# Patient Record
Sex: FEMALE | Race: WHITE | NOT HISPANIC OR LATINO | Employment: OTHER | URBAN - METROPOLITAN AREA
[De-identification: names, ages, dates, MRNs, and addresses within clinical notes are randomized per-mention and may not be internally consistent; named-entity substitution may affect disease eponyms.]

---

## 2017-04-13 ENCOUNTER — APPOINTMENT (EMERGENCY)
Dept: CT IMAGING | Facility: HOSPITAL | Age: 82
End: 2017-04-13
Payer: MEDICARE

## 2017-04-13 ENCOUNTER — APPOINTMENT (INPATIENT)
Dept: RADIOLOGY | Facility: HOSPITAL | Age: 82
DRG: 460 | End: 2017-04-13
Payer: MEDICARE

## 2017-04-13 ENCOUNTER — HOSPITAL ENCOUNTER (EMERGENCY)
Facility: HOSPITAL | Age: 82
End: 2017-04-13
Attending: EMERGENCY MEDICINE | Admitting: EMERGENCY MEDICINE
Payer: MEDICARE

## 2017-04-13 ENCOUNTER — ANESTHESIA EVENT (INPATIENT)
Dept: PERIOP | Facility: HOSPITAL | Age: 82
DRG: 460 | End: 2017-04-13
Payer: MEDICARE

## 2017-04-13 ENCOUNTER — HOSPITAL ENCOUNTER (INPATIENT)
Facility: HOSPITAL | Age: 82
LOS: 4 days | Discharge: NURSING, REHAB OR LONG-TERM CARE | DRG: 460 | End: 2017-04-17
Attending: SURGERY | Admitting: SURGERY
Payer: MEDICARE

## 2017-04-13 VITALS
HEIGHT: 62 IN | HEART RATE: 75 BPM | SYSTOLIC BLOOD PRESSURE: 116 MMHG | OXYGEN SATURATION: 94 % | RESPIRATION RATE: 15 BRPM | DIASTOLIC BLOOD PRESSURE: 55 MMHG | WEIGHT: 128 LBS | BODY MASS INDEX: 23.55 KG/M2 | TEMPERATURE: 97.7 F

## 2017-04-13 DIAGNOSIS — S22.059A T5 VERTEBRAL FRACTURE (HCC): ICD-10-CM

## 2017-04-13 DIAGNOSIS — T14.8XXA FRACTURE: Primary | ICD-10-CM

## 2017-04-13 DIAGNOSIS — S42.145A CLOSED NONDISPLACED FRACTURE OF GLENOID CAVITY OF LEFT SCAPULA, INITIAL ENCOUNTER: ICD-10-CM

## 2017-04-13 DIAGNOSIS — I50.9 CHRONIC CONGESTIVE HEART FAILURE, UNSPECIFIED CONGESTIVE HEART FAILURE TYPE: ICD-10-CM
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
## 2017-04-13 DIAGNOSIS — S22.050A: Primary | ICD-10-CM

## 2017-04-13 DIAGNOSIS — S22.080A T12 COMPRESSION FRACTURE (HCC): ICD-10-CM

## 2017-04-13 DIAGNOSIS — S42.109A SCAPULAR FRACTURE: ICD-10-CM

## 2017-04-13 DIAGNOSIS — S22.058A OTHER CLOSED FRACTURE OF FIFTH THORACIC VERTEBRA, INITIAL ENCOUNTER (HCC): ICD-10-CM

## 2017-04-13 PROBLEM — M54.9 PAIN IN BACK: Status: ACTIVE | Noted: 2017-04-13

## 2017-04-13 PROBLEM — M89.8X1 PAIN IN SCAPULA: Status: ACTIVE | Noted: 2017-04-13

## 2017-04-13 PROBLEM — R32 INCONTINENCE OF URINE IN FEMALE: Status: ACTIVE | Noted: 2017-04-13

## 2017-04-13 PROBLEM — R53.81 PHYSICAL DECONDITIONING: Status: ACTIVE | Noted: 2017-04-13

## 2017-04-13 PROBLEM — R52 PAIN: Status: ACTIVE | Noted: 2017-04-13

## 2017-04-13 LAB
ABO GROUP BLD: NORMAL
ALBUMIN SERPL BCP-MCNC: 3.9 G/DL (ref 3.5–5)
ALP SERPL-CCNC: 58 U/L (ref 46–116)
ALT SERPL W P-5'-P-CCNC: 27 U/L (ref 12–78)
ANION GAP SERPL CALCULATED.3IONS-SCNC: 11 MMOL/L (ref 4–13)
AST SERPL W P-5'-P-CCNC: 24 U/L (ref 5–45)
ATRIAL RATE: 71 BPM
BASOPHILS # BLD AUTO: 0.03 THOUSANDS/ΜL (ref 0–0.1)
BASOPHILS NFR BLD AUTO: 0 % (ref 0–1)
BILIRUB SERPL-MCNC: 0.5 MG/DL (ref 0.2–1)
BLD GP AB SCN SERPL QL: POSITIVE
BLOOD GROUP ANTIBODIES SERPL: NORMAL
BUN SERPL-MCNC: 18 MG/DL (ref 5–25)
CALCIUM SERPL-MCNC: 8.8 MG/DL (ref 8.3–10.1)
CHLORIDE SERPL-SCNC: 100 MMOL/L (ref 100–108)
CO2 SERPL-SCNC: 24 MMOL/L (ref 21–32)
CREAT SERPL-MCNC: 0.72 MG/DL (ref 0.6–1.3)
EOSINOPHIL # BLD AUTO: 0.04 THOUSAND/ΜL (ref 0–0.61)
EOSINOPHIL NFR BLD AUTO: 0 % (ref 0–6)
ERYTHROCYTE [DISTWIDTH] IN BLOOD BY AUTOMATED COUNT: 15.1 % (ref 11.6–15.1)
GFR SERPL CREATININE-BSD FRML MDRD: >60 ML/MIN/1.73SQ M
GLUCOSE SERPL-MCNC: 137 MG/DL (ref 65–140)
HCT VFR BLD AUTO: 39.2 % (ref 34.8–46.1)
HGB BLD-MCNC: 12.6 G/DL (ref 11.5–15.4)
INR PPP: 1.22 (ref 0.86–1.16)
LYMPHOCYTES # BLD AUTO: 1 THOUSANDS/ΜL (ref 0.6–4.47)
LYMPHOCYTES NFR BLD AUTO: 8 % (ref 14–44)
MCH RBC QN AUTO: 30.1 PG (ref 26.8–34.3)
MCHC RBC AUTO-ENTMCNC: 32.1 G/DL (ref 31.4–37.4)
MCV RBC AUTO: 94 FL (ref 82–98)
MONOCYTES # BLD AUTO: 1.08 THOUSAND/ΜL (ref 0.17–1.22)
MONOCYTES NFR BLD AUTO: 8 % (ref 4–12)
NEUTROPHILS # BLD AUTO: 11.18 THOUSANDS/ΜL (ref 1.85–7.62)
NEUTS SEG NFR BLD AUTO: 83 % (ref 43–75)
NRBC BLD AUTO-RTO: 0 /100 WBCS
P AXIS: 66 DEGREES
PLATELET # BLD AUTO: 157 THOUSANDS/UL (ref 149–390)
PLATELET # BLD AUTO: 174 THOUSANDS/UL (ref 149–390)
PMV BLD AUTO: 9.1 FL (ref 8.9–12.7)
PMV BLD AUTO: 9.4 FL (ref 8.9–12.7)
POTASSIUM SERPL-SCNC: 3.7 MMOL/L (ref 3.5–5.3)
PR INTERVAL: 170 MS
PROT SERPL-MCNC: 7.1 G/DL (ref 6.4–8.2)
PROTHROMBIN TIME: 15.2 SECONDS (ref 12–14.3)
QRS AXIS: 28 DEGREES
QRSD INTERVAL: 96 MS
QT INTERVAL: 404 MS
QTC INTERVAL: 439 MS
RBC # BLD AUTO: 4.18 MILLION/UL (ref 3.81–5.12)
RH BLD: NEGATIVE
SODIUM SERPL-SCNC: 135 MMOL/L (ref 136–145)
T WAVE AXIS: -68 DEGREES
TROPONIN I SERPL-MCNC: <0.02 NG/ML
VENTRICULAR RATE: 71 BPM
WBC # BLD AUTO: 13.42 THOUSAND/UL (ref 4.31–10.16)

## 2017-04-13 PROCEDURE — 86921 COMPATIBILITY TEST INCUBATE: CPT

## 2017-04-13 PROCEDURE — 70450 CT HEAD/BRAIN W/O DYE: CPT

## 2017-04-13 PROCEDURE — 86870 RBC ANTIBODY IDENTIFICATION: CPT | Performed by: SURGERY

## 2017-04-13 PROCEDURE — 85049 AUTOMATED PLATELET COUNT: CPT | Performed by: EMERGENCY MEDICINE

## 2017-04-13 PROCEDURE — 86850 RBC ANTIBODY SCREEN: CPT | Performed by: ORTHOPAEDIC SURGERY

## 2017-04-13 PROCEDURE — 96376 TX/PRO/DX INJ SAME DRUG ADON: CPT

## 2017-04-13 PROCEDURE — 90471 IMMUNIZATION ADMIN: CPT

## 2017-04-13 PROCEDURE — 94760 N-INVAS EAR/PLS OXIMETRY 1: CPT

## 2017-04-13 PROCEDURE — 71260 CT THORAX DX C+: CPT

## 2017-04-13 PROCEDURE — 84484 ASSAY OF TROPONIN QUANT: CPT | Performed by: EMERGENCY MEDICINE

## 2017-04-13 PROCEDURE — 99285 EMERGENCY DEPT VISIT HI MDM: CPT

## 2017-04-13 PROCEDURE — 36415 COLL VENOUS BLD VENIPUNCTURE: CPT | Performed by: EMERGENCY MEDICINE

## 2017-04-13 PROCEDURE — 96374 THER/PROPH/DIAG INJ IV PUSH: CPT

## 2017-04-13 PROCEDURE — 74177 CT ABD & PELVIS W/CONTRAST: CPT

## 2017-04-13 PROCEDURE — 90715 TDAP VACCINE 7 YRS/> IM: CPT | Performed by: EMERGENCY MEDICINE

## 2017-04-13 PROCEDURE — 86922 COMPATIBILITY TEST ANTIGLOB: CPT

## 2017-04-13 PROCEDURE — 96375 TX/PRO/DX INJ NEW DRUG ADDON: CPT

## 2017-04-13 PROCEDURE — 80053 COMPREHEN METABOLIC PANEL: CPT | Performed by: EMERGENCY MEDICINE

## 2017-04-13 PROCEDURE — 72146 MRI CHEST SPINE W/O DYE: CPT

## 2017-04-13 PROCEDURE — 85610 PROTHROMBIN TIME: CPT | Performed by: EMERGENCY MEDICINE

## 2017-04-13 PROCEDURE — 93005 ELECTROCARDIOGRAM TRACING: CPT | Performed by: EMERGENCY MEDICINE

## 2017-04-13 PROCEDURE — 72148 MRI LUMBAR SPINE W/O DYE: CPT

## 2017-04-13 PROCEDURE — 94640 AIRWAY INHALATION TREATMENT: CPT

## 2017-04-13 PROCEDURE — 86901 BLOOD TYPING SEROLOGIC RH(D): CPT | Performed by: ORTHOPAEDIC SURGERY

## 2017-04-13 PROCEDURE — 85025 COMPLETE CBC W/AUTO DIFF WBC: CPT | Performed by: EMERGENCY MEDICINE

## 2017-04-13 PROCEDURE — 86900 BLOOD TYPING SEROLOGIC ABO: CPT | Performed by: ORTHOPAEDIC SURGERY

## 2017-04-13 RX ORDER — CARVEDILOL 3.12 MG/1
3.12 TABLET ORAL 2 TIMES DAILY WITH MEALS
Status: DISCONTINUED | OUTPATIENT
Start: 2017-04-13 | End: 2017-04-15

## 2017-04-13 RX ORDER — OXYCODONE HYDROCHLORIDE 5 MG/1
5 TABLET ORAL EVERY 4 HOURS PRN
Status: DISCONTINUED | OUTPATIENT
Start: 2017-04-13 | End: 2017-04-17 | Stop reason: HOSPADM

## 2017-04-13 RX ORDER — MONTELUKAST SODIUM 10 MG/1
10 TABLET ORAL
COMMUNITY
End: 2018-11-23 | Stop reason: SDUPTHER

## 2017-04-13 RX ORDER — SODIUM CHLORIDE FOR INHALATION 0.9 %
3 VIAL, NEBULIZER (ML) INHALATION
Status: DISCONTINUED | OUTPATIENT
Start: 2017-04-13 | End: 2017-04-17 | Stop reason: HOSPADM

## 2017-04-13 RX ORDER — METHOCARBAMOL 500 MG/1
500 TABLET, FILM COATED ORAL EVERY 6 HOURS PRN
Status: DISCONTINUED | OUTPATIENT
Start: 2017-04-13 | End: 2017-04-17 | Stop reason: HOSPADM

## 2017-04-13 RX ORDER — DIAZEPAM 5 MG/ML
2.5 INJECTION, SOLUTION INTRAMUSCULAR; INTRAVENOUS ONCE
Status: COMPLETED | OUTPATIENT
Start: 2017-04-13 | End: 2017-04-13

## 2017-04-13 RX ORDER — AMOXICILLIN 250 MG
1 CAPSULE ORAL
Status: DISCONTINUED | OUTPATIENT
Start: 2017-04-13 | End: 2017-04-17 | Stop reason: HOSPADM

## 2017-04-13 RX ORDER — ONDANSETRON 2 MG/ML
INJECTION INTRAMUSCULAR; INTRAVENOUS
Status: COMPLETED
Start: 2017-04-13 | End: 2017-04-13

## 2017-04-13 RX ORDER — OXYCODONE HYDROCHLORIDE 5 MG/1
5 TABLET ORAL EVERY 4 HOURS PRN
Status: DISCONTINUED | OUTPATIENT
Start: 2017-04-13 | End: 2017-04-13

## 2017-04-13 RX ORDER — ACETAMINOPHEN 325 MG/1
650 TABLET ORAL EVERY 6 HOURS SCHEDULED
Status: DISCONTINUED | OUTPATIENT
Start: 2017-04-13 | End: 2017-04-17 | Stop reason: HOSPADM

## 2017-04-13 RX ORDER — FUROSEMIDE 20 MG/1
20 TABLET ORAL 2 TIMES DAILY
COMMUNITY
End: 2017-10-26

## 2017-04-13 RX ORDER — ACETAMINOPHEN 325 MG/1
650 TABLET ORAL EVERY 6 HOURS PRN
Status: DISCONTINUED | OUTPATIENT
Start: 2017-04-13 | End: 2017-04-13

## 2017-04-13 RX ORDER — ALBUTEROL SULFATE 2.5 MG/3ML
2.5 SOLUTION RESPIRATORY (INHALATION) EVERY 6 HOURS PRN
COMMUNITY

## 2017-04-13 RX ORDER — LEVALBUTEROL 1.25 MG/.5ML
1.25 SOLUTION, CONCENTRATE RESPIRATORY (INHALATION)
Status: DISCONTINUED | OUTPATIENT
Start: 2017-04-13 | End: 2017-04-17 | Stop reason: HOSPADM

## 2017-04-13 RX ORDER — MORPHINE SULFATE 4 MG/ML
4 INJECTION, SOLUTION INTRAMUSCULAR; INTRAVENOUS EVERY 4 HOURS PRN
Status: DISCONTINUED | OUTPATIENT
Start: 2017-04-13 | End: 2017-04-13 | Stop reason: HOSPADM

## 2017-04-13 RX ORDER — LISINOPRIL 2.5 MG/1
2.5 TABLET ORAL
COMMUNITY
End: 2018-06-14 | Stop reason: SDUPTHER

## 2017-04-13 RX ORDER — ATORVASTATIN CALCIUM 80 MG/1
80 TABLET, FILM COATED ORAL
Status: DISCONTINUED | OUTPATIENT
Start: 2017-04-13 | End: 2017-04-17 | Stop reason: HOSPADM

## 2017-04-13 RX ORDER — LISINOPRIL 2.5 MG/1
2.5 TABLET ORAL DAILY
Status: DISCONTINUED | OUTPATIENT
Start: 2017-04-14 | End: 2017-04-14

## 2017-04-13 RX ORDER — ATORVASTATIN CALCIUM 80 MG/1
80 TABLET, FILM COATED ORAL DAILY
COMMUNITY
End: 2018-11-23 | Stop reason: SDUPTHER

## 2017-04-13 RX ORDER — ASPIRIN 81 MG/1
81 TABLET ORAL DAILY
COMMUNITY
End: 2017-10-26

## 2017-04-13 RX ORDER — DIAZEPAM 5 MG/ML
INJECTION, SOLUTION INTRAMUSCULAR; INTRAVENOUS
Status: DISCONTINUED
Start: 2017-04-13 | End: 2017-04-13 | Stop reason: HOSPADM

## 2017-04-13 RX ORDER — OXYCODONE HYDROCHLORIDE 5 MG/1
2.5 TABLET ORAL EVERY 4 HOURS PRN
Status: DISCONTINUED | OUTPATIENT
Start: 2017-04-13 | End: 2017-04-17 | Stop reason: HOSPADM

## 2017-04-13 RX ORDER — ONDANSETRON 2 MG/ML
4 INJECTION INTRAMUSCULAR; INTRAVENOUS ONCE
Status: COMPLETED | OUTPATIENT
Start: 2017-04-13 | End: 2017-04-13

## 2017-04-13 RX ORDER — HEPARIN SODIUM 5000 [USP'U]/ML
5000 INJECTION, SOLUTION INTRAVENOUS; SUBCUTANEOUS EVERY 8 HOURS SCHEDULED
Status: DISCONTINUED | OUTPATIENT
Start: 2017-04-13 | End: 2017-04-14

## 2017-04-13 RX ORDER — LIDOCAINE HYDROCHLORIDE 10 MG/ML
1 INJECTION, SOLUTION EPIDURAL; INFILTRATION; INTRACAUDAL; PERINEURAL ONCE
Status: DISCONTINUED | OUTPATIENT
Start: 2017-04-13 | End: 2017-04-13

## 2017-04-13 RX ORDER — MORPHINE SULFATE 4 MG/ML
4 INJECTION, SOLUTION INTRAMUSCULAR; INTRAVENOUS ONCE
Status: DISCONTINUED | OUTPATIENT
Start: 2017-04-13 | End: 2017-04-13 | Stop reason: HOSPADM

## 2017-04-13 RX ORDER — OXYCODONE HYDROCHLORIDE 10 MG/1
10 TABLET ORAL EVERY 4 HOURS PRN
Status: DISCONTINUED | OUTPATIENT
Start: 2017-04-13 | End: 2017-04-13

## 2017-04-13 RX ORDER — FUROSEMIDE 20 MG/1
20 TABLET ORAL
Status: DISCONTINUED | OUTPATIENT
Start: 2017-04-13 | End: 2017-04-17 | Stop reason: HOSPADM

## 2017-04-13 RX ORDER — CARVEDILOL 3.12 MG/1
3.12 TABLET ORAL 2 TIMES DAILY WITH MEALS
COMMUNITY
End: 2018-09-10 | Stop reason: SDUPTHER

## 2017-04-13 RX ORDER — B-COMPLEX WITH VITAMIN C
1 TABLET ORAL 2 TIMES DAILY WITH MEALS
Status: DISCONTINUED | OUTPATIENT
Start: 2017-04-13 | End: 2017-04-17 | Stop reason: HOSPADM

## 2017-04-13 RX ORDER — MONTELUKAST SODIUM 10 MG/1
10 TABLET ORAL
Status: DISCONTINUED | OUTPATIENT
Start: 2017-04-13 | End: 2017-04-17 | Stop reason: HOSPADM

## 2017-04-13 RX ORDER — ALBUTEROL SULFATE 2.5 MG/3ML
2.5 SOLUTION RESPIRATORY (INHALATION) EVERY 6 HOURS PRN
Status: DISCONTINUED | OUTPATIENT
Start: 2017-04-13 | End: 2017-04-17 | Stop reason: HOSPADM

## 2017-04-13 RX ADMIN — ISODIUM CHLORIDE 3 ML: 0.03 SOLUTION RESPIRATORY (INHALATION) at 19:29

## 2017-04-13 RX ADMIN — ACETAMINOPHEN 650 MG: 325 TABLET, FILM COATED ORAL at 18:02

## 2017-04-13 RX ADMIN — Medication 1 TABLET: at 21:40

## 2017-04-13 RX ADMIN — HYDROMORPHONE HYDROCHLORIDE 0.5 MG: 1 INJECTION, SOLUTION INTRAMUSCULAR; INTRAVENOUS; SUBCUTANEOUS at 21:40

## 2017-04-13 RX ADMIN — HEPARIN SODIUM 5000 UNITS: 5000 INJECTION, SOLUTION INTRAVENOUS; SUBCUTANEOUS at 13:27

## 2017-04-13 RX ADMIN — METHOCARBAMOL 500 MG: 500 TABLET ORAL at 13:26

## 2017-04-13 RX ADMIN — FUROSEMIDE 20 MG: 20 TABLET ORAL at 18:02

## 2017-04-13 RX ADMIN — CARVEDILOL 3.12 MG: 3.12 TABLET, FILM COATED ORAL at 18:03

## 2017-04-13 RX ADMIN — METFORMIN HYDROCHLORIDE 500 MG: 500 TABLET, FILM COATED ORAL at 18:02

## 2017-04-13 RX ADMIN — TETANUS TOXOID, REDUCED DIPHTHERIA TOXOID AND ACELLULAR PERTUSSIS VACCINE, ADSORBED 0.5 ML: 5; 2.5; 8; 8; 2.5 SUSPENSION INTRAMUSCULAR at 13:01

## 2017-04-13 RX ADMIN — MORPHINE SULFATE 4 MG: 4 INJECTION, SOLUTION INTRAMUSCULAR; INTRAVENOUS at 09:33

## 2017-04-13 RX ADMIN — LEVALBUTEROL HYDROCHLORIDE 1.25 MG: 1.25 SOLUTION, CONCENTRATE RESPIRATORY (INHALATION) at 19:29

## 2017-04-13 RX ADMIN — ACETAMINOPHEN 650 MG: 325 TABLET, FILM COATED ORAL at 23:55

## 2017-04-13 RX ADMIN — ATORVASTATIN CALCIUM 80 MG: 80 TABLET, FILM COATED ORAL at 18:02

## 2017-04-13 RX ADMIN — ONDANSETRON 4 MG: 2 INJECTION INTRAMUSCULAR; INTRAVENOUS at 09:34

## 2017-04-13 RX ADMIN — DIAZEPAM 2.5 MG: 5 INJECTION, SOLUTION INTRAMUSCULAR; INTRAVENOUS at 10:18

## 2017-04-13 RX ADMIN — HEPARIN SODIUM 5000 UNITS: 5000 INJECTION, SOLUTION INTRAVENOUS; SUBCUTANEOUS at 21:40

## 2017-04-13 RX ADMIN — MORPHINE SULFATE 4 MG: 4 INJECTION, SOLUTION INTRAMUSCULAR; INTRAVENOUS at 06:41

## 2017-04-13 RX ADMIN — MONTELUKAST SODIUM 10 MG: 10 TABLET, FILM COATED ORAL at 21:40

## 2017-04-13 RX ADMIN — HYDROMORPHONE HYDROCHLORIDE 0.5 MG: 1 INJECTION, SOLUTION INTRAMUSCULAR; INTRAVENOUS; SUBCUTANEOUS at 18:02

## 2017-04-13 RX ADMIN — IOHEXOL 100 ML: 350 INJECTION, SOLUTION INTRAVENOUS at 08:16

## 2017-04-13 RX ADMIN — CALCIUM CARBONATE 500 MG (1,250 MG)-VITAMIN D3 200 UNIT TABLET 1 TABLET: at 18:02

## 2017-04-13 RX ADMIN — ACETAMINOPHEN 650 MG: 325 TABLET, FILM COATED ORAL at 13:26

## 2017-04-13 RX ADMIN — HYDROMORPHONE HYDROCHLORIDE 0.5 MG: 1 INJECTION, SOLUTION INTRAMUSCULAR; INTRAVENOUS; SUBCUTANEOUS at 13:18

## 2017-04-14 ENCOUNTER — ANESTHESIA (INPATIENT)
Dept: PERIOP | Facility: HOSPITAL | Age: 82
DRG: 460 | End: 2017-04-14
Payer: MEDICARE

## 2017-04-14 ENCOUNTER — APPOINTMENT (INPATIENT)
Dept: RADIOLOGY | Facility: HOSPITAL | Age: 82
DRG: 460 | End: 2017-04-14
Payer: MEDICARE

## 2017-04-14 LAB
ANION GAP SERPL CALCULATED.3IONS-SCNC: 5 MMOL/L (ref 4–13)
BUN SERPL-MCNC: 15 MG/DL (ref 5–25)
CALCIUM SERPL-MCNC: 8.8 MG/DL (ref 8.3–10.1)
CHLORIDE SERPL-SCNC: 98 MMOL/L (ref 100–108)
CO2 SERPL-SCNC: 30 MMOL/L (ref 21–32)
CREAT SERPL-MCNC: 0.53 MG/DL (ref 0.6–1.3)
GFR SERPL CREATININE-BSD FRML MDRD: >60 ML/MIN/1.73SQ M
GLUCOSE SERPL-MCNC: 116 MG/DL (ref 65–140)
GLUCOSE SERPL-MCNC: 123 MG/DL (ref 65–140)
GLUCOSE SERPL-MCNC: 141 MG/DL (ref 65–140)
GLUCOSE SERPL-MCNC: 148 MG/DL (ref 65–140)
GLUCOSE SERPL-MCNC: 92 MG/DL (ref 65–140)
POTASSIUM SERPL-SCNC: 4.2 MMOL/L (ref 3.5–5.3)
SODIUM SERPL-SCNC: 133 MMOL/L (ref 136–145)

## 2017-04-14 PROCEDURE — 85018 HEMOGLOBIN: CPT | Performed by: EMERGENCY MEDICINE

## 2017-04-14 PROCEDURE — 72072 X-RAY EXAM THORAC SPINE 3VWS: CPT

## 2017-04-14 PROCEDURE — C1713 ANCHOR/SCREW BN/BN,TIS/BN: HCPCS | Performed by: ORTHOPAEDIC SURGERY

## 2017-04-14 PROCEDURE — 0RG70Z1: ICD-10-PCS | Performed by: ORTHOPAEDIC SURGERY

## 2017-04-14 PROCEDURE — 95940 IONM IN OPERATNG ROOM 15 MIN: CPT | Performed by: ORTHOPAEDIC SURGERY

## 2017-04-14 PROCEDURE — 80048 BASIC METABOLIC PNL TOTAL CA: CPT | Performed by: EMERGENCY MEDICINE

## 2017-04-14 PROCEDURE — 94760 N-INVAS EAR/PLS OXIMETRY 1: CPT

## 2017-04-14 PROCEDURE — 82948 REAGENT STRIP/BLOOD GLUCOSE: CPT

## 2017-04-14 PROCEDURE — 94640 AIRWAY INHALATION TREATMENT: CPT

## 2017-04-14 DEVICE — VITALLIUM STRAIGHT ROD
Type: IMPLANTABLE DEVICE | Site: SPINE THORACIC | Status: FUNCTIONAL
Brand: XIA 4 5

## 2017-04-14 DEVICE — POLYAXIAL SCREW
Type: IMPLANTABLE DEVICE | Site: SPINE THORACIC | Status: FUNCTIONAL
Brand: XIA 4 5

## 2017-04-14 DEVICE — BLOCKER
Type: IMPLANTABLE DEVICE | Site: SPINE THORACIC | Status: FUNCTIONAL
Brand: XIA 4 5

## 2017-04-14 DEVICE — BONE GRAFT SUBSTITUTE, FOAM PACK, BETA-TRICALCIUM PHOSPHATE AND TYPE I BOVINE COLLAGEN
Type: IMPLANTABLE DEVICE | Site: SPINE THORACIC | Status: FUNCTIONAL
Brand: VITOSS

## 2017-04-14 RX ORDER — FENTANYL CITRATE 50 UG/ML
INJECTION, SOLUTION INTRAMUSCULAR; INTRAVENOUS AS NEEDED
Status: DISCONTINUED | OUTPATIENT
Start: 2017-04-14 | End: 2017-04-14 | Stop reason: SURG

## 2017-04-14 RX ORDER — MAGNESIUM HYDROXIDE 1200 MG/15ML
LIQUID ORAL AS NEEDED
Status: DISCONTINUED | OUTPATIENT
Start: 2017-04-14 | End: 2017-04-14 | Stop reason: HOSPADM

## 2017-04-14 RX ORDER — ALBUMIN, HUMAN INJ 5% 5 %
SOLUTION INTRAVENOUS CONTINUOUS PRN
Status: DISCONTINUED | OUTPATIENT
Start: 2017-04-14 | End: 2017-04-14 | Stop reason: SURG

## 2017-04-14 RX ORDER — ONDANSETRON 2 MG/ML
4 INJECTION INTRAMUSCULAR; INTRAVENOUS EVERY 8 HOURS PRN
Status: DISCONTINUED | OUTPATIENT
Start: 2017-04-14 | End: 2017-04-17 | Stop reason: HOSPADM

## 2017-04-14 RX ORDER — REMIFENTANIL HYDROCHLORIDE 1 MG/ML
0.5 INJECTION, POWDER, LYOPHILIZED, FOR SOLUTION INTRAVENOUS ONCE
Status: DISCONTINUED | OUTPATIENT
Start: 2017-04-14 | End: 2017-04-14

## 2017-04-14 RX ORDER — LISINOPRIL 2.5 MG/1
2.5 TABLET ORAL DAILY
Status: DISCONTINUED | OUTPATIENT
Start: 2017-04-15 | End: 2017-04-15

## 2017-04-14 RX ORDER — FENTANYL CITRATE/PF 50 MCG/ML
25 SYRINGE (ML) INJECTION
Status: DISCONTINUED | OUTPATIENT
Start: 2017-04-14 | End: 2017-04-14 | Stop reason: HOSPADM

## 2017-04-14 RX ORDER — ROCURONIUM BROMIDE 10 MG/ML
INJECTION, SOLUTION INTRAVENOUS AS NEEDED
Status: DISCONTINUED | OUTPATIENT
Start: 2017-04-14 | End: 2017-04-14 | Stop reason: SURG

## 2017-04-14 RX ORDER — OXYCODONE HYDROCHLORIDE 5 MG/1
TABLET ORAL
Qty: 30 TABLET | Refills: 0 | Status: SHIPPED | OUTPATIENT
Start: 2017-04-14 | End: 2017-06-20 | Stop reason: HOSPADM

## 2017-04-14 RX ORDER — SODIUM CHLORIDE, SODIUM LACTATE, POTASSIUM CHLORIDE, CALCIUM CHLORIDE 600; 310; 30; 20 MG/100ML; MG/100ML; MG/100ML; MG/100ML
INJECTION, SOLUTION INTRAVENOUS CONTINUOUS PRN
Status: DISCONTINUED | OUTPATIENT
Start: 2017-04-14 | End: 2017-04-14 | Stop reason: SURG

## 2017-04-14 RX ORDER — SODIUM CHLORIDE, SODIUM LACTATE, POTASSIUM CHLORIDE, CALCIUM CHLORIDE 600; 310; 30; 20 MG/100ML; MG/100ML; MG/100ML; MG/100ML
75 INJECTION, SOLUTION INTRAVENOUS CONTINUOUS
Status: DISCONTINUED | OUTPATIENT
Start: 2017-04-14 | End: 2017-04-14

## 2017-04-14 RX ORDER — PROPOFOL 10 MG/ML
INJECTION, EMULSION INTRAVENOUS CONTINUOUS PRN
Status: DISCONTINUED | OUTPATIENT
Start: 2017-04-14 | End: 2017-04-14 | Stop reason: SURG

## 2017-04-14 RX ORDER — SUCCINYLCHOLINE/SOD CL,ISO/PF 100 MG/5ML
SYRINGE (ML) INTRAVENOUS AS NEEDED
Status: DISCONTINUED | OUTPATIENT
Start: 2017-04-14 | End: 2017-04-14 | Stop reason: SURG

## 2017-04-14 RX ORDER — PROPOFOL 10 MG/ML
INJECTION, EMULSION INTRAVENOUS AS NEEDED
Status: DISCONTINUED | OUTPATIENT
Start: 2017-04-14 | End: 2017-04-14 | Stop reason: SURG

## 2017-04-14 RX ORDER — ACETAMINOPHEN 325 MG/1
650 TABLET ORAL EVERY 6 HOURS PRN
Qty: 60 TABLET | Refills: 0 | Status: SHIPPED | OUTPATIENT
Start: 2017-04-14 | End: 2017-05-14

## 2017-04-14 RX ORDER — METOCLOPRAMIDE HYDROCHLORIDE 5 MG/ML
10 INJECTION INTRAMUSCULAR; INTRAVENOUS ONCE AS NEEDED
Status: DISCONTINUED | OUTPATIENT
Start: 2017-04-14 | End: 2017-04-14 | Stop reason: HOSPADM

## 2017-04-14 RX ORDER — CHLORHEXIDINE GLUCONATE 0.12 MG/ML
15 RINSE ORAL EVERY 12 HOURS SCHEDULED
Status: DISCONTINUED | OUTPATIENT
Start: 2017-04-14 | End: 2017-04-14 | Stop reason: HOSPADM

## 2017-04-14 RX ORDER — METHOCARBAMOL 750 MG/1
750 TABLET, FILM COATED ORAL 3 TIMES DAILY PRN
Qty: 60 TABLET | Refills: 0 | Status: SHIPPED | OUTPATIENT
Start: 2017-04-14 | End: 2017-06-15

## 2017-04-14 RX ORDER — ONDANSETRON 2 MG/ML
INJECTION INTRAMUSCULAR; INTRAVENOUS AS NEEDED
Status: DISCONTINUED | OUTPATIENT
Start: 2017-04-14 | End: 2017-04-14 | Stop reason: SURG

## 2017-04-14 RX ORDER — SODIUM CHLORIDE, SODIUM LACTATE, POTASSIUM CHLORIDE, CALCIUM CHLORIDE 600; 310; 30; 20 MG/100ML; MG/100ML; MG/100ML; MG/100ML
75 INJECTION, SOLUTION INTRAVENOUS CONTINUOUS
Status: DISCONTINUED | OUTPATIENT
Start: 2017-04-14 | End: 2017-04-16

## 2017-04-14 RX ORDER — SODIUM CHLORIDE 9 MG/ML
INJECTION, SOLUTION INTRAVENOUS CONTINUOUS PRN
Status: DISCONTINUED | OUTPATIENT
Start: 2017-04-14 | End: 2017-04-14 | Stop reason: SURG

## 2017-04-14 RX ORDER — CEPHALEXIN 250 MG/1
250 CAPSULE ORAL 4 TIMES DAILY
Qty: 20 CAPSULE | Refills: 0 | Status: SHIPPED | OUTPATIENT
Start: 2017-04-14 | End: 2017-04-19

## 2017-04-14 RX ORDER — BUPIVACAINE HYDROCHLORIDE 2.5 MG/ML
INJECTION, SOLUTION INFILTRATION; PERINEURAL AS NEEDED
Status: DISCONTINUED | OUTPATIENT
Start: 2017-04-14 | End: 2017-04-14 | Stop reason: HOSPADM

## 2017-04-14 RX ORDER — EPHEDRINE SULFATE 50 MG/ML
INJECTION, SOLUTION INTRAVENOUS AS NEEDED
Status: DISCONTINUED | OUTPATIENT
Start: 2017-04-14 | End: 2017-04-14 | Stop reason: SURG

## 2017-04-14 RX ADMIN — EPHEDRINE SULFATE 10 MG: 50 INJECTION, SOLUTION INTRAMUSCULAR; INTRAVENOUS; SUBCUTANEOUS at 17:45

## 2017-04-14 RX ADMIN — PROPOFOL 50 MCG/KG/MIN: 10 INJECTION, EMULSION INTRAVENOUS at 14:36

## 2017-04-14 RX ADMIN — HYDROMORPHONE HYDROCHLORIDE 0.5 MG: 1 INJECTION, SOLUTION INTRAMUSCULAR; INTRAVENOUS; SUBCUTANEOUS at 09:50

## 2017-04-14 RX ADMIN — CEFAZOLIN SODIUM 2000 MG: 10 INJECTION, POWDER, FOR SOLUTION INTRAVENOUS at 22:34

## 2017-04-14 RX ADMIN — OXYCODONE HYDROCHLORIDE 5 MG: 5 TABLET ORAL at 21:32

## 2017-04-14 RX ADMIN — ONDANSETRON 4 MG: 2 INJECTION INTRAMUSCULAR; INTRAVENOUS at 17:25

## 2017-04-14 RX ADMIN — Medication 80 MG: at 14:32

## 2017-04-14 RX ADMIN — EPHEDRINE SULFATE 10 MG: 50 INJECTION, SOLUTION INTRAMUSCULAR; INTRAVENOUS; SUBCUTANEOUS at 14:41

## 2017-04-14 RX ADMIN — OXYCODONE HYDROCHLORIDE 5 MG: 5 TABLET ORAL at 12:23

## 2017-04-14 RX ADMIN — FENTANYL CITRATE 25 MCG: 50 INJECTION INTRAMUSCULAR; INTRAVENOUS at 19:30

## 2017-04-14 RX ADMIN — HYDROMORPHONE HYDROCHLORIDE 0.5 MG: 1 INJECTION, SOLUTION INTRAMUSCULAR; INTRAVENOUS; SUBCUTANEOUS at 17:25

## 2017-04-14 RX ADMIN — ROCURONIUM BROMIDE 5 MG: 10 INJECTION, SOLUTION INTRAVENOUS at 14:32

## 2017-04-14 RX ADMIN — METHOCARBAMOL 500 MG: 500 TABLET ORAL at 05:23

## 2017-04-14 RX ADMIN — FENTANYL CITRATE 50 MCG: 50 INJECTION, SOLUTION INTRAMUSCULAR; INTRAVENOUS at 18:15

## 2017-04-14 RX ADMIN — SODIUM CHLORIDE: 0.9 INJECTION, SOLUTION INTRAVENOUS at 14:36

## 2017-04-14 RX ADMIN — Medication 1 TABLET: at 21:32

## 2017-04-14 RX ADMIN — CARVEDILOL 3.12 MG: 3.12 TABLET, FILM COATED ORAL at 08:13

## 2017-04-14 RX ADMIN — METFORMIN HYDROCHLORIDE 500 MG: 500 TABLET, FILM COATED ORAL at 20:49

## 2017-04-14 RX ADMIN — METHOCARBAMOL 500 MG: 500 TABLET ORAL at 21:32

## 2017-04-14 RX ADMIN — ALBUMIN HUMAN: 0.05 INJECTION, SOLUTION INTRAVENOUS at 16:22

## 2017-04-14 RX ADMIN — ISODIUM CHLORIDE 3 ML: 0.03 SOLUTION RESPIRATORY (INHALATION) at 07:39

## 2017-04-14 RX ADMIN — ROCURONIUM BROMIDE 15 MG: 10 INJECTION, SOLUTION INTRAVENOUS at 14:36

## 2017-04-14 RX ADMIN — REMIFENTANIL HYDROCHLORIDE 0.1 MCG/KG/MIN: 1 INJECTION, POWDER, LYOPHILIZED, FOR SOLUTION INTRAVENOUS at 14:36

## 2017-04-14 RX ADMIN — FUROSEMIDE 20 MG: 20 TABLET ORAL at 08:13

## 2017-04-14 RX ADMIN — ISODIUM CHLORIDE 3 ML: 0.03 SOLUTION RESPIRATORY (INHALATION) at 19:51

## 2017-04-14 RX ADMIN — PHENYLEPHRINE HYDROCHLORIDE 50 MCG/MIN: 10 INJECTION INTRAVENOUS at 14:38

## 2017-04-14 RX ADMIN — LEVALBUTEROL HYDROCHLORIDE 1.25 MG: 1.25 SOLUTION, CONCENTRATE RESPIRATORY (INHALATION) at 19:51

## 2017-04-14 RX ADMIN — ONDANSETRON 4 MG: 2 INJECTION INTRAMUSCULAR; INTRAVENOUS at 09:50

## 2017-04-14 RX ADMIN — MONTELUKAST SODIUM 10 MG: 10 TABLET, FILM COATED ORAL at 21:32

## 2017-04-14 RX ADMIN — ALBUMIN HUMAN: 0.05 INJECTION, SOLUTION INTRAVENOUS at 14:35

## 2017-04-14 RX ADMIN — SODIUM CHLORIDE, SODIUM LACTATE, POTASSIUM CHLORIDE, AND CALCIUM CHLORIDE: .6; .31; .03; .02 INJECTION, SOLUTION INTRAVENOUS at 14:40

## 2017-04-14 RX ADMIN — ACETAMINOPHEN 650 MG: 325 TABLET, FILM COATED ORAL at 12:23

## 2017-04-14 RX ADMIN — LEVALBUTEROL HYDROCHLORIDE 1.25 MG: 1.25 SOLUTION, CONCENTRATE RESPIRATORY (INHALATION) at 07:39

## 2017-04-14 RX ADMIN — Medication 2000 MG: at 15:10

## 2017-04-14 RX ADMIN — ACETAMINOPHEN 650 MG: 325 TABLET, FILM COATED ORAL at 05:20

## 2017-04-14 RX ADMIN — ATORVASTATIN CALCIUM 80 MG: 80 TABLET, FILM COATED ORAL at 20:49

## 2017-04-14 RX ADMIN — ROCURONIUM BROMIDE 10 MG: 10 INJECTION, SOLUTION INTRAVENOUS at 15:38

## 2017-04-14 RX ADMIN — TIOTROPIUM BROMIDE 18 MCG: 18 CAPSULE ORAL; RESPIRATORY (INHALATION) at 08:13

## 2017-04-14 RX ADMIN — ACETAMINOPHEN 650 MG: 325 TABLET, FILM COATED ORAL at 23:44

## 2017-04-14 RX ADMIN — CARVEDILOL 3.12 MG: 3.12 TABLET, FILM COATED ORAL at 20:49

## 2017-04-14 RX ADMIN — SODIUM CHLORIDE, SODIUM LACTATE, POTASSIUM CHLORIDE, AND CALCIUM CHLORIDE 75 ML/HR: .6; .31; .03; .02 INJECTION, SOLUTION INTRAVENOUS at 20:14

## 2017-04-14 RX ADMIN — OXYCODONE HYDROCHLORIDE 5 MG: 5 TABLET ORAL at 05:23

## 2017-04-14 RX ADMIN — CHLORHEXIDINE GLUCONATE 15 ML: 1.2 RINSE ORAL at 13:29

## 2017-04-14 RX ADMIN — HYDROMORPHONE HYDROCHLORIDE 0.5 MG: 1 INJECTION, SOLUTION INTRAMUSCULAR; INTRAVENOUS; SUBCUTANEOUS at 17:40

## 2017-04-14 RX ADMIN — HYDROMORPHONE HYDROCHLORIDE 0.5 MG: 1 INJECTION, SOLUTION INTRAMUSCULAR; INTRAVENOUS; SUBCUTANEOUS at 20:11

## 2017-04-14 RX ADMIN — CALCIUM CARBONATE 500 MG (1,250 MG)-VITAMIN D3 200 UNIT TABLET 1 TABLET: at 08:13

## 2017-04-14 RX ADMIN — CALCIUM CARBONATE 500 MG (1,250 MG)-VITAMIN D3 200 UNIT TABLET 1 TABLET: at 20:49

## 2017-04-14 RX ADMIN — FENTANYL CITRATE 50 MCG: 50 INJECTION, SOLUTION INTRAMUSCULAR; INTRAVENOUS at 14:32

## 2017-04-14 RX ADMIN — FUROSEMIDE 20 MG: 20 TABLET ORAL at 20:49

## 2017-04-14 RX ADMIN — SODIUM CHLORIDE, SODIUM LACTATE, POTASSIUM CHLORIDE, AND CALCIUM CHLORIDE 75 ML/HR: .6; .31; .03; .02 INJECTION, SOLUTION INTRAVENOUS at 06:30

## 2017-04-14 RX ADMIN — LISINOPRIL 2.5 MG: 2.5 TABLET ORAL at 08:13

## 2017-04-14 RX ADMIN — METFORMIN HYDROCHLORIDE 500 MG: 500 TABLET, FILM COATED ORAL at 08:13

## 2017-04-14 RX ADMIN — PROPOFOL 100 MG: 10 INJECTION, EMULSION INTRAVENOUS at 14:32

## 2017-04-15 LAB
HGB BLD-MCNC: 8.9 G/DL (ref 11.5–15.4)
HGB BLD-MCNC: 9.8 G/DL (ref 11.5–15.4)

## 2017-04-15 PROCEDURE — G8978 MOBILITY CURRENT STATUS: HCPCS

## 2017-04-15 PROCEDURE — G8987 SELF CARE CURRENT STATUS: HCPCS

## 2017-04-15 PROCEDURE — G8979 MOBILITY GOAL STATUS: HCPCS

## 2017-04-15 PROCEDURE — 94760 N-INVAS EAR/PLS OXIMETRY 1: CPT

## 2017-04-15 PROCEDURE — 85018 HEMOGLOBIN: CPT | Performed by: EMERGENCY MEDICINE

## 2017-04-15 PROCEDURE — 94640 AIRWAY INHALATION TREATMENT: CPT

## 2017-04-15 PROCEDURE — 97166 OT EVAL MOD COMPLEX 45 MIN: CPT

## 2017-04-15 PROCEDURE — G8988 SELF CARE GOAL STATUS: HCPCS

## 2017-04-15 PROCEDURE — 97760 ORTHOTIC MGMT&TRAING 1ST ENC: CPT

## 2017-04-15 PROCEDURE — 97163 PT EVAL HIGH COMPLEX 45 MIN: CPT

## 2017-04-15 RX ORDER — CARVEDILOL 6.25 MG/1
6.25 TABLET ORAL 2 TIMES DAILY WITH MEALS
Status: DISCONTINUED | OUTPATIENT
Start: 2017-04-15 | End: 2017-04-17 | Stop reason: HOSPADM

## 2017-04-15 RX ORDER — LISINOPRIL 5 MG/1
5 TABLET ORAL DAILY
Status: DISCONTINUED | OUTPATIENT
Start: 2017-04-16 | End: 2017-04-17 | Stop reason: HOSPADM

## 2017-04-15 RX ADMIN — Medication 1 TABLET: at 21:53

## 2017-04-15 RX ADMIN — ACETAMINOPHEN 650 MG: 325 TABLET, FILM COATED ORAL at 05:49

## 2017-04-15 RX ADMIN — METFORMIN HYDROCHLORIDE 500 MG: 500 TABLET, FILM COATED ORAL at 08:45

## 2017-04-15 RX ADMIN — FUROSEMIDE 20 MG: 20 TABLET ORAL at 17:33

## 2017-04-15 RX ADMIN — TIOTROPIUM BROMIDE 18 MCG: 18 CAPSULE ORAL; RESPIRATORY (INHALATION) at 08:50

## 2017-04-15 RX ADMIN — OXYCODONE HYDROCHLORIDE 5 MG: 5 TABLET ORAL at 08:45

## 2017-04-15 RX ADMIN — CARVEDILOL 3.12 MG: 3.12 TABLET, FILM COATED ORAL at 08:45

## 2017-04-15 RX ADMIN — ACETAMINOPHEN 650 MG: 325 TABLET, FILM COATED ORAL at 17:32

## 2017-04-15 RX ADMIN — SODIUM CHLORIDE, SODIUM LACTATE, POTASSIUM CHLORIDE, AND CALCIUM CHLORIDE 75 ML/HR: .6; .31; .03; .02 INJECTION, SOLUTION INTRAVENOUS at 04:16

## 2017-04-15 RX ADMIN — ACETAMINOPHEN 650 MG: 325 TABLET, FILM COATED ORAL at 11:53

## 2017-04-15 RX ADMIN — OXYCODONE HYDROCHLORIDE 5 MG: 5 TABLET ORAL at 19:47

## 2017-04-15 RX ADMIN — OXYCODONE HYDROCHLORIDE 5 MG: 5 TABLET ORAL at 23:51

## 2017-04-15 RX ADMIN — OXYCODONE HYDROCHLORIDE 5 MG: 5 TABLET ORAL at 15:30

## 2017-04-15 RX ADMIN — ISODIUM CHLORIDE 3 ML: 0.03 SOLUTION RESPIRATORY (INHALATION) at 07:52

## 2017-04-15 RX ADMIN — CALCIUM CARBONATE 500 MG (1,250 MG)-VITAMIN D3 200 UNIT TABLET 1 TABLET: at 08:45

## 2017-04-15 RX ADMIN — ISODIUM CHLORIDE 3 ML: 0.03 SOLUTION RESPIRATORY (INHALATION) at 19:02

## 2017-04-15 RX ADMIN — LISINOPRIL 2.5 MG: 2.5 TABLET ORAL at 08:44

## 2017-04-15 RX ADMIN — OXYCODONE HYDROCHLORIDE 5 MG: 5 TABLET ORAL at 02:46

## 2017-04-15 RX ADMIN — MONTELUKAST SODIUM 10 MG: 10 TABLET, FILM COATED ORAL at 21:53

## 2017-04-15 RX ADMIN — METHOCARBAMOL 500 MG: 500 TABLET ORAL at 23:51

## 2017-04-15 RX ADMIN — CEFAZOLIN SODIUM 2000 MG: 10 INJECTION, POWDER, FOR SOLUTION INTRAVENOUS at 08:44

## 2017-04-15 RX ADMIN — METFORMIN HYDROCHLORIDE 500 MG: 500 TABLET, FILM COATED ORAL at 17:33

## 2017-04-15 RX ADMIN — LEVALBUTEROL HYDROCHLORIDE 1.25 MG: 1.25 SOLUTION, CONCENTRATE RESPIRATORY (INHALATION) at 19:02

## 2017-04-15 RX ADMIN — HYDROMORPHONE HYDROCHLORIDE 0.5 MG: 1 INJECTION, SOLUTION INTRAMUSCULAR; INTRAVENOUS; SUBCUTANEOUS at 11:53

## 2017-04-15 RX ADMIN — ACETAMINOPHEN 650 MG: 325 TABLET, FILM COATED ORAL at 23:51

## 2017-04-15 RX ADMIN — METHOCARBAMOL 500 MG: 500 TABLET ORAL at 17:33

## 2017-04-15 RX ADMIN — CALCIUM CARBONATE 500 MG (1,250 MG)-VITAMIN D3 200 UNIT TABLET 1 TABLET: at 17:33

## 2017-04-15 RX ADMIN — LEVALBUTEROL HYDROCHLORIDE 1.25 MG: 1.25 SOLUTION, CONCENTRATE RESPIRATORY (INHALATION) at 07:52

## 2017-04-15 RX ADMIN — FUROSEMIDE 20 MG: 20 TABLET ORAL at 08:45

## 2017-04-15 RX ADMIN — CARVEDILOL 6.25 MG: 6.25 TABLET, FILM COATED ORAL at 17:33

## 2017-04-15 RX ADMIN — ATORVASTATIN CALCIUM 80 MG: 80 TABLET, FILM COATED ORAL at 17:32

## 2017-04-16 LAB
ANION GAP SERPL CALCULATED.3IONS-SCNC: 4 MMOL/L (ref 4–13)
BUN SERPL-MCNC: 9 MG/DL (ref 5–25)
CALCIUM SERPL-MCNC: 8.3 MG/DL (ref 8.3–10.1)
CHLORIDE SERPL-SCNC: 94 MMOL/L (ref 100–108)
CO2 SERPL-SCNC: 32 MMOL/L (ref 21–32)
CREAT SERPL-MCNC: 0.3 MG/DL (ref 0.6–1.3)
ERYTHROCYTE [DISTWIDTH] IN BLOOD BY AUTOMATED COUNT: 14.7 % (ref 11.6–15.1)
GFR SERPL CREATININE-BSD FRML MDRD: >60 ML/MIN/1.73SQ M
GLUCOSE SERPL-MCNC: 101 MG/DL (ref 65–140)
GLUCOSE SERPL-MCNC: 105 MG/DL (ref 65–140)
HCT VFR BLD AUTO: 25.4 % (ref 34.8–46.1)
HGB BLD-MCNC: 8.7 G/DL (ref 11.5–15.4)
MCH RBC QN AUTO: 30.4 PG (ref 26.8–34.3)
MCHC RBC AUTO-ENTMCNC: 34.3 G/DL (ref 31.4–37.4)
MCV RBC AUTO: 89 FL (ref 82–98)
PLATELET # BLD AUTO: 115 THOUSANDS/UL (ref 149–390)
PMV BLD AUTO: 9.8 FL (ref 8.9–12.7)
POTASSIUM SERPL-SCNC: 3.6 MMOL/L (ref 3.5–5.3)
RBC # BLD AUTO: 2.86 MILLION/UL (ref 3.81–5.12)
SODIUM SERPL-SCNC: 130 MMOL/L (ref 136–145)
WBC # BLD AUTO: 6.46 THOUSAND/UL (ref 4.31–10.16)

## 2017-04-16 PROCEDURE — 94760 N-INVAS EAR/PLS OXIMETRY 1: CPT

## 2017-04-16 PROCEDURE — 85027 COMPLETE CBC AUTOMATED: CPT | Performed by: ORTHOPAEDIC SURGERY

## 2017-04-16 PROCEDURE — 80048 BASIC METABOLIC PNL TOTAL CA: CPT | Performed by: ORTHOPAEDIC SURGERY

## 2017-04-16 PROCEDURE — 94640 AIRWAY INHALATION TREATMENT: CPT

## 2017-04-16 RX ORDER — SODIUM CHLORIDE, SODIUM LACTATE, POTASSIUM CHLORIDE, CALCIUM CHLORIDE 600; 310; 30; 20 MG/100ML; MG/100ML; MG/100ML; MG/100ML
75 INJECTION, SOLUTION INTRAVENOUS CONTINUOUS
Status: DISCONTINUED | OUTPATIENT
Start: 2017-04-16 | End: 2017-04-17 | Stop reason: HOSPADM

## 2017-04-16 RX ADMIN — MONTELUKAST SODIUM 10 MG: 10 TABLET, FILM COATED ORAL at 21:09

## 2017-04-16 RX ADMIN — ACETAMINOPHEN 650 MG: 325 TABLET, FILM COATED ORAL at 23:45

## 2017-04-16 RX ADMIN — CALCIUM CARBONATE 500 MG (1,250 MG)-VITAMIN D3 200 UNIT TABLET 1 TABLET: at 16:29

## 2017-04-16 RX ADMIN — SODIUM CHLORIDE, SODIUM LACTATE, POTASSIUM CHLORIDE, AND CALCIUM CHLORIDE 100 ML/HR: .6; .31; .03; .02 INJECTION, SOLUTION INTRAVENOUS at 12:08

## 2017-04-16 RX ADMIN — ACETAMINOPHEN 650 MG: 325 TABLET, FILM COATED ORAL at 16:56

## 2017-04-16 RX ADMIN — ACETAMINOPHEN 650 MG: 325 TABLET, FILM COATED ORAL at 12:05

## 2017-04-16 RX ADMIN — METHOCARBAMOL 500 MG: 500 TABLET ORAL at 18:20

## 2017-04-16 RX ADMIN — LEVALBUTEROL HYDROCHLORIDE 1.25 MG: 1.25 SOLUTION, CONCENTRATE RESPIRATORY (INHALATION) at 19:18

## 2017-04-16 RX ADMIN — Medication 1 TABLET: at 21:09

## 2017-04-16 RX ADMIN — ISODIUM CHLORIDE 3 ML: 0.03 SOLUTION RESPIRATORY (INHALATION) at 07:39

## 2017-04-16 RX ADMIN — METHOCARBAMOL 500 MG: 500 TABLET ORAL at 06:05

## 2017-04-16 RX ADMIN — CARVEDILOL 6.25 MG: 6.25 TABLET, FILM COATED ORAL at 09:11

## 2017-04-16 RX ADMIN — SODIUM CHLORIDE, SODIUM LACTATE, POTASSIUM CHLORIDE, AND CALCIUM CHLORIDE 75 ML/HR: .6; .31; .03; .02 INJECTION, SOLUTION INTRAVENOUS at 04:10

## 2017-04-16 RX ADMIN — ISODIUM CHLORIDE 3 ML: 0.03 SOLUTION RESPIRATORY (INHALATION) at 19:18

## 2017-04-16 RX ADMIN — METFORMIN HYDROCHLORIDE 500 MG: 500 TABLET, FILM COATED ORAL at 16:29

## 2017-04-16 RX ADMIN — METHOCARBAMOL 500 MG: 500 TABLET ORAL at 13:49

## 2017-04-16 RX ADMIN — OXYCODONE HYDROCHLORIDE 5 MG: 5 TABLET ORAL at 09:12

## 2017-04-16 RX ADMIN — LEVALBUTEROL HYDROCHLORIDE 1.25 MG: 1.25 SOLUTION, CONCENTRATE RESPIRATORY (INHALATION) at 07:39

## 2017-04-16 RX ADMIN — OXYCODONE HYDROCHLORIDE 5 MG: 5 TABLET ORAL at 13:49

## 2017-04-16 RX ADMIN — OXYCODONE HYDROCHLORIDE 5 MG: 5 TABLET ORAL at 23:45

## 2017-04-16 RX ADMIN — CARVEDILOL 6.25 MG: 6.25 TABLET, FILM COATED ORAL at 16:29

## 2017-04-16 RX ADMIN — METFORMIN HYDROCHLORIDE 500 MG: 500 TABLET, FILM COATED ORAL at 09:11

## 2017-04-16 RX ADMIN — OXYCODONE HYDROCHLORIDE 5 MG: 5 TABLET ORAL at 18:20

## 2017-04-16 RX ADMIN — FUROSEMIDE 20 MG: 20 TABLET ORAL at 09:11

## 2017-04-16 RX ADMIN — FUROSEMIDE 20 MG: 20 TABLET ORAL at 16:29

## 2017-04-16 RX ADMIN — OXYCODONE HYDROCHLORIDE 5 MG: 5 TABLET ORAL at 04:08

## 2017-04-16 RX ADMIN — ATORVASTATIN CALCIUM 80 MG: 80 TABLET, FILM COATED ORAL at 16:55

## 2017-04-16 RX ADMIN — CALCIUM CARBONATE 500 MG (1,250 MG)-VITAMIN D3 200 UNIT TABLET 1 TABLET: at 09:11

## 2017-04-16 RX ADMIN — LISINOPRIL 5 MG: 5 TABLET ORAL at 09:11

## 2017-04-16 RX ADMIN — SODIUM CHLORIDE, SODIUM LACTATE, POTASSIUM CHLORIDE, AND CALCIUM CHLORIDE 100 ML/HR: .6; .31; .03; .02 INJECTION, SOLUTION INTRAVENOUS at 16:36

## 2017-04-16 RX ADMIN — ACETAMINOPHEN 650 MG: 325 TABLET, FILM COATED ORAL at 06:05

## 2017-04-17 VITALS
DIASTOLIC BLOOD PRESSURE: 56 MMHG | BODY MASS INDEX: 22.63 KG/M2 | HEIGHT: 62 IN | OXYGEN SATURATION: 96 % | SYSTOLIC BLOOD PRESSURE: 103 MMHG | HEART RATE: 76 BPM | WEIGHT: 122.99 LBS | TEMPERATURE: 98.6 F | RESPIRATION RATE: 18 BRPM

## 2017-04-17 LAB
ABO GROUP BLD BPU: NORMAL
ABO GROUP BLD BPU: NORMAL
ANION GAP SERPL CALCULATED.3IONS-SCNC: 3 MMOL/L (ref 4–13)
BPU ID: NORMAL
BPU ID: NORMAL
BUN SERPL-MCNC: 8 MG/DL (ref 5–25)
CALCIUM SERPL-MCNC: 7.7 MG/DL (ref 8.3–10.1)
CHLORIDE SERPL-SCNC: 95 MMOL/L (ref 100–108)
CO2 SERPL-SCNC: 34 MMOL/L (ref 21–32)
CREAT SERPL-MCNC: 0.35 MG/DL (ref 0.6–1.3)
CROSSMATCH: NORMAL
CROSSMATCH: NORMAL
GFR SERPL CREATININE-BSD FRML MDRD: >60 ML/MIN/1.73SQ M
GLUCOSE SERPL-MCNC: 115 MG/DL (ref 65–140)
POTASSIUM SERPL-SCNC: 3.8 MMOL/L (ref 3.5–5.3)
SODIUM SERPL-SCNC: 132 MMOL/L (ref 136–145)
UNIT DISPENSE STATUS: NORMAL
UNIT DISPENSE STATUS: NORMAL
UNIT PRODUCT CODE: NORMAL
UNIT PRODUCT CODE: NORMAL
UNIT RH: NORMAL
UNIT RH: NORMAL

## 2017-04-17 PROCEDURE — 80048 BASIC METABOLIC PNL TOTAL CA: CPT | Performed by: ORTHOPAEDIC SURGERY

## 2017-04-17 PROCEDURE — 94640 AIRWAY INHALATION TREATMENT: CPT

## 2017-04-17 PROCEDURE — 94760 N-INVAS EAR/PLS OXIMETRY 1: CPT

## 2017-04-17 RX ORDER — POLYETHYLENE GLYCOL 3350 17 G/17G
17 POWDER, FOR SOLUTION ORAL DAILY
Status: DISCONTINUED | OUTPATIENT
Start: 2017-04-17 | End: 2017-04-17 | Stop reason: HOSPADM

## 2017-04-17 RX ADMIN — SODIUM CHLORIDE, SODIUM LACTATE, POTASSIUM CHLORIDE, AND CALCIUM CHLORIDE 75 ML/HR: .6; .31; .03; .02 INJECTION, SOLUTION INTRAVENOUS at 12:13

## 2017-04-17 RX ADMIN — METFORMIN HYDROCHLORIDE 500 MG: 500 TABLET, FILM COATED ORAL at 09:23

## 2017-04-17 RX ADMIN — ACETAMINOPHEN 650 MG: 325 TABLET, FILM COATED ORAL at 06:24

## 2017-04-17 RX ADMIN — HYDROMORPHONE HYDROCHLORIDE 0.5 MG: 1 INJECTION, SOLUTION INTRAMUSCULAR; INTRAVENOUS; SUBCUTANEOUS at 03:45

## 2017-04-17 RX ADMIN — CARVEDILOL 6.25 MG: 6.25 TABLET, FILM COATED ORAL at 09:22

## 2017-04-17 RX ADMIN — CALCIUM CARBONATE 500 MG (1,250 MG)-VITAMIN D3 200 UNIT TABLET 1 TABLET: at 09:22

## 2017-04-17 RX ADMIN — OXYCODONE HYDROCHLORIDE 5 MG: 5 TABLET ORAL at 10:52

## 2017-04-17 RX ADMIN — OXYCODONE HYDROCHLORIDE 5 MG: 5 TABLET ORAL at 14:03

## 2017-04-17 RX ADMIN — ACETAMINOPHEN 650 MG: 325 TABLET, FILM COATED ORAL at 10:49

## 2017-04-17 RX ADMIN — METHOCARBAMOL 500 MG: 500 TABLET ORAL at 09:21

## 2017-04-17 RX ADMIN — OXYCODONE HYDROCHLORIDE 5 MG: 5 TABLET ORAL at 06:27

## 2017-04-17 RX ADMIN — ISODIUM CHLORIDE 3 ML: 0.03 SOLUTION RESPIRATORY (INHALATION) at 07:09

## 2017-04-17 RX ADMIN — LEVALBUTEROL HYDROCHLORIDE 1.25 MG: 1.25 SOLUTION, CONCENTRATE RESPIRATORY (INHALATION) at 07:09

## 2017-04-17 RX ADMIN — FUROSEMIDE 20 MG: 20 TABLET ORAL at 09:21

## 2017-04-17 RX ADMIN — LISINOPRIL 5 MG: 5 TABLET ORAL at 09:25

## 2017-04-25 ENCOUNTER — ALLSCRIPTS OFFICE VISIT (OUTPATIENT)
Dept: OTHER | Facility: OTHER | Age: 82
End: 2017-04-25

## 2017-04-27 ENCOUNTER — ALLSCRIPTS OFFICE VISIT (OUTPATIENT)
Dept: OTHER | Facility: OTHER | Age: 82
End: 2017-04-27

## 2017-04-27 ENCOUNTER — HOSPITAL ENCOUNTER (OUTPATIENT)
Dept: RADIOLOGY | Facility: HOSPITAL | Age: 82
Discharge: HOME | End: 2017-04-27
Attending: ORTHOPAEDIC SURGERY
Payer: COMMERCIAL

## 2017-04-27 DIAGNOSIS — S22.058D: ICD-10-CM

## 2017-04-27 PROCEDURE — 72070 X-RAY EXAM THORAC SPINE 2VWS: CPT

## 2017-05-08 ENCOUNTER — GENERIC CONVERSION - ENCOUNTER (OUTPATIENT)
Dept: OTHER | Facility: OTHER | Age: 82
End: 2017-05-08

## 2017-05-25 ENCOUNTER — HOSPITAL ENCOUNTER (OUTPATIENT)
Dept: RADIOLOGY | Facility: HOSPITAL | Age: 82
Discharge: HOME | End: 2017-05-25
Attending: ORTHOPAEDIC SURGERY
Payer: MEDICARE

## 2017-05-25 ENCOUNTER — ALLSCRIPTS OFFICE VISIT (OUTPATIENT)
Dept: OTHER | Facility: OTHER | Age: 82
End: 2017-05-25

## 2017-05-25 DIAGNOSIS — S22.058D: ICD-10-CM

## 2017-05-25 PROCEDURE — 72070 X-RAY EXAM THORAC SPINE 2VWS: CPT

## 2017-06-05 ENCOUNTER — ALLSCRIPTS OFFICE VISIT (OUTPATIENT)
Dept: OTHER | Facility: OTHER | Age: 82
End: 2017-06-05

## 2017-06-05 ENCOUNTER — HOSPITAL ENCOUNTER (OUTPATIENT)
Dept: RADIOLOGY | Facility: HOSPITAL | Age: 82
Discharge: HOME | End: 2017-06-05
Attending: ORTHOPAEDIC SURGERY
Payer: MEDICARE

## 2017-06-05 DIAGNOSIS — S22.058D: ICD-10-CM

## 2017-06-05 DIAGNOSIS — S22.080D WEDGE COMPRESSION FRACTURE OF T11-T12 VERTEBRA, SUBSEQUENT ENCOUNTER FOR FRACTURE WITH ROUTINE HEALING: ICD-10-CM

## 2017-06-05 DIAGNOSIS — L08.9 LOCAL INFECTION OF SKIN AND SUBCUTANEOUS TISSUE: ICD-10-CM

## 2017-06-05 PROCEDURE — 72070 X-RAY EXAM THORAC SPINE 2VWS: CPT

## 2017-06-06 ENCOUNTER — LAB (OUTPATIENT)
Dept: LAB | Facility: OTHER | Age: 82
End: 2017-06-06
Payer: MEDICARE

## 2017-06-06 DIAGNOSIS — L08.9 LOCAL INFECTION OF SKIN AND SUBCUTANEOUS TISSUE: ICD-10-CM

## 2017-06-06 LAB
BASOPHILS # BLD AUTO: 0.03 THOUSANDS/ΜL (ref 0–0.1)
BASOPHILS NFR BLD AUTO: 0 % (ref 0–1)
CRP SERPL QL: 6.6 MG/L
EOSINOPHIL # BLD AUTO: 0.13 THOUSAND/ΜL (ref 0–0.61)
EOSINOPHIL NFR BLD AUTO: 1 % (ref 0–6)
ERYTHROCYTE [DISTWIDTH] IN BLOOD BY AUTOMATED COUNT: 14.8 % (ref 11.6–15.1)
ERYTHROCYTE [SEDIMENTATION RATE] IN BLOOD: 23 MM/HOUR (ref 0–20)
HCT VFR BLD AUTO: 36 % (ref 34.8–46.1)
HGB BLD-MCNC: 11.6 G/DL (ref 11.5–15.4)
LYMPHOCYTES # BLD AUTO: 1.94 THOUSANDS/ΜL (ref 0.6–4.47)
LYMPHOCYTES NFR BLD AUTO: 19 % (ref 14–44)
MCH RBC QN AUTO: 29 PG (ref 26.8–34.3)
MCHC RBC AUTO-ENTMCNC: 32.2 G/DL (ref 31.4–37.4)
MCV RBC AUTO: 90 FL (ref 82–98)
MONOCYTES # BLD AUTO: 1.16 THOUSAND/ΜL (ref 0.17–1.22)
MONOCYTES NFR BLD AUTO: 11 % (ref 4–12)
NEUTROPHILS # BLD AUTO: 7.17 THOUSANDS/ΜL (ref 1.85–7.62)
NEUTS SEG NFR BLD AUTO: 69 % (ref 43–75)
NRBC BLD AUTO-RTO: 0 /100 WBCS
PLATELET # BLD AUTO: 423 THOUSANDS/UL (ref 149–390)
PMV BLD AUTO: 8.8 FL (ref 8.9–12.7)
RBC # BLD AUTO: 4 MILLION/UL (ref 3.81–5.12)
WBC # BLD AUTO: 10.46 THOUSAND/UL (ref 4.31–10.16)

## 2017-06-06 PROCEDURE — 36415 COLL VENOUS BLD VENIPUNCTURE: CPT

## 2017-06-06 PROCEDURE — 85652 RBC SED RATE AUTOMATED: CPT

## 2017-06-06 PROCEDURE — 85025 COMPLETE CBC W/AUTO DIFF WBC: CPT

## 2017-06-06 PROCEDURE — 86140 C-REACTIVE PROTEIN: CPT

## 2017-06-07 ENCOUNTER — GENERIC CONVERSION - ENCOUNTER (OUTPATIENT)
Dept: OTHER | Facility: OTHER | Age: 82
End: 2017-06-07

## 2017-06-12 ENCOUNTER — ALLSCRIPTS OFFICE VISIT (OUTPATIENT)
Dept: OTHER | Facility: OTHER | Age: 82
End: 2017-06-12

## 2017-06-15 RX ORDER — OXYCODONE HCL 10 MG/1
10 TABLET, FILM COATED, EXTENDED RELEASE ORAL EVERY 12 HOURS
COMMUNITY
End: 2017-10-26

## 2017-06-15 RX ORDER — METHOCARBAMOL 500 MG/1
500 TABLET, FILM COATED ORAL 4 TIMES DAILY
COMMUNITY
End: 2017-06-20 | Stop reason: HOSPADM

## 2017-06-15 RX ORDER — VITAMIN B COMPLEX
300 TABLET ORAL DAILY
COMMUNITY

## 2017-06-19 ENCOUNTER — ANESTHESIA EVENT (OUTPATIENT)
Dept: PERIOP | Facility: HOSPITAL | Age: 82
End: 2017-06-19
Payer: MEDICARE

## 2017-06-20 ENCOUNTER — APPOINTMENT (OUTPATIENT)
Dept: NON INVASIVE DIAGNOSTICS | Facility: HOSPITAL | Age: 82
End: 2017-06-20
Payer: MEDICARE

## 2017-06-20 ENCOUNTER — ANESTHESIA (OUTPATIENT)
Dept: PERIOP | Facility: HOSPITAL | Age: 82
End: 2017-06-20
Payer: MEDICARE

## 2017-06-20 ENCOUNTER — HOSPITAL ENCOUNTER (OUTPATIENT)
Facility: HOSPITAL | Age: 82
Setting detail: OUTPATIENT SURGERY
Discharge: HOME | End: 2017-06-20
Attending: ORTHOPAEDIC SURGERY | Admitting: ORTHOPAEDIC SURGERY
Payer: MEDICARE

## 2017-06-20 ENCOUNTER — HOSPITAL ENCOUNTER (OUTPATIENT)
Dept: NON INVASIVE DIAGNOSTICS | Facility: HOSPITAL | Age: 82
Discharge: HOME | End: 2017-06-20
Payer: MEDICARE

## 2017-06-20 VITALS
BODY MASS INDEX: 21.9 KG/M2 | HEART RATE: 89 BPM | DIASTOLIC BLOOD PRESSURE: 69 MMHG | SYSTOLIC BLOOD PRESSURE: 111 MMHG | TEMPERATURE: 100 F | WEIGHT: 119 LBS | RESPIRATION RATE: 20 BRPM | HEIGHT: 62 IN | OXYGEN SATURATION: 94 %

## 2017-06-20 DIAGNOSIS — R94.31 ABNORMAL EKG: ICD-10-CM

## 2017-06-20 DIAGNOSIS — L08.9 LOCAL INFECTION OF SKIN AND SUBCUTANEOUS TISSUE: ICD-10-CM

## 2017-06-20 DIAGNOSIS — R94.31 EKG ABNORMALITIES: Primary | ICD-10-CM

## 2017-06-20 LAB — GLUCOSE SERPL-MCNC: 108 MG/DL (ref 65–140)

## 2017-06-20 PROCEDURE — 93308 TTE F-UP OR LMTD: CPT

## 2017-06-20 PROCEDURE — 87070 CULTURE OTHR SPECIMN AEROBIC: CPT | Performed by: ORTHOPAEDIC SURGERY

## 2017-06-20 PROCEDURE — 82948 REAGENT STRIP/BLOOD GLUCOSE: CPT

## 2017-06-20 PROCEDURE — 87075 CULTR BACTERIA EXCEPT BLOOD: CPT | Performed by: ORTHOPAEDIC SURGERY

## 2017-06-20 PROCEDURE — 87205 SMEAR GRAM STAIN: CPT | Performed by: ORTHOPAEDIC SURGERY

## 2017-06-20 RX ORDER — CEPHALEXIN 500 MG/1
500 CAPSULE ORAL 2 TIMES DAILY
Qty: 28 CAPSULE | Refills: 0 | Status: SHIPPED | OUTPATIENT
Start: 2017-06-20 | End: 2017-07-04

## 2017-06-20 RX ORDER — OXYCODONE HYDROCHLORIDE 5 MG/1
TABLET ORAL
Qty: 30 TABLET | Refills: 0 | Status: SHIPPED | OUTPATIENT
Start: 2017-06-20 | End: 2017-10-26

## 2017-06-20 RX ORDER — ALBUTEROL SULFATE 2.5 MG/3ML
2.5 SOLUTION RESPIRATORY (INHALATION) ONCE
Status: COMPLETED | OUTPATIENT
Start: 2017-06-20 | End: 2017-06-20

## 2017-06-20 RX ORDER — SODIUM CHLORIDE, SODIUM LACTATE, POTASSIUM CHLORIDE, CALCIUM CHLORIDE 600; 310; 30; 20 MG/100ML; MG/100ML; MG/100ML; MG/100ML
50 INJECTION, SOLUTION INTRAVENOUS CONTINUOUS
Status: DISCONTINUED | OUTPATIENT
Start: 2017-06-20 | End: 2017-06-20 | Stop reason: HOSPADM

## 2017-06-20 RX ORDER — SUCCINYLCHOLINE/SOD CL,ISO/PF 100 MG/5ML
SYRINGE (ML) INTRAVENOUS AS NEEDED
Status: DISCONTINUED | OUTPATIENT
Start: 2017-06-20 | End: 2017-06-20 | Stop reason: SURG

## 2017-06-20 RX ORDER — SODIUM CHLORIDE FOR INHALATION 0.9 %
VIAL, NEBULIZER (ML) INHALATION
Status: DISCONTINUED
Start: 2017-06-20 | End: 2017-06-20 | Stop reason: HOSPADM

## 2017-06-20 RX ORDER — FENTANYL CITRATE/PF 50 MCG/ML
12.5 SYRINGE (ML) INJECTION
Status: DISCONTINUED | OUTPATIENT
Start: 2017-06-20 | End: 2017-06-20 | Stop reason: HOSPADM

## 2017-06-20 RX ORDER — ONDANSETRON 2 MG/ML
4 INJECTION INTRAMUSCULAR; INTRAVENOUS ONCE AS NEEDED
Status: DISCONTINUED | OUTPATIENT
Start: 2017-06-20 | End: 2017-06-20 | Stop reason: HOSPADM

## 2017-06-20 RX ORDER — FENTANYL CITRATE 50 UG/ML
INJECTION, SOLUTION INTRAMUSCULAR; INTRAVENOUS AS NEEDED
Status: DISCONTINUED | OUTPATIENT
Start: 2017-06-20 | End: 2017-06-20 | Stop reason: SURG

## 2017-06-20 RX ORDER — ONDANSETRON 2 MG/ML
4 INJECTION INTRAMUSCULAR; INTRAVENOUS EVERY 6 HOURS PRN
Status: DISCONTINUED | OUTPATIENT
Start: 2017-06-20 | End: 2017-06-20 | Stop reason: HOSPADM

## 2017-06-20 RX ORDER — ONDANSETRON 2 MG/ML
INJECTION INTRAMUSCULAR; INTRAVENOUS AS NEEDED
Status: DISCONTINUED | OUTPATIENT
Start: 2017-06-20 | End: 2017-06-20 | Stop reason: SURG

## 2017-06-20 RX ORDER — SODIUM CHLORIDE, SODIUM LACTATE, POTASSIUM CHLORIDE, CALCIUM CHLORIDE 600; 310; 30; 20 MG/100ML; MG/100ML; MG/100ML; MG/100ML
INJECTION, SOLUTION INTRAVENOUS CONTINUOUS PRN
Status: DISCONTINUED | OUTPATIENT
Start: 2017-06-20 | End: 2017-06-20 | Stop reason: SURG

## 2017-06-20 RX ORDER — CEFAZOLIN SODIUM 1 G/3ML
INJECTION, POWDER, FOR SOLUTION INTRAMUSCULAR; INTRAVENOUS AS NEEDED
Status: DISCONTINUED | OUTPATIENT
Start: 2017-06-20 | End: 2017-06-20 | Stop reason: SURG

## 2017-06-20 RX ORDER — ACETAMINOPHEN 325 MG/1
650 TABLET ORAL EVERY 6 HOURS PRN
Qty: 60 TABLET | Refills: 0 | Status: SHIPPED | OUTPATIENT
Start: 2017-06-20 | End: 2017-07-20

## 2017-06-20 RX ORDER — METHOCARBAMOL 750 MG/1
750 TABLET, FILM COATED ORAL 3 TIMES DAILY PRN
Qty: 60 TABLET | Refills: 0 | Status: SHIPPED | OUTPATIENT
Start: 2017-06-20 | End: 2017-10-26

## 2017-06-20 RX ORDER — SODIUM CHLORIDE, SODIUM LACTATE, POTASSIUM CHLORIDE, CALCIUM CHLORIDE 600; 310; 30; 20 MG/100ML; MG/100ML; MG/100ML; MG/100ML
20 INJECTION, SOLUTION INTRAVENOUS CONTINUOUS
Status: DISCONTINUED | OUTPATIENT
Start: 2017-06-20 | End: 2017-06-20 | Stop reason: HOSPADM

## 2017-06-20 RX ORDER — PROPOFOL 10 MG/ML
INJECTION, EMULSION INTRAVENOUS AS NEEDED
Status: DISCONTINUED | OUTPATIENT
Start: 2017-06-20 | End: 2017-06-20 | Stop reason: SURG

## 2017-06-20 RX ADMIN — SODIUM CHLORIDE, SODIUM LACTATE, POTASSIUM CHLORIDE, AND CALCIUM CHLORIDE: .6; .31; .03; .02 INJECTION, SOLUTION INTRAVENOUS at 12:33

## 2017-06-20 RX ADMIN — Medication 100 MG: at 12:36

## 2017-06-20 RX ADMIN — FENTANYL CITRATE 12.5 MCG: 50 INJECTION INTRAMUSCULAR; INTRAVENOUS at 14:19

## 2017-06-20 RX ADMIN — FENTANYL CITRATE 12.5 MCG: 50 INJECTION INTRAMUSCULAR; INTRAVENOUS at 14:16

## 2017-06-20 RX ADMIN — ONDANSETRON 4 MG: 2 INJECTION INTRAMUSCULAR; INTRAVENOUS at 13:12

## 2017-06-20 RX ADMIN — SODIUM CHLORIDE, SODIUM LACTATE, POTASSIUM CHLORIDE, AND CALCIUM CHLORIDE 20 ML/HR: .6; .31; .03; .02 INJECTION, SOLUTION INTRAVENOUS at 12:33

## 2017-06-20 RX ADMIN — ALBUTEROL SULFATE 2.5 MG: 2.5 SOLUTION RESPIRATORY (INHALATION) at 12:04

## 2017-06-20 RX ADMIN — FENTANYL CITRATE 25 MCG: 50 INJECTION, SOLUTION INTRAMUSCULAR; INTRAVENOUS at 12:36

## 2017-06-20 RX ADMIN — FENTANYL CITRATE 25 MCG: 50 INJECTION, SOLUTION INTRAMUSCULAR; INTRAVENOUS at 12:53

## 2017-06-20 RX ADMIN — PROPOFOL 125 MG: 10 INJECTION, EMULSION INTRAVENOUS at 12:36

## 2017-06-20 RX ADMIN — CEFAZOLIN 2000 MG: 1 INJECTION, POWDER, FOR SOLUTION INTRAVENOUS at 13:12

## 2017-06-23 LAB
BACTERIA WND AEROBE CULT: NO GROWTH
GRAM STN SPEC: NORMAL
GRAM STN SPEC: NORMAL

## 2017-06-24 LAB — BACTERIA SPEC ANAEROBE CULT: NO GROWTH

## 2017-07-03 ENCOUNTER — HOSPITAL ENCOUNTER (OUTPATIENT)
Dept: RADIOLOGY | Facility: HOSPITAL | Age: 82
Discharge: HOME | End: 2017-07-03
Attending: ORTHOPAEDIC SURGERY
Payer: MEDICARE

## 2017-07-03 ENCOUNTER — ALLSCRIPTS OFFICE VISIT (OUTPATIENT)
Dept: OTHER | Facility: OTHER | Age: 82
End: 2017-07-03

## 2017-07-03 DIAGNOSIS — S22.080D WEDGE COMPRESSION FRACTURE OF T11-T12 VERTEBRA, SUBSEQUENT ENCOUNTER FOR FRACTURE WITH ROUTINE HEALING: ICD-10-CM

## 2017-07-03 PROCEDURE — 72070 X-RAY EXAM THORAC SPINE 2VWS: CPT

## 2017-08-10 ENCOUNTER — ALLSCRIPTS OFFICE VISIT (OUTPATIENT)
Dept: OTHER | Facility: OTHER | Age: 82
End: 2017-08-10

## 2017-08-10 ENCOUNTER — HOSPITAL ENCOUNTER (OUTPATIENT)
Dept: RADIOLOGY | Facility: HOSPITAL | Age: 82
Discharge: HOME | End: 2017-08-10
Attending: ORTHOPAEDIC SURGERY
Payer: MEDICARE

## 2017-08-10 DIAGNOSIS — S22.080D WEDGE COMPRESSION FRACTURE OF T11-T12 VERTEBRA, SUBSEQUENT ENCOUNTER FOR FRACTURE WITH ROUTINE HEALING: ICD-10-CM

## 2017-08-10 PROCEDURE — 72070 X-RAY EXAM THORAC SPINE 2VWS: CPT

## 2017-09-14 ENCOUNTER — HOSPITAL ENCOUNTER (OUTPATIENT)
Dept: RADIOLOGY | Facility: HOSPITAL | Age: 82
Discharge: HOME | End: 2017-09-14
Attending: ORTHOPAEDIC SURGERY
Payer: MEDICARE

## 2017-09-14 ENCOUNTER — ALLSCRIPTS OFFICE VISIT (OUTPATIENT)
Dept: OTHER | Facility: OTHER | Age: 82
End: 2017-09-14

## 2017-09-14 DIAGNOSIS — S22.080D WEDGE COMPRESSION FRACTURE OF T11-T12 VERTEBRA, SUBSEQUENT ENCOUNTER FOR FRACTURE WITH ROUTINE HEALING: ICD-10-CM

## 2017-09-14 DIAGNOSIS — S22.058D: ICD-10-CM

## 2017-09-14 PROCEDURE — 72070 X-RAY EXAM THORAC SPINE 2VWS: CPT

## 2017-10-09 ENCOUNTER — GENERIC CONVERSION - ENCOUNTER (OUTPATIENT)
Dept: OTHER | Facility: OTHER | Age: 82
End: 2017-10-09

## 2017-10-09 ENCOUNTER — HOSPITAL ENCOUNTER (OUTPATIENT)
Dept: RADIOLOGY | Facility: HOSPITAL | Age: 82
Discharge: HOME | End: 2017-10-09
Attending: ORTHOPAEDIC SURGERY
Payer: MEDICARE

## 2017-10-09 DIAGNOSIS — S22.058D: ICD-10-CM

## 2017-10-09 PROCEDURE — 72070 X-RAY EXAM THORAC SPINE 2VWS: CPT

## 2017-10-23 ENCOUNTER — TRANSCRIBE ORDERS (OUTPATIENT)
Dept: LAB | Facility: HOSPITAL | Age: 82
End: 2017-10-23

## 2017-10-23 ENCOUNTER — GENERIC CONVERSION - ENCOUNTER (OUTPATIENT)
Dept: OTHER | Facility: OTHER | Age: 82
End: 2017-10-23

## 2017-10-23 ENCOUNTER — LAB (OUTPATIENT)
Dept: LAB | Facility: HOSPITAL | Age: 82
End: 2017-10-23
Attending: ORTHOPAEDIC SURGERY
Payer: MEDICARE

## 2017-10-23 DIAGNOSIS — Z98.890 OTHER SPECIFIED POSTPROCEDURAL STATES: ICD-10-CM

## 2017-10-23 DIAGNOSIS — L08.9 INFECTION OF SKIN AND SUBCUTANEOUS TISSUE: ICD-10-CM

## 2017-10-23 DIAGNOSIS — L08.9 INFECTION OF SKIN AND SUBCUTANEOUS TISSUE: Primary | ICD-10-CM

## 2017-10-23 DIAGNOSIS — L08.9 LOCAL INFECTION OF SKIN AND SUBCUTANEOUS TISSUE: ICD-10-CM

## 2017-10-23 LAB
ALBUMIN SERPL BCP-MCNC: 4.1 G/DL (ref 3.5–5)
ALP SERPL-CCNC: 57 U/L (ref 46–116)
ALT SERPL W P-5'-P-CCNC: 28 U/L (ref 12–78)
ANION GAP SERPL CALCULATED.3IONS-SCNC: 6 MMOL/L (ref 4–13)
AST SERPL W P-5'-P-CCNC: 17 U/L (ref 5–45)
ATRIAL RATE: 70 BPM
BASOPHILS # BLD AUTO: 0.04 THOUSANDS/ΜL (ref 0–0.1)
BASOPHILS NFR BLD AUTO: 1 % (ref 0–1)
BILIRUB SERPL-MCNC: 0.43 MG/DL (ref 0.2–1)
BUN SERPL-MCNC: 17 MG/DL (ref 5–25)
CALCIUM SERPL-MCNC: 9.7 MG/DL (ref 8.3–10.1)
CHLORIDE SERPL-SCNC: 94 MMOL/L (ref 100–108)
CO2 SERPL-SCNC: 31 MMOL/L (ref 21–32)
CREAT SERPL-MCNC: 0.59 MG/DL (ref 0.6–1.3)
EOSINOPHIL # BLD AUTO: 0.26 THOUSAND/ΜL (ref 0–0.61)
EOSINOPHIL NFR BLD AUTO: 4 % (ref 0–6)
ERYTHROCYTE [DISTWIDTH] IN BLOOD BY AUTOMATED COUNT: 14.9 % (ref 11.6–15.1)
GFR SERPL CREATININE-BSD FRML MDRD: 87 ML/MIN/1.73SQ M
GLUCOSE SERPL-MCNC: 119 MG/DL (ref 65–140)
HCT VFR BLD AUTO: 37.1 % (ref 34.8–46.1)
HGB BLD-MCNC: 12.4 G/DL (ref 11.5–15.4)
INR PPP: 1.09 (ref 0.86–1.16)
LYMPHOCYTES # BLD AUTO: 1.82 THOUSANDS/ΜL (ref 0.6–4.47)
LYMPHOCYTES NFR BLD AUTO: 28 % (ref 14–44)
MCH RBC QN AUTO: 29.8 PG (ref 26.8–34.3)
MCHC RBC AUTO-ENTMCNC: 33.4 G/DL (ref 31.4–37.4)
MCV RBC AUTO: 89 FL (ref 82–98)
MONOCYTES # BLD AUTO: 0.76 THOUSAND/ΜL (ref 0.17–1.22)
MONOCYTES NFR BLD AUTO: 12 % (ref 4–12)
NEUTROPHILS # BLD AUTO: 3.63 THOUSANDS/ΜL (ref 1.85–7.62)
NEUTS SEG NFR BLD AUTO: 55 % (ref 43–75)
NRBC BLD AUTO-RTO: 0 /100 WBCS
P AXIS: 74 DEGREES
PLATELET # BLD AUTO: 271 THOUSANDS/UL (ref 149–390)
PMV BLD AUTO: 9.5 FL (ref 8.9–12.7)
POTASSIUM SERPL-SCNC: 4.7 MMOL/L (ref 3.5–5.3)
PR INTERVAL: 164 MS
PROT SERPL-MCNC: 7.6 G/DL (ref 6.4–8.2)
PROTHROMBIN TIME: 14.1 SECONDS (ref 12.1–14.4)
QRS AXIS: 20 DEGREES
QRSD INTERVAL: 90 MS
QT INTERVAL: 398 MS
QTC INTERVAL: 429 MS
RBC # BLD AUTO: 4.16 MILLION/UL (ref 3.81–5.12)
SODIUM SERPL-SCNC: 131 MMOL/L (ref 136–145)
T WAVE AXIS: 80 DEGREES
VENTRICULAR RATE: 70 BPM
WBC # BLD AUTO: 6.52 THOUSAND/UL (ref 4.31–10.16)

## 2017-10-23 PROCEDURE — 85025 COMPLETE CBC W/AUTO DIFF WBC: CPT

## 2017-10-23 PROCEDURE — 86850 RBC ANTIBODY SCREEN: CPT

## 2017-10-23 PROCEDURE — 36415 COLL VENOUS BLD VENIPUNCTURE: CPT

## 2017-10-23 PROCEDURE — 86901 BLOOD TYPING SEROLOGIC RH(D): CPT

## 2017-10-23 PROCEDURE — 86921 COMPATIBILITY TEST INCUBATE: CPT

## 2017-10-23 PROCEDURE — 86900 BLOOD TYPING SEROLOGIC ABO: CPT

## 2017-10-23 PROCEDURE — 86922 COMPATIBILITY TEST ANTIGLOB: CPT

## 2017-10-23 PROCEDURE — 86870 RBC ANTIBODY IDENTIFICATION: CPT

## 2017-10-23 PROCEDURE — 80053 COMPREHEN METABOLIC PANEL: CPT

## 2017-10-23 PROCEDURE — 85610 PROTHROMBIN TIME: CPT

## 2017-10-23 PROCEDURE — 93005 ELECTROCARDIOGRAM TRACING: CPT

## 2017-10-24 LAB — BLOOD GROUP ANTIBODIES SERPL: NORMAL

## 2017-10-25 ENCOUNTER — HOSPITAL ENCOUNTER (OUTPATIENT)
Dept: RADIOLOGY | Facility: HOSPITAL | Age: 82
Discharge: HOME | End: 2017-10-25
Attending: ORTHOPAEDIC SURGERY
Payer: MEDICARE

## 2017-10-25 ENCOUNTER — TRANSCRIBE ORDERS (OUTPATIENT)
Dept: ADMINISTRATIVE | Facility: HOSPITAL | Age: 82
End: 2017-10-25

## 2017-10-25 DIAGNOSIS — L08.9 LOCAL INFECTION OF SKIN AND SUBCUTANEOUS TISSUE: ICD-10-CM

## 2017-10-25 PROCEDURE — 71020 HB CHEST X-RAY 2VW FRONTAL&LATL: CPT

## 2017-10-30 LAB
ABO GROUP BLD: NORMAL
BLD GP AB SCN SERPL QL: POSITIVE
RH BLD: NEGATIVE
SPECIMEN EXPIRATION DATE: NORMAL

## 2017-11-13 ENCOUNTER — GENERIC CONVERSION - ENCOUNTER (OUTPATIENT)
Dept: OTHER | Facility: OTHER | Age: 82
End: 2017-11-13

## 2017-11-15 ENCOUNTER — APPOINTMENT (OUTPATIENT)
Dept: LAB | Facility: OTHER | Age: 82
End: 2017-11-15
Payer: MEDICARE

## 2017-11-15 ENCOUNTER — TRANSCRIBE ORDERS (OUTPATIENT)
Dept: LAB | Facility: OTHER | Age: 82
End: 2017-11-15

## 2017-11-15 DIAGNOSIS — Z98.890 OTHER SPECIFIED POSTPROCEDURAL STATES: ICD-10-CM

## 2017-11-15 LAB
ALBUMIN SERPL BCP-MCNC: 3.8 G/DL (ref 3.5–5)
ALP SERPL-CCNC: 55 U/L (ref 46–116)
ALT SERPL W P-5'-P-CCNC: 24 U/L (ref 12–78)
ANION GAP SERPL CALCULATED.3IONS-SCNC: 5 MMOL/L (ref 4–13)
AST SERPL W P-5'-P-CCNC: 16 U/L (ref 5–45)
BASOPHILS # BLD AUTO: 0.04 THOUSANDS/ΜL (ref 0–0.1)
BASOPHILS NFR BLD AUTO: 1 % (ref 0–1)
BILIRUB SERPL-MCNC: 0.39 MG/DL (ref 0.2–1)
BUN SERPL-MCNC: 17 MG/DL (ref 5–25)
CALCIUM SERPL-MCNC: 9.2 MG/DL (ref 8.3–10.1)
CHLORIDE SERPL-SCNC: 99 MMOL/L (ref 100–108)
CO2 SERPL-SCNC: 29 MMOL/L (ref 21–32)
CREAT SERPL-MCNC: 0.64 MG/DL (ref 0.6–1.3)
EOSINOPHIL # BLD AUTO: 0.33 THOUSAND/ΜL (ref 0–0.61)
EOSINOPHIL NFR BLD AUTO: 6 % (ref 0–6)
ERYTHROCYTE [DISTWIDTH] IN BLOOD BY AUTOMATED COUNT: 14.6 % (ref 11.6–15.1)
GFR SERPL CREATININE-BSD FRML MDRD: 85 ML/MIN/1.73SQ M
GLUCOSE P FAST SERPL-MCNC: 95 MG/DL (ref 65–99)
HCT VFR BLD AUTO: 35.6 % (ref 34.8–46.1)
HGB BLD-MCNC: 11.9 G/DL (ref 11.5–15.4)
INR PPP: 1.19 (ref 0.86–1.16)
LYMPHOCYTES # BLD AUTO: 1.65 THOUSANDS/ΜL (ref 0.6–4.47)
LYMPHOCYTES NFR BLD AUTO: 29 % (ref 14–44)
MCH RBC QN AUTO: 30.6 PG (ref 26.8–34.3)
MCHC RBC AUTO-ENTMCNC: 33.4 G/DL (ref 31.4–37.4)
MCV RBC AUTO: 92 FL (ref 82–98)
MONOCYTES # BLD AUTO: 0.84 THOUSAND/ΜL (ref 0.17–1.22)
MONOCYTES NFR BLD AUTO: 15 % (ref 4–12)
NEUTROPHILS # BLD AUTO: 2.84 THOUSANDS/ΜL (ref 1.85–7.62)
NEUTS SEG NFR BLD AUTO: 49 % (ref 43–75)
NRBC BLD AUTO-RTO: 0 /100 WBCS
PLATELET # BLD AUTO: 275 THOUSANDS/UL (ref 149–390)
PMV BLD AUTO: 9.8 FL (ref 8.9–12.7)
POTASSIUM SERPL-SCNC: 4.9 MMOL/L (ref 3.5–5.3)
PROT SERPL-MCNC: 7.1 G/DL (ref 6.4–8.2)
PROTHROMBIN TIME: 15.2 SECONDS (ref 12.1–14.4)
RBC # BLD AUTO: 3.89 MILLION/UL (ref 3.81–5.12)
SODIUM SERPL-SCNC: 133 MMOL/L (ref 136–145)
WBC # BLD AUTO: 5.71 THOUSAND/UL (ref 4.31–10.16)

## 2017-11-15 PROCEDURE — 86870 RBC ANTIBODY IDENTIFICATION: CPT | Performed by: ORTHOPAEDIC SURGERY

## 2017-11-15 PROCEDURE — 86850 RBC ANTIBODY SCREEN: CPT | Performed by: ORTHOPAEDIC SURGERY

## 2017-11-15 PROCEDURE — 85610 PROTHROMBIN TIME: CPT

## 2017-11-15 PROCEDURE — 86900 BLOOD TYPING SEROLOGIC ABO: CPT | Performed by: ORTHOPAEDIC SURGERY

## 2017-11-15 PROCEDURE — 80053 COMPREHEN METABOLIC PANEL: CPT

## 2017-11-15 PROCEDURE — 85025 COMPLETE CBC W/AUTO DIFF WBC: CPT

## 2017-11-15 PROCEDURE — 86901 BLOOD TYPING SEROLOGIC RH(D): CPT | Performed by: ORTHOPAEDIC SURGERY

## 2017-11-15 PROCEDURE — 36415 COLL VENOUS BLD VENIPUNCTURE: CPT

## 2017-11-16 ENCOUNTER — LAB REQUISITION (OUTPATIENT)
Dept: LAB | Facility: HOSPITAL | Age: 82
End: 2017-11-16
Payer: MEDICARE

## 2017-11-16 DIAGNOSIS — Z98.890 OTHER SPECIFIED POSTPROCEDURAL STATES: ICD-10-CM

## 2017-11-16 LAB
ABO GROUP BLD: NORMAL
BLD GP AB SCN SERPL QL: POSITIVE
BLOOD GROUP ANTIBODIES SERPL: NORMAL
RH BLD: NEGATIVE
SPECIMEN EXPIRATION DATE: NORMAL

## 2017-11-27 ENCOUNTER — ANESTHESIA EVENT (OUTPATIENT)
Dept: PERIOP | Facility: HOSPITAL | Age: 82
DRG: 857 | End: 2017-11-27
Payer: MEDICARE

## 2017-11-28 ENCOUNTER — HOSPITAL ENCOUNTER (INPATIENT)
Facility: HOSPITAL | Age: 82
LOS: 1 days | Discharge: HOME | DRG: 857 | End: 2017-11-29
Attending: ORTHOPAEDIC SURGERY | Admitting: ORTHOPAEDIC SURGERY
Payer: MEDICARE

## 2017-11-28 ENCOUNTER — ANESTHESIA (OUTPATIENT)
Dept: PERIOP | Facility: HOSPITAL | Age: 82
DRG: 857 | End: 2017-11-28
Payer: MEDICARE

## 2017-11-28 DIAGNOSIS — E11.9 TYPE 2 DIABETES MELLITUS WITHOUT COMPLICATION, UNSPECIFIED LONG TERM INSULIN USE STATUS: Primary | ICD-10-CM

## 2017-11-28 DIAGNOSIS — J44.9 CHRONIC OBSTRUCTIVE PULMONARY DISEASE, UNSPECIFIED COPD TYPE (HCC): ICD-10-CM

## 2017-11-28 DIAGNOSIS — L08.9 LOCAL INFECTION OF SKIN AND SUBCUTANEOUS TISSUE: ICD-10-CM

## 2017-11-28 LAB
GLUCOSE SERPL-MCNC: 125 MG/DL (ref 65–140)
GLUCOSE SERPL-MCNC: 89 MG/DL (ref 65–140)
GLUCOSE SERPL-MCNC: 97 MG/DL (ref 65–140)

## 2017-11-28 PROCEDURE — 86921 COMPATIBILITY TEST INCUBATE: CPT

## 2017-11-28 PROCEDURE — 86922 COMPATIBILITY TEST ANTIGLOB: CPT

## 2017-11-28 PROCEDURE — 94760 N-INVAS EAR/PLS OXIMETRY 1: CPT

## 2017-11-28 PROCEDURE — 87205 SMEAR GRAM STAIN: CPT | Performed by: ORTHOPAEDIC SURGERY

## 2017-11-28 PROCEDURE — 87070 CULTURE OTHR SPECIMN AEROBIC: CPT | Performed by: ORTHOPAEDIC SURGERY

## 2017-11-28 PROCEDURE — 82948 REAGENT STRIP/BLOOD GLUCOSE: CPT

## 2017-11-28 PROCEDURE — 88304 TISSUE EXAM BY PATHOLOGIST: CPT | Performed by: ORTHOPAEDIC SURGERY

## 2017-11-28 PROCEDURE — 0JB70ZZ EXCISION OF BACK SUBCUTANEOUS TISSUE AND FASCIA, OPEN APPROACH: ICD-10-PCS | Performed by: ORTHOPAEDIC SURGERY

## 2017-11-28 PROCEDURE — 87075 CULTR BACTERIA EXCEPT BLOOD: CPT | Performed by: ORTHOPAEDIC SURGERY

## 2017-11-28 PROCEDURE — 87176 TISSUE HOMOGENIZATION CULTR: CPT | Performed by: ORTHOPAEDIC SURGERY

## 2017-11-28 RX ORDER — OXYCODONE HYDROCHLORIDE 5 MG/1
2.5 TABLET ORAL EVERY 4 HOURS PRN
Qty: 20 TABLET | Refills: 0 | Status: SHIPPED | OUTPATIENT
Start: 2017-11-28 | End: 2017-12-08

## 2017-11-28 RX ORDER — MONTELUKAST SODIUM 10 MG/1
10 TABLET ORAL
Status: DISCONTINUED | OUTPATIENT
Start: 2017-11-28 | End: 2017-11-29 | Stop reason: HOSPADM

## 2017-11-28 RX ORDER — DOCUSATE SODIUM 100 MG/1
100 CAPSULE, LIQUID FILLED ORAL 2 TIMES DAILY
Status: DISCONTINUED | OUTPATIENT
Start: 2017-11-28 | End: 2017-11-29 | Stop reason: HOSPADM

## 2017-11-28 RX ORDER — NEOMYCIN AND POLYMYXIN B SULFATES 40; 200000 MG/ML; [USP'U]/ML
SOLUTION IRRIGATION AS NEEDED
Status: DISCONTINUED | OUTPATIENT
Start: 2017-11-28 | End: 2017-11-28 | Stop reason: HOSPADM

## 2017-11-28 RX ORDER — SODIUM CHLORIDE, SODIUM LACTATE, POTASSIUM CHLORIDE, CALCIUM CHLORIDE 600; 310; 30; 20 MG/100ML; MG/100ML; MG/100ML; MG/100ML
50 INJECTION, SOLUTION INTRAVENOUS CONTINUOUS
Status: DISCONTINUED | OUTPATIENT
Start: 2017-11-28 | End: 2017-11-29 | Stop reason: HOSPADM

## 2017-11-28 RX ORDER — ACETAMINOPHEN 325 MG/1
650 TABLET ORAL EVERY 6 HOURS PRN
Status: DISCONTINUED | OUTPATIENT
Start: 2017-11-28 | End: 2017-11-29 | Stop reason: HOSPADM

## 2017-11-28 RX ORDER — GABAPENTIN 300 MG/1
300 CAPSULE ORAL
Status: DISCONTINUED | OUTPATIENT
Start: 2017-11-28 | End: 2017-11-29 | Stop reason: HOSPADM

## 2017-11-28 RX ORDER — ONDANSETRON 2 MG/ML
4 INJECTION INTRAMUSCULAR; INTRAVENOUS ONCE AS NEEDED
Status: DISCONTINUED | OUTPATIENT
Start: 2017-11-28 | End: 2017-11-28 | Stop reason: HOSPADM

## 2017-11-28 RX ORDER — OXYCODONE HYDROCHLORIDE 5 MG/1
2.5 TABLET ORAL EVERY 6 HOURS PRN
Status: DISCONTINUED | OUTPATIENT
Start: 2017-11-28 | End: 2017-11-29 | Stop reason: HOSPADM

## 2017-11-28 RX ORDER — METOCLOPRAMIDE HYDROCHLORIDE 5 MG/ML
INJECTION INTRAMUSCULAR; INTRAVENOUS AS NEEDED
Status: DISCONTINUED | OUTPATIENT
Start: 2017-11-28 | End: 2017-11-28 | Stop reason: SURG

## 2017-11-28 RX ORDER — FENTANYL CITRATE 50 UG/ML
INJECTION, SOLUTION INTRAMUSCULAR; INTRAVENOUS AS NEEDED
Status: DISCONTINUED | OUTPATIENT
Start: 2017-11-28 | End: 2017-11-28 | Stop reason: SURG

## 2017-11-28 RX ORDER — CARVEDILOL 3.12 MG/1
3.12 TABLET ORAL 2 TIMES DAILY WITH MEALS
Status: DISCONTINUED | OUTPATIENT
Start: 2017-11-29 | End: 2017-11-29 | Stop reason: HOSPADM

## 2017-11-28 RX ORDER — ONDANSETRON 2 MG/ML
4 INJECTION INTRAMUSCULAR; INTRAVENOUS EVERY 6 HOURS PRN
Status: DISCONTINUED | OUTPATIENT
Start: 2017-11-28 | End: 2017-11-29 | Stop reason: HOSPADM

## 2017-11-28 RX ORDER — CARVEDILOL 3.12 MG/1
3.12 TABLET ORAL 2 TIMES DAILY WITH MEALS
Status: DISCONTINUED | OUTPATIENT
Start: 2017-11-28 | End: 2017-11-28

## 2017-11-28 RX ORDER — METHOCARBAMOL 750 MG/1
750 TABLET, FILM COATED ORAL 4 TIMES DAILY PRN
Status: DISCONTINUED | OUTPATIENT
Start: 2017-11-28 | End: 2017-11-29 | Stop reason: HOSPADM

## 2017-11-28 RX ORDER — ALBUTEROL SULFATE 2.5 MG/3ML
2.5 SOLUTION RESPIRATORY (INHALATION) EVERY 6 HOURS PRN
Status: DISCONTINUED | OUTPATIENT
Start: 2017-11-28 | End: 2017-11-29 | Stop reason: HOSPADM

## 2017-11-28 RX ORDER — MELATONIN
1000 DAILY
Status: DISCONTINUED | OUTPATIENT
Start: 2017-11-28 | End: 2017-11-29 | Stop reason: HOSPADM

## 2017-11-28 RX ORDER — LISINOPRIL 2.5 MG/1
2.5 TABLET ORAL
Status: DISCONTINUED | OUTPATIENT
Start: 2017-11-28 | End: 2017-11-28

## 2017-11-28 RX ORDER — ONDANSETRON 2 MG/ML
INJECTION INTRAMUSCULAR; INTRAVENOUS AS NEEDED
Status: DISCONTINUED | OUTPATIENT
Start: 2017-11-28 | End: 2017-11-28 | Stop reason: SURG

## 2017-11-28 RX ORDER — TRAMADOL HYDROCHLORIDE 50 MG/1
50 TABLET ORAL EVERY 6 HOURS SCHEDULED
Status: DISCONTINUED | OUTPATIENT
Start: 2017-11-28 | End: 2017-11-29 | Stop reason: HOSPADM

## 2017-11-28 RX ORDER — SODIUM CHLORIDE 9 MG/ML
75 INJECTION, SOLUTION INTRAVENOUS CONTINUOUS
Status: CANCELLED | OUTPATIENT
Start: 2017-11-28 | End: 2017-11-29

## 2017-11-28 RX ORDER — ATORVASTATIN CALCIUM 80 MG/1
80 TABLET, FILM COATED ORAL DAILY
Status: DISCONTINUED | OUTPATIENT
Start: 2017-11-28 | End: 2017-11-29 | Stop reason: HOSPADM

## 2017-11-28 RX ORDER — SUCCINYLCHOLINE CHLORIDE 20 MG/ML
INJECTION INTRAMUSCULAR; INTRAVENOUS AS NEEDED
Status: DISCONTINUED | OUTPATIENT
Start: 2017-11-28 | End: 2017-11-28 | Stop reason: SURG

## 2017-11-28 RX ORDER — EPHEDRINE SULFATE 50 MG/ML
INJECTION, SOLUTION INTRAVENOUS AS NEEDED
Status: DISCONTINUED | OUTPATIENT
Start: 2017-11-28 | End: 2017-11-28 | Stop reason: SURG

## 2017-11-28 RX ORDER — LISINOPRIL 2.5 MG/1
2.5 TABLET ORAL
Status: DISCONTINUED | OUTPATIENT
Start: 2017-11-28 | End: 2017-11-29 | Stop reason: HOSPADM

## 2017-11-28 RX ORDER — LIDOCAINE HYDROCHLORIDE 10 MG/ML
INJECTION, SOLUTION INFILTRATION; PERINEURAL AS NEEDED
Status: DISCONTINUED | OUTPATIENT
Start: 2017-11-28 | End: 2017-11-28 | Stop reason: SURG

## 2017-11-28 RX ORDER — SULFAMETHOXAZOLE AND TRIMETHOPRIM 800; 160 MG/1; MG/1
1 TABLET ORAL 2 TIMES DAILY
Qty: 14 TABLET | Refills: 0 | Status: SHIPPED | OUTPATIENT
Start: 2017-11-28 | End: 2017-12-05

## 2017-11-28 RX ORDER — PROPOFOL 10 MG/ML
INJECTION, EMULSION INTRAVENOUS AS NEEDED
Status: DISCONTINUED | OUTPATIENT
Start: 2017-11-28 | End: 2017-11-28 | Stop reason: SURG

## 2017-11-28 RX ORDER — FENTANYL CITRATE/PF 50 MCG/ML
25 SYRINGE (ML) INJECTION
Status: DISCONTINUED | OUTPATIENT
Start: 2017-11-28 | End: 2017-11-28 | Stop reason: HOSPADM

## 2017-11-28 RX ORDER — SODIUM CHLORIDE, SODIUM LACTATE, POTASSIUM CHLORIDE, CALCIUM CHLORIDE 600; 310; 30; 20 MG/100ML; MG/100ML; MG/100ML; MG/100ML
75 INJECTION, SOLUTION INTRAVENOUS CONTINUOUS
Status: DISCONTINUED | OUTPATIENT
Start: 2017-11-28 | End: 2017-11-28

## 2017-11-28 RX ADMIN — ONDANSETRON 4 MG: 2 INJECTION INTRAMUSCULAR; INTRAVENOUS at 09:55

## 2017-11-28 RX ADMIN — DOCUSATE SODIUM 100 MG: 100 CAPSULE, LIQUID FILLED ORAL at 17:46

## 2017-11-28 RX ADMIN — LIDOCAINE HYDROCHLORIDE 50 MG: 10 INJECTION, SOLUTION INFILTRATION; PERINEURAL at 09:17

## 2017-11-28 RX ADMIN — VITAMIN D, TAB 1000IU (100/BT) 1000 UNITS: 25 TAB at 13:16

## 2017-11-28 RX ADMIN — SODIUM CHLORIDE, SODIUM LACTATE, POTASSIUM CHLORIDE, AND CALCIUM CHLORIDE 50 ML/HR: .6; .31; .03; .02 INJECTION, SOLUTION INTRAVENOUS at 07:30

## 2017-11-28 RX ADMIN — ACETAMINOPHEN 650 MG: 325 TABLET, FILM COATED ORAL at 15:54

## 2017-11-28 RX ADMIN — TRAMADOL HYDROCHLORIDE 50 MG: 50 TABLET, FILM COATED ORAL at 13:16

## 2017-11-28 RX ADMIN — FENTANYL CITRATE 25 MCG: 50 INJECTION, SOLUTION INTRAMUSCULAR; INTRAVENOUS at 09:17

## 2017-11-28 RX ADMIN — SODIUM CHLORIDE, SODIUM LACTATE, POTASSIUM CHLORIDE, AND CALCIUM CHLORIDE: .6; .31; .03; .02 INJECTION, SOLUTION INTRAVENOUS at 10:18

## 2017-11-28 RX ADMIN — METHOCARBAMOL 750 MG: 750 TABLET ORAL at 13:16

## 2017-11-28 RX ADMIN — DOCUSATE SODIUM 100 MG: 100 CAPSULE, LIQUID FILLED ORAL at 13:16

## 2017-11-28 RX ADMIN — SUCCINYLCHOLINE CHLORIDE 60 MG: 20 INJECTION, SOLUTION INTRAMUSCULAR; INTRAVENOUS at 09:18

## 2017-11-28 RX ADMIN — PROPOFOL 80 MG: 10 INJECTION, EMULSION INTRAVENOUS at 09:17

## 2017-11-28 RX ADMIN — CEFAZOLIN SODIUM 2000 MG: 2 SOLUTION INTRAVENOUS at 09:46

## 2017-11-28 RX ADMIN — EPHEDRINE SULFATE 5 MG: 50 INJECTION, SOLUTION INTRAMUSCULAR; INTRAVENOUS; SUBCUTANEOUS at 09:49

## 2017-11-28 RX ADMIN — ZINC 1 TABLET: TAB ORAL at 15:54

## 2017-11-28 RX ADMIN — EPHEDRINE SULFATE 5 MG: 50 INJECTION, SOLUTION INTRAMUSCULAR; INTRAVENOUS; SUBCUTANEOUS at 09:34

## 2017-11-28 RX ADMIN — ATORVASTATIN CALCIUM 80 MG: 80 TABLET, FILM COATED ORAL at 13:16

## 2017-11-28 RX ADMIN — METFORMIN HYDROCHLORIDE 500 MG: 500 TABLET, FILM COATED ORAL at 15:54

## 2017-11-28 RX ADMIN — GABAPENTIN 300 MG: 300 CAPSULE ORAL at 21:22

## 2017-11-28 RX ADMIN — FENTANYL CITRATE 50 MCG: 50 INJECTION, SOLUTION INTRAMUSCULAR; INTRAVENOUS at 09:42

## 2017-11-28 RX ADMIN — METOCLOPRAMIDE 10 MG: 5 INJECTION, SOLUTION INTRAMUSCULAR; INTRAVENOUS at 09:51

## 2017-11-28 RX ADMIN — CEFAZOLIN SODIUM 2000 MG: 2 SOLUTION INTRAVENOUS at 17:46

## 2017-11-28 RX ADMIN — NEOMYCIN AND POLYMYXIN B SULFATES: 40; 200000 SOLUTION IRRIGATION at 12:01

## 2017-11-28 RX ADMIN — MONTELUKAST SODIUM 10 MG: 10 TABLET, FILM COATED ORAL at 21:22

## 2017-11-28 RX ADMIN — TRAMADOL HYDROCHLORIDE 50 MG: 50 TABLET, FILM COATED ORAL at 17:46

## 2017-11-28 NOTE — CONSULTS
Consultation - Shaun Cha [de-identified] y o  female MRN: [id number]    Unit/Bed#: [facility] 902-01 Encounter: [id number]        History of Present Illness     HPI: Shaun Cha is a [de-identified]y o  year old female with a history of COPD with chronic hypoxic respiratory failure on 2Liters, diabetes, hypertension, NICM, chronic diastolic heart failure who presented today under orthopedic service for I&D and debridement of local thoracic skin infection and dehiscence  She underwent her original surgery in April in 2017 which consisted of a thoracic spinal fusion and has had ongoing intermittent wound drainage and dehiscence  She had prior admission in 2017 for I&D as well  Operative findings from today revealed a 2mm dehiscence with draining sinus  There was no reported purulence or deep collection We are asked to follow along for medical management    ROS:  Constitutional: Negative  HENT: Negative  Respiratory: Negative  Cardiovascular: Negative  Gastrointestinal: Negative  Musculoskeletal: + back soreness  Neurological: Negative  Psychiatric/Behavioral: Negative          Historical Information   Past Medical History:   Diagnosis Date    Cardiac disease     [facility]     Diabetes mellitus [facility]     Emphysema lung [facility]     History of fall     Hyperlipidemia     Hypertension     Pulmonary emphysema [facility]     Wound infection after surgery      Past Surgical History:   Procedure Laterality Date    APPENDECTOMY       SECTION      INCISION AND DRAINAGE POSTERIOR SPINE N/A 2017    Procedure: THORACIC WOUND EXPLORATION, WASHOUT, DEBRIDEMENT and CLOSURE;  Surgeon: Jennifer Simmons MD;  [facility]: [facility];  Service: Orthopedics    LUMBAR FUSION N/A 2017    Procedure: T3-T8 Posterior Spinal Fusion;  Surgeon: Jennifer Simmons MD;  Location[facility];  Service:      Social History   History   Alcohol Use No     History   Drug Use No     History   Smoking Status  Former Smoker   Smokeless Tobacco    Never Used     History reviewed  No pertinent family history      Meds/Allergies   current meds:  Current Facility-Administered Medications   Medication Dose Route Frequency    acetaminophen (TYLENOL) tablet 650 mg  650 mg Oral Q6H PRN    albuterol inhalation solution 2 5 mg  2 5 mg Nebulization Q6H PRN    atorvastatin (LIPITOR) tablet 80 mg  80 mg Oral Daily    carvedilol (COREG) tablet 3 125 mg  3 125 mg Oral BID With Meals    ceFAZolin (ANCEF) IVPB (premix) 2,000 mg  2,000 mg Intravenous Q8H    cholecalciferol (VITAMIN D3) tablet 1,000 Units  1,000 Units Oral Daily    docusate sodium (COLACE) capsule 100 mg  100 mg Oral BID    gabapentin (NEURONTIN) capsule 300 mg  300 mg Oral HS    insulin lispro (HumaLOG) 100 units/mL subcutaneous injection 1-5 Units  1-5 Units Subcutaneous TID AC    lactated ringers infusion  50 mL/hr Intravenous Continuous    lisinopril (ZESTRIL) tablet 2 5 mg  2 5 mg Oral HS    metFORMIN (GLUCOPHAGE) tablet 500 mg  500 mg Oral BID With Meals    methocarbamol (ROBAXIN) tablet 750 mg  750 mg Oral 4x Daily PRN    montelukast (SINGULAIR) tablet 10 mg  10 mg Oral HS    multivitamin stress formula tablet 1 tablet  1 tablet Oral Daily    ondansetron (ZOFRAN) injection 4 mg  4 mg Intravenous Q6H PRN    oxyCODONE (ROXICODONE) IR tablet 2 5 mg  2 5 mg Oral Q6H PRN    tiotropium (SPIRIVA) capsule for inhaler 18 mcg  18 mcg Inhalation PRN    traMADol (ULTRAM) tablet 50 mg  50 mg Oral Q6H [address]       PTA meds:   Prescriptions Prior to Admission   Medication    albuterol (2 5 mg/3 mL) 0 083 % nebulizer solution    atorvastatin (LIPITOR) 80 mg tablet    b complex vitamins tablet    carvedilol (COREG) 3 125 mg tablet    cholecalciferol (VITAMIN D3) 1,000 units tablet    Coenzyme Q10 (COQ10) 100 MG CAPS    gabapentin (NEURONTIN) 300 mg capsule    lisinopril (ZESTRIL) 2 5 mg tablet    metFORMIN (GLUCOPHAGE) 500 mg tablet    montelukast (SINGULAIR) 10 mg tablet    tiotropium (SPIRIVA) 18 mcg inhalation capsule     No Known Allergies    Objective   Vitals: Blood pressure 109/56, pulse 66, temperature 97 8 °F (36 6 °C), temperature source Oral, resp  rate 18, height 5' 1" (1 549 m), weight 50 8 kg (111 lb 15 9 oz), SpO2 99 %  Physical Exam   Constitutional: Pt is in NAD, nontoxic  HENT: nares patent, oropharynx negative for thrush  Head: Normocephalic  Eyes: EOM are normal  Pupils are equal, round, and reactive to light  Neck: Neck supple  Cardiovascular: Normal rate and regular rhythm  No murmur heard  Pulmonary/Chest: Breath sounds decreased bilaterally  No respiratory distress  Pt has no wheezes  Pt has no rales  Abdominal: Soft  Bowel sounds are normal  Pt exhibits no distension  There is no tenderness  There is no rebound and no guarding  Extremities: no edema  Neurological: Pt is alert and oriented to person, place, and time  Psychiatric: Pt has a normal mood and affect  Lab Results:                   Glucose (mg/dL)   Date Value   10/23/2017 119   04/17/2017 115   04/16/2017 105   04/14/2017 116       Labs reviewed    Imaging: reviewed  EKG, Pathology, and Other Studies: I have personally reviewed pertinent reports  VTE Prophylaxis: SCD's    Code Status: Full Code - Level 1    Assessment/Plan     1  Hx of T3-T8 spinal fusion with wound dehiscense/drainage: s/p I&D and debridement by orthopedics  Wound cultures obtained  Pain control and local wound care per ortho  Recommend IS hourly and bowel regimen to help prevent narcotic induced constipation  Labs in AM  2  Hypertension: Resume Coreg 3 125mg BID and Lisinopril 2 5mg daily  Hold for sbp < 130              3    Diabetes: continue Metformin 500mg BID and SSI  Monitor accuchecks              4    Mild NICM/diastolic heart failure: on Lasix 20mg as an outpatient  Currently on hold  Monitor volume status              5    COPD: continue chronic oxygen   Continue Spiriva and PRN albuterol  Monitor oxygen saturations              6    DVT prophylaxis: SCDs per ortho    Counseling / Coordination of Care  Total floor / unit time spent today 45 minutes  Greater than 50% of total time was spent with the patient and / or family counseling and / or coordination of care        Connie Joy PA-C

## 2017-11-28 NOTE — ANESTHESIA POSTPROCEDURE EVALUATION
Post-Op Assessment Note      CV Status:  Stable    Mental Status:  Alert and awake    Hydration Status:  Euvolemic    PONV Controlled:  Controlled    Airway Patency:  Patent  Airway: intubated    Post Op Vitals Reviewed: Yes          Staff: CRNA           BP   139/70   Temp (!) (P) 97 1 °F (36 2 °C) (11/28/17 1034)    Pulse (P) 73 (11/28/17 1034)   Resp      SpO2 (P) 99 % (11/28/17 1034)

## 2017-11-28 NOTE — RESPIRATORY THERAPY NOTE
RT Protocol Note  Joan Arias [de-identified] y o  female MRN: [id number]  Unit/Bed#: [facility] 902-01 Encounter: [id number]    Assessment    Active Problems:    * No active hospital problems  *      Home Pulmonary Medications:  Albuterol/nss Ashish ROBERT    Past Medical History:   Diagnosis Date    Cardiac disease     [facility]     Diabetes mellitus ([facility]     [facility]physema lung ([facility] )     History of fall     Hyperlipidemia     Hypertension     Pulmonary emphysema ([facility])     Wound infection after surgery      Social History     Social History    Marital status: /Civil Union     Spouse name: N/A    Number of children: N/A    Years of education: N/A     Social History Main Topics    Smoking status: Former Smoker    Smokeless tobacco: Never Used    Alcohol use No    Drug use: No    Sexual activity: Not Asked     Other Topics Concern    None     Social History Narrative    None       Subjective         Objective    Physical Exam:   Assessment Type: (P) Assess only  General Appearance: (P) Alert, Awake  Respiratory Pattern: (P) Normal  Chest Assessment: (P) Chest expansion symmetrical    Vitals:  Blood pressure 109/56, pulse 66, temperature 97 8 °F (36 6 °C), temperature source Oral, resp  rate 18, height 5' 1" (1 549 m), weight 50 8 kg (111 lb 15 9 oz), SpO2 99 %  Imaging and other studies: I have personally reviewed pertinent reports  Plan    Respiratory Plan: (P) Home Bronchodilator Patient pathway        Resp Comments: (P) Pt  alert no resp complaints at this time  Pt  uses UDN at home prn , ordered same here  No rx needed at this time pt  will call if needed

## 2017-11-28 NOTE — DISCHARGE INSTRUCTIONS
Discharge Instructions - Orthopedics  Alex Turner [de-identified] y o  female MRN: [id number]  Unit/Bed#: [facility] 18    Weight Bearing Status:                                           Weight bearing as tolerated both legs    DVT prophylaxis:  No chemical prophylaxis    Pain:  Continue analgesics as directed    Dressing Instructions:   Keep dressing clean, dry and intact until follow up appointment  Do not shower until 5 days after the procedure  PT/OT:  No PT until further directions at your first follow-up appointment  Appt Instructions: If you do not have your appointment, please call the clinic at [phone] to f/u with Dr Robert Holm in 1 week  Otherwise followup as scheduled below:    Contact the office sooner if you experience any increased numbness/tingling in the extremities        Miscellaneous:

## 2017-11-28 NOTE — PERIOPERATIVE NURSING NOTE
Pt assessment remains stable/ patient awake and alert/ resp even and non labored   Pt denies pain / no nausea/ no neuro deficits noted

## 2017-11-28 NOTE — ANESTHESIA PREPROCEDURE EVALUATION
Review of Systems/Medical History  Patient summary reviewed  Chart reviewed  No history of anesthetic complications     Cardiovascular  Exercise tolerance: poor,  Hyperlipidemia, Hypertension , CHF (chronic combined systolic/diastolic CHF) ,   Comment: ECHO 5/11/2017: EF 33%-45%   No RWMA  ,  Pulmonary  COPD severe- O2 dependent , ,   Comment: Optimized by pulmonology; mod-severe COPD, home 2 L O2 continuously  She reports breathing at baseline, sats 96% on O2, unlabored currently  Lungs clear, used her spiriva on schedule (has not switched to new inhaler ordered by pulmonologist)  Hasn't need albuterol in 2 days  High risk for GA as per pulmonologist      GI/Hepatic  Negative GI/hepatic ROS          Negative  ROS        Endo/Other  Diabetes well controlled Oral agent,      GYN       Hematology  Negative hematology ROS      Musculoskeletal    Comment: S/p thoracic fusion with chronic open incisional wound, for I&D      Neurology  Negative neurology ROS      Psychology   Negative psychology ROS          Physical Exam    Airway    Mallampati score: II  TM Distance: >3 FB  Neck ROM: full     Dental   upper dentures and lower dentures,     Cardiovascular  Cardiovascular exam normal    Pulmonary  Decreased breath sounds, No wheezes, No rales,     Other Findings      Lab Results   Component Value Date    WBC 5 71 11/15/2017    HGB 11 9 11/15/2017     11/15/2017     Lab Results   Component Value Date     (L) 11/15/2017    K 4 9 11/15/2017    BUN 17 11/15/2017    CREATININE 0 64 11/15/2017    GLUCOSE 119 10/23/2017     Lab Results   Component Value Date    INR 1 19 (H) 11/15/2017     BG 89 today    Anesthesia Plan  ASA Score- 3       Anesthesia Type- general with ASA Monitors  Additional Monitors:   Airway Plan: ETT      Comment: Discussed increased risk of pulmonary complications, small chance of post op ventilation given severe underlying COPD; however, pt has done well with prior anesthetics and is optimized  Given her open wound, pt feels she has no choice but to proceed          Induction- intravenous  Informed Consent- Anesthetic plan and risks discussed with patient and spouse  I personally reviewed this patient with the CRNA  Discussed and agreed on the Anesthesia Plan with the CRNA  Nancy Adams

## 2017-11-28 NOTE — OP NOTE
OPERATIVE REPORT  PATIENT NAME: Jose Brian    :  1935  MRN: [id number]  Pt Location: [facility] OR ROOM 18    SURGERY DATE: 2017    Surgeon(s) and Role:     * Christina Baird MD - Primary     * Velinda Barthel, MD     * Annemarie Paul PA-C    Preop Diagnosis:  Local infection of skin and subcutaneous tissue [L08 9]  Thoracic spine wound dehiscence     Post-Op Diagnosis Codes:     * Local infection of skin and subcutaneous tissue [L08 9]  Thoracic spine wound dehiscence    Procedure(s) (LRB):  I&D AND DEBRIDEMENT  of deep  of thoracic spine WOUND (N/A)    Specimen(s):  ID Type Source Tests Collected by Time Destination   1 : INTRASPINOUS SOFT TISSUE THORACIC Tissue Other TISSUE EXAM Christina Baird MD 2017 4330    A : INTRASPINOUS SOFT TISSUE THORACIC Tissue Other ANAEROBIC CULTURE AND GRAM STAIN, CULTURE, TISSUE AND GRAM STAIN Christina Baird MD 2017 6511        Estimated Blood Loss:   Minimal    Drains:       Anesthesia Type:   General    Operative Indications:  Local infection of skin and subcutaneous tissue [L08 9]  Thoracic spine wound dehiscence    Operative Findings:  2 mm dehiscence mid thoracic spine wound/draining sinus  No purulence  No deep collection  Devitalized wound edges of 3 cm in length in the midportion of the wound    Complications:   None    Procedure and Technique:  Irrigation debridement washout thoracic wound  Primary closure no drain  Patient was correctly identified by both the anesthesia department and myself  She was found to have a 2 mm area of wound dehiscence in the mid thoracic wound with minimal serous drainage  She was then taken to the operating room for the above-listed procedure  Her back was marked  In the operating room general anesthesia was induced in the supine position  Antibiotics were held  SCDs were attached to her legs    She was flipped prone onto radiolucent table ensuring that all bony prominences and neurovascular structures were adequately padded and protected  A time-out was performed  A 6 cm longitudinal incision was made utilizing the old incision  The superficial and deep subcutaneous layers were found to be clean without evidence of purulence or collection  Copious irrigation was performed to the wound after cultures and tissue pathology had been obtained  IV antibiotics were administered  A Leksell rongeur was used to perform mild debridement of the supraspinous ligaments and surrounding soft tissues  An area of skin which demonstrated mild devitalization was sharply debrided back with the use of the Bovie electric cautery roughly 1 mm on each side for length of 3 cm in the midportion of the wound  Debridement was done back to bleeding tissues  Hemostasis was achieved with the Bovie electric cautery  Copious irrigation was performed with normal saline solution mixed with neomycin  The wound was closed primarily with 0 Vicryl suture followed by 3 O nylon suture in the skin  Sterile dressings were applied to the wound  The patient was flipped into supine position, extubated and taken to recovery room in stable condition         I was present for the entire procedure    Patient Disposition:  PACU          SIGNATURE: Clarence Manzanares MD  DATE: November 28, 2017  TIME: 9:58 AM

## 2017-11-29 VITALS
WEIGHT: 111.99 LBS | DIASTOLIC BLOOD PRESSURE: 60 MMHG | BODY MASS INDEX: 21.14 KG/M2 | SYSTOLIC BLOOD PRESSURE: 117 MMHG | HEIGHT: 61 IN | RESPIRATION RATE: 20 BRPM | TEMPERATURE: 97.9 F | OXYGEN SATURATION: 100 % | HEART RATE: 70 BPM

## 2017-11-29 PROBLEM — L08.9 LOCAL INFECTION OF SKIN AND SUBCUTANEOUS TISSUE: Status: ACTIVE | Noted: 2017-11-29

## 2017-11-29 LAB
ANION GAP SERPL CALCULATED.3IONS-SCNC: 1 MMOL/L (ref 4–13)
BASOPHILS # BLD AUTO: 0.03 THOUSANDS/ΜL (ref 0–0.1)
BASOPHILS NFR BLD AUTO: 1 % (ref 0–1)
BUN SERPL-MCNC: 11 MG/DL (ref 5–25)
CALCIUM SERPL-MCNC: 8.2 MG/DL (ref 8.3–10.1)
CHLORIDE SERPL-SCNC: 99 MMOL/L (ref 100–108)
CO2 SERPL-SCNC: 34 MMOL/L (ref 21–32)
CREAT SERPL-MCNC: 0.52 MG/DL (ref 0.6–1.3)
EOSINOPHIL # BLD AUTO: 0.31 THOUSAND/ΜL (ref 0–0.61)
EOSINOPHIL NFR BLD AUTO: 6 % (ref 0–6)
ERYTHROCYTE [DISTWIDTH] IN BLOOD BY AUTOMATED COUNT: 14.3 % (ref 11.6–15.1)
GFR SERPL CREATININE-BSD FRML MDRD: 91 ML/MIN/1.73SQ M
GLUCOSE SERPL-MCNC: 100 MG/DL (ref 65–140)
GLUCOSE SERPL-MCNC: 82 MG/DL (ref 65–140)
GLUCOSE SERPL-MCNC: 90 MG/DL (ref 65–140)
GLUCOSE SERPL-MCNC: 97 MG/DL (ref 65–140)
HCT VFR BLD AUTO: 31.2 % (ref 34.8–46.1)
HGB BLD-MCNC: 10.4 G/DL (ref 11.5–15.4)
LYMPHOCYTES # BLD AUTO: 1.49 THOUSANDS/ΜL (ref 0.6–4.47)
LYMPHOCYTES NFR BLD AUTO: 28 % (ref 14–44)
MCH RBC QN AUTO: 30.9 PG (ref 26.8–34.3)
MCHC RBC AUTO-ENTMCNC: 33.3 G/DL (ref 31.4–37.4)
MCV RBC AUTO: 93 FL (ref 82–98)
MONOCYTES # BLD AUTO: 0.89 THOUSAND/ΜL (ref 0.17–1.22)
MONOCYTES NFR BLD AUTO: 17 % (ref 4–12)
NEUTROPHILS # BLD AUTO: 2.51 THOUSANDS/ΜL (ref 1.85–7.62)
NEUTS SEG NFR BLD AUTO: 48 % (ref 43–75)
NRBC BLD AUTO-RTO: 0 /100 WBCS
PLATELET # BLD AUTO: 188 THOUSANDS/UL (ref 149–390)
PMV BLD AUTO: 9.3 FL (ref 8.9–12.7)
POTASSIUM SERPL-SCNC: 4.3 MMOL/L (ref 3.5–5.3)
RBC # BLD AUTO: 3.37 MILLION/UL (ref 3.81–5.12)
SODIUM SERPL-SCNC: 134 MMOL/L (ref 136–145)
WBC # BLD AUTO: 5.24 THOUSAND/UL (ref 4.31–10.16)

## 2017-11-29 PROCEDURE — 80048 BASIC METABOLIC PNL TOTAL CA: CPT | Performed by: PHYSICIAN ASSISTANT

## 2017-11-29 PROCEDURE — 82948 REAGENT STRIP/BLOOD GLUCOSE: CPT

## 2017-11-29 PROCEDURE — G0008 ADMIN INFLUENZA VIRUS VAC: HCPCS | Performed by: ORTHOPAEDIC SURGERY

## 2017-11-29 PROCEDURE — G8979 MOBILITY GOAL STATUS: HCPCS

## 2017-11-29 PROCEDURE — G8989 SELF CARE D/C STATUS: HCPCS

## 2017-11-29 PROCEDURE — 90686 IIV4 VACC NO PRSV 0.5 ML IM: CPT | Performed by: ORTHOPAEDIC SURGERY

## 2017-11-29 PROCEDURE — 94760 N-INVAS EAR/PLS OXIMETRY 1: CPT

## 2017-11-29 PROCEDURE — 97165 OT EVAL LOW COMPLEX 30 MIN: CPT

## 2017-11-29 PROCEDURE — G8987 SELF CARE CURRENT STATUS: HCPCS

## 2017-11-29 PROCEDURE — G8988 SELF CARE GOAL STATUS: HCPCS

## 2017-11-29 PROCEDURE — 97163 PT EVAL HIGH COMPLEX 45 MIN: CPT

## 2017-11-29 PROCEDURE — 85025 COMPLETE CBC W/AUTO DIFF WBC: CPT | Performed by: PHYSICIAN ASSISTANT

## 2017-11-29 PROCEDURE — G8978 MOBILITY CURRENT STATUS: HCPCS

## 2017-11-29 RX ADMIN — CEFAZOLIN SODIUM 2000 MG: 2 SOLUTION INTRAVENOUS at 02:51

## 2017-11-29 RX ADMIN — METHOCARBAMOL 750 MG: 750 TABLET ORAL at 12:59

## 2017-11-29 RX ADMIN — TRAMADOL HYDROCHLORIDE 50 MG: 50 TABLET, FILM COATED ORAL at 05:48

## 2017-11-29 RX ADMIN — VITAMIN D, TAB 1000IU (100/BT) 1000 UNITS: 25 TAB at 08:34

## 2017-11-29 RX ADMIN — ATORVASTATIN CALCIUM 80 MG: 80 TABLET, FILM COATED ORAL at 08:34

## 2017-11-29 RX ADMIN — METHOCARBAMOL 750 MG: 750 TABLET ORAL at 08:34

## 2017-11-29 RX ADMIN — CARVEDILOL 3.12 MG: 3.12 TABLET, FILM COATED ORAL at 08:34

## 2017-11-29 RX ADMIN — DOCUSATE SODIUM 100 MG: 100 CAPSULE, LIQUID FILLED ORAL at 08:34

## 2017-11-29 RX ADMIN — TIOTROPIUM BROMIDE 18 MCG: 18 CAPSULE ORAL; RESPIRATORY (INHALATION) at 08:34

## 2017-11-29 RX ADMIN — ZINC 1 TABLET: TAB ORAL at 08:34

## 2017-11-29 RX ADMIN — TRAMADOL HYDROCHLORIDE 50 MG: 50 TABLET, FILM COATED ORAL at 00:29

## 2017-11-29 RX ADMIN — ACETAMINOPHEN 650 MG: 325 TABLET, FILM COATED ORAL at 08:34

## 2017-11-29 RX ADMIN — INFLUENZA VIRUS VACCINE 0.5 ML: 15; 15; 15; 15 SUSPENSION INTRAMUSCULAR at 15:33

## 2017-11-29 RX ADMIN — TRAMADOL HYDROCHLORIDE 50 MG: 50 TABLET, FILM COATED ORAL at 12:59

## 2017-11-29 RX ADMIN — METFORMIN HYDROCHLORIDE 500 MG: 500 TABLET, FILM COATED ORAL at 08:34

## 2017-11-29 NOTE — DISCHARGE SUMMARY
ORTHOPEDICS DISCHARGE SUMMARY  Dorian Del Rio [de-identified] y o  female MRN: [id number]  Unit/Bed#: [facility] 902-01    Attending Physician: Chary Lemos    Admitting diagnosis: Local infection of skin and subcutaneous tissue [L08 9]    Discharge diagnosis: Local infection of skin and subcutaneous tissue [L08 9]    Date of admission: 11/28/2017    Date of discharge: 11/29/17    Procedure: Incision and washout of thoracic wound with closure    HPI:  This is a [de-identified]y o  year old female that presented to the office with signs and symptoms of chronic draining thoracic wound   They tried and failed conservative treatment measures and wished to proceed with surgical intervention to washout and close wound  The risks, benefits, and complications of the procedure were discussed with the patient and informed consent was obtained  Hospital Course: The patient was admitted to the hospital on 11/28/2017 and underwent an uncomplicated incision, washout and closure of thoracic wound  They were transferred to the floor after a brief stay in the post-anesthesia care unit  Their pain was well managed with IV and oral pain medications  They began therapy on post operative day #1  Daily discussion was had with the patient, nursing staff, orthopaedic team, and family members if present  All questions were answered to the patients satisifaction  0  Lab Value Date/Time   HGB 10 4 (L) 11/29/2017 0438   HGB 11 9 11/15/2017 0844   HGB 12 4 10/23/2017 1128   HGB 11 6 06/06/2017 1112   HGB 8 7 (L) 04/16/2017 0515   HGB 8 9 (L) 04/15/2017 0520   HGB 9 8 (L) 04/14/2017 2230   HGB 12 6 04/13/2017 0724       Discharge Instructions: The patient was discharged weight bearing as tolerated to all extremities  Take pain medications as instructed  Discharge Medications: For the complete list of discharge medications, please refer to the patient's medication reconciliation

## 2017-11-29 NOTE — CASE MANAGEMENT
Initial Clinical Review    Age/Sex: [de-identified] y o  female    Surgery Date: 11/28/2017  Procedure: I&D AND DEBRIDEMENT  of deep  of thoracic spine WOUND (N/A)       Anesthesia: General    Admission Orders: Date/Time/Statement: 11/28/17 @ 0902     Orders Placed This Encounter   Procedures    Inpatient Admission     Standing Status:   Standing     Number of Occurrences:   1     Order Specific Question:   Admitting Physician     Answer:   India López [[id number]]     Order Specific Question:   Level of Care     Answer:   Med Surg [16]     Order Specific Question:   Estimated length of stay     Answer:   More than 2 Midnights     Order Specific Question:   Certification     Answer:   I certify that inpatient services are medically necessary for this patient for a duration of greater than two midnights  See H&P and MD Progress Notes for additional information about the patient's course of treatment  Vital Signs:   pre -op  11/28/17 0654  99 3 °F (37 4 °C)  74 hr  20 resp  125/70  96 %  None (Room air)    Post op -   11/28/17 1210  97 5 °F (36 4 °C)  67 hr  18 resp  127/58  100 %  Nasal cannula       Diet:        Diet Orders            Start     Ordered    11/28/17 1150  Diet Regular; Regular House  Diet effective now     Question Answer Comment   Diet Type Regular    Regular Regular House        11/28/17 1149    11/28/17 1150  Diet Regular; Regular House  Diet effective now     Question Answer Comment   Diet Type Regular    Regular Regular House    RD to adjust diet per protocol?  Yes        11/28/17 1149          Mobility: ambulate, weight bearing as tolerated    DVT Prophylaxis: SCD bilateral    Pain Control:   Pain Medications             gabapentin (NEURONTIN) 300 mg capsule Take 300 mg by mouth daily at bedtime    oxyCODONE (ROXICODONE) 5 mg immediate release tablet Take 0 5 tablets by mouth every 4 (four) hours as needed for moderate pain for up to 10 days Max Daily Amount: 15 mg        Scheduled Meds:  atorvastatin 80 mg Oral Daily   carvedilol 3 125 mg Oral BID With Meals   cholecalciferol 1,000 Units Oral Daily   docusate sodium 100 mg Oral BID   gabapentin 300 mg Oral HS   insulin lispro 1-5 Units Subcutaneous TID AC   lisinopril 2 5 mg Oral HS   metFORMIN 500 mg Oral BID With Meals   montelukast 10 mg Oral HS   multivitamin stress formula 1 tablet Oral Daily   tiotropium 18 mcg Inhalation Daily   traMADol 50 mg Oral Q6H [address]     Continuous Infusions:  lactated ringers 50 mL/hr Last Rate: Stopped (11/29/17 0743)     PRN Meds:   Acetaminophen PO x 1    albuterol    influenza vaccine    Methocarbamol PO x 1    ondansetron    oxyCODONE   Orders:  Consult medical management  ================================================================================================================================    Medical management consult 11/28/2017  Assessment/Plan   1  Hx of T3-T8 spinal fusion with wound dehiscense/drainage: s/p I&D and debridement by orthopedics  Wound cultures obtained  Pain control and local wound care per ortho  Recommend IS hourly and bowel regimen to help prevent narcotic induced constipation  Labs in AM  2  Hypertension: Resume Coreg 3 125mg BID and Lisinopril 2 5mg daily  Hold for sbp < 130              3    Diabetes: continue Metformin 500mg BID and SSI  Monitor accuchecks              4    Mild NICM/diastolic heart failure: on Lasix 20mg as an outpatient  Currently on hold  Monitor volume status              5    COPD: continue chronic oxygen  Continue Spiriva and PRN albuterol   Monitor oxygen saturations              6    DVT prophylaxis: SCDs per ortho

## 2017-11-29 NOTE — PROGRESS NOTES
Internal Medicine Progress Note  Patient: Meaghan Gutiérrez  Age/sex: [de-identified] y o  female  Medical Record #: [id number]      ASSESSMENT/PLAN:  Meaghan Gutiérrez is seen and examined and mangement for following issues:    1  Hx of T3-T8 spinal fusion with wound dehiscense/drainage: s/p I&D and debridement by orthopedics  Wound cultures obtained  Pain control and local wound care per ortho  Recommend IS hourly and bowel regimen to help prevent narcotic induced constipation  Labs reviewed  2  Hypertension: Resume Coreg 3 125mg BID and Lisinopril 2 5mg daily  Hold for sbp < 130              3    Diabetes: continue Metformin 500mg BID and SSI  Monitor accuchecks              4    Mild NICM/diastolic heart failure: on Lasix 20mg as an outpatient  Currently on hold  Monitor volume status              5    COPD: continue chronic oxygen  Continue Spiriva and PRN albuterol  Monitor oxygen saturations              6    DVT prophylaxis: SCDs per ortho   7  D/C planning: awaiting OT evaluation per CM; ok for d/c from medicine standpoint      Subjective: Patient seen and examined  New or overnight issues include pain is controlled w/meds      ROS:   GI: denies abdominal pain, change bowel habits or reflux symptoms  Neuro: No new neurologic changes; +lower back pain at incision site  Respiratory: No Cough, SOB  Cardiovascular: No CP, palpitations     Scheduled Meds:    atorvastatin 80 mg Oral Daily   carvedilol 3 125 mg Oral BID With Meals   cholecalciferol 1,000 Units Oral Daily   docusate sodium 100 mg Oral BID   gabapentin 300 mg Oral HS   insulin lispro 1-5 Units Subcutaneous TID AC   lisinopril 2 5 mg Oral HS   metFORMIN 500 mg Oral BID With Meals   montelukast 10 mg Oral HS   multivitamin stress formula 1 tablet Oral Daily   tiotropium 18 mcg Inhalation Daily   traMADol 50 mg Oral Q6H [address]       Labs:       Results from last 7 days  Lab Units 11/29/17  0438   WBC Thousand/uL 5 24   HEMOGLOBIN g/dL 10 4*   HEMATOCRIT % 31 2* PLATELETS Thousands/uL 188       Results from last 7 days  Lab Units 11/29/17  0438   SODIUM mmol/L 134*   POTASSIUM mmol/L 4 3   CHLORIDE mmol/L 99*   CO2 mmol/L 34*   BUN mg/dL 11   CREATININE mg/dL 0 52*   GLUCOSE RANDOM mg/dL 90   CALCIUM mg/dL 8 2*                Glucose (mg/dL)   Date Value   11/29/2017 90   10/23/2017 119   04/17/2017 115   04/16/2017 105       Labs reviewed    Physical Examination:  Vitals:   Vitals:    11/28/17 2228 11/29/17 0311 11/29/17 0729 11/29/17 0742   BP: 110/56 114/55  117/60   Pulse: 62 76  70   Resp: 18 20  20   Temp: 98 °F (36 7 °C) 98 °F (36 7 °C)  97 9 °F (36 6 °C)   TempSrc: Oral Oral  Oral   SpO2: 99% 98% 99% 100%   Weight:       Height:           GEN: NAD  HEENT: NC/AT, EOMI  RESP: CTAB, no R/R/W  CV: +S1 S2, regular rate, no rubs  ABD: soft, NT, ND, normal BS   EXT: Full ROM in all extremities; trace edema  Skin:  Dressing is intact  Neuro: Alert and Oriented x3      [x ] Total time spent: 30 Mins and greater than 50% of this time was spent counseling/coordinating care        Michael Hyman, Kiki Delgado   Internal Medicine

## 2017-11-29 NOTE — PHYSICAL THERAPY NOTE
PHYSICAL THERAPY EVALUATION      Patient Name: Ron Porter  Today's Date: 2017         Patient Active Problem List   Diagnosis    Fall    Physical deconditioning    Incontinence of urine in female    Pain    Pain in scapula    Pain in back    Local infection of skin and subcutaneous tissue       Past Medical History:   Diagnosis Date    Cardiac disease     [facility]     Diabetes mellitus [facility]     [facility]     History of fall     Hyperlipidemia     Hypertension     [facility]     Wound infection after surgery        Past Surgical History:   Procedure Laterality Date    APPENDECTOMY       SECTION      INCISION AND DRAINAGE POSTERIOR SPINE N/A 2017    Procedure: THORACIC WOUND EXPLORATION, WASHOUT, DEBRIDEMENT and CLOSURE;  Surgeon: Leeanne Salgado MD;  [facility];  Service: Orthopedics    LUMBAR FUSION N/A 2017    Procedure: T3-T8 Posterior Spinal Fusion;  Surgeon: Leeanne Salgado MD;  [facility];  Service:       17 0946   Note Type   Note type Eval only   Pain Assessment   Pain Assessment 0-10   Pain Score 5   Pain Type Acute pain;Surgical pain   Pain Location Back   Pain Orientation Mid;Upper   Home Living   Type of [address] Two level  (split level, 7 stairs at a time)   Prior Function   Level of [place] Independent with ADLs and functional mobility   Lives With Spouse   Receives Help From Family   ADL Assistance Independent   IADLs Independent   Falls in the last 6 months 0   Restrictions/Precautions   Other Precautions Fall Risk;O2;Pain   General   Additional Pertinent History POD 1 I&D, washout, closure of thoracic wound   Family/Caregiver Present No   Cognition   Overall Cognitive Status WFL   Arousal/Participation Cooperative   Orientation Level Oriented X4   Memory Within functional limits   Following Commands Follows multistep commands with increased time or repetition   Comments    RUE Assessment   RUE Assessment WFL   LUE Assessment   LUE Assessment WFL   RLE Assessment   RLE Assessment X   Strength RLE   RLE Overall Strength 4+/5   LLE Assessment   LLE Assessment X   Strength LLE   LLE Overall Strength 4+/5   Coordination   Sensation WFL   Light Touch   RLE Light Touch Grossly intact   LLE Light Touch Grossly intact   Bed Mobility   Supine to Sit 6  Modified independent   Additional items HOB elevated; Increased time required   Sit to Supine 6  Modified independent   Additional items HOB elevated; Increased time required   Transfers   Sit to Stand 7  Independent   Stand to Sit 7  Independent   Toilet transfer 6  Modified independent   Additional items Increased time required;Standard toilet   Ambulation/Elevation   Gait pattern Improper Weight shift; Short stride;Decreased foot clearance; Antalgic   Gait Assistance 5  Supervision   Additional items Assist x 1;Verbal cues   Assistive Device None   Distance 150 ft   Stair Management Assistance 5  Supervision   Additional items Assist x 1;Verbal cues; Increased time required   Stair Management Technique Foreward; One rail L;Reciprocal   Number of Stairs 7   Balance   Static Sitting Fair +   Dynamic Sitting Fair   Static Standing Fair   Dynamic Standing Fair -   Ambulatory Fair -   Endurance Deficit   Endurance Deficit Yes   Activity Tolerance   Activity Tolerance Patient limited by pain; Patient tolerated treatment well   Nurse Made Aware RN confirmed pt appropriate for PT eval    Assessment   Prognosis Good   Problem List Decreased strength;Decreased range of motion;Decreased endurance; Impaired balance;Decreased mobility;Pain;Decreased skin integrity   Assessment Pt is [de-identified] y o  female seen for PT evaluation s/p admit [facility] on 11/28/2017 w/ Local infection of skin and subcutaneous tissue  PT consulted to assess pt's functional mobility and d/c needs   Order placed for PT eval and tx, w/ activity as tolerated order  Comorbidities affecting pt's physical performance at time of assessment include: HLD, HTN, CHF, COPD with O2 dependence  diabetes  PTA, pt was independent w/ all functional mobility w/ no AD  Personal factors affecting pt at time of IE include: lives in multi (split level) story house and limited home support  Please find objective findings from PT assessment regarding body systems outlined above with impairments and limitations including gait deviations, pain, decreased activity tolerance, decreased functional mobility tolerance and decreased skin integrity  The following objective measures performed on IE also reveal limitations: Barthel Index: 85/100  Pt's clinical presentation is currently unstable/unpredictable seen in pt's presentation of pain, fall risk, active neuro checks and neurovascular check orders, abnormal labs  Pt currently mod I for bed mobility and transfers, supervision level for ambulation and 7 stairs  Pt to benefit from continued PT tx to address deficits as defined above and maximize level of functional independent mobility and consistency  From PT/mobility standpoint, recommendation at time of d/c would be home with family support pending progress in order to facilitate return to PLOF  Goals   Patient Goals to go home   STG Expiration Date 12/04/17   Short Term Goal #1 In 5 days: Pt will perform ambulation of 150+ft mod I with LRAD if needed  Pt will ascend/descend 7 stairs mod I  Treatment Day 0   Plan   Treatment/Interventions Functional transfer training;LE strengthening/ROM; Elevations; Therapeutic exercise; Endurance training;Patient/family training;Equipment eval/education; Bed mobility;Gait training;Spoke to nursing   PT Frequency 2-3x/wk   Recommendation   Recommendation Home with family support   PT - OK to Discharge Yes   Barthel Index   Feeding 10   Bathing 5   Grooming Score 5   Dressing Score 5   Bladder Score 10   Bowels Score 10   Toilet Use Score 10   Transfers (Bed/Chair) Score 15   Mobility (Level Surface) Score 10   Stairs Score 5   Barthel Index Score 85     Patricia Gist, PT

## 2017-11-29 NOTE — PROGRESS NOTES
Orthopedics   Tigist Adrian [de-identified] y o  female MRN: [id number]  Unit/Bed#: PPHP 902-01      Subjective:  [de-identified] y  o female post operative day 1 s/p I&D washout thoracic wound infection with closure  No fevers or chills overnight      Labs:    0  Lab Value Date/Time   HCT 31 2 (L) 11/29/2017 0438   HCT 35 6 11/15/2017 0844   HCT 37 1 10/23/2017 1128   HGB 10 4 (L) 11/29/2017 0438   HGB 11 9 11/15/2017 0844   HGB 12 4 10/23/2017 1128   INR 1 19 (H) 11/15/2017 0844   WBC 5 24 11/29/2017 0438   WBC 5 71 11/15/2017 0844   WBC 6 52 10/23/2017 1128   ESR 23 (H) 06/06/2017 1113   CRP 6 6 (H) 06/06/2017 1112       Meds:    Current Facility-Administered Medications:     acetaminophen (TYLENOL) tablet 650 mg, 650 mg, Oral, Q6H PRN, Rozina Cannon PA-C, 650 mg at 11/28/17 1554    albuterol inhalation solution 2 5 mg, 2 5 mg, Nebulization, Q6H PRN, Rozina Cannon PA-C    atorvastatin (LIPITOR) tablet 80 mg, 80 mg, Oral, Daily, Rozina Cannon PA-C, 80 mg at 11/28/17 1316    carvedilol (COREG) tablet 3 125 mg, 3 125 mg, Oral, BID With Meals, Jory Davis PA-C    cholecalciferol (VITAMIN D3) tablet 1,000 Units, 1,000 Units, Oral, Daily, Rozina Cannon PA-C, 1,000 Units at 11/28/17 1316    docusate sodium (COLACE) capsule 100 mg, 100 mg, Oral, BID, Rozina Cannon PA-C, 100 mg at 11/28/17 1746    gabapentin (NEURONTIN) capsule 300 mg, 300 mg, Oral, HS, Rozina Cannon PA-C, 300 mg at 11/28/17 2122    insulin lispro (HumaLOG) 100 units/mL subcutaneous injection 1-5 Units, 1-5 Units, Subcutaneous, TID AC **AND** Fingerstick Glucose (POCT), , , TID AC, Yovani Paulson MD    lactated ringers infusion, 50 mL/hr, Intravenous, Continuous, Jami Mckeon MD, Last Rate: 50 mL/hr at 11/28/17 0730    lisinopril (ZESTRIL) tablet 2 5 mg, 2 5 mg, Oral, HS, Jory Davis PA-C    metFORMIN (GLUCOPHAGE) tablet 500 mg, 500 mg, Oral, BID With Meals, Rozina Cannon PA-C, 500 mg at 11/28/17 1554    methocarbamol (ROBAXIN) tablet 750 mg, 750 mg, Oral, 4x Daily PRN, Rochelle Ayush, PA-C, 750 mg at 11/28/17 1316    montelukast (SINGULAIR) tablet 10 mg, 10 mg, Oral, HS, Rochelle Ayush, PA-C, 10 mg at 11/28/17 2122    multivitamin stress formula tablet 1 tablet, 1 tablet, Oral, Daily, Rochelle Ayush, PA-C, 1 tablet at 11/28/17 1554    ondansetron (ZOFRAN) injection 4 mg, 4 mg, Intravenous, Q6H PRN, Rochelle Ayush, PA-C    oxyCODONE (ROXICODONE) IR tablet 2 5 mg, 2 5 mg, Oral, Q6H PRN, Rochelle Ayush, PA-C    tiotropium [facility]) capsule for inhaler 18 mcg, 18 mcg, Inhalation, Daily, Rhett Kirkpatrick PA-C    traMADol Wyyocasta Salgado) tablet 50 mg, 50 mg, Oral, Q6H [address], Rochelle Ayush, PA-C, 50 mg at 11/29/17 7918    Blood Culture:   No results found for: BLOODCX    Wound Culture:   Lab Results   Component Value Date    WOUNDCULT No growth 06/20/2017       Ins and Outs:  I/O last 24 hours: In: 2511 7 [P O :560; I V :1951 7]  Out: 1350 [Urine:1350]          Physical Exam:  Vitals:    11/29/17 0311   BP: 114/55   Pulse: 76   Resp: 20   Temp: 98 °F (36 7 °C)   SpO2: 98%     Back:  mepilex dressing c/d/i  Motor intact hip flexion/extension, knee flexion/extension, plantarflexion/dorsiflexion, EHL/FHL  [place] S/SP/DP/S  Toes warm and well perfused    _*_*_*_*_*_*_*_*_*_*_*_*_*_*_*_*_*_*_*_*_*_*_*_*_*_*_*_*_*_*_*_*_*_*_*_*_*_*_*_*_*    Assessment: [de-identified] y  o female post operative day 1 I&D washout of thoracic wound infection    Doing well    Plan:  · WBAT all extremities  · Gram stain negative for bacteria  · Follow up intraoperative cultuers  · Ancef x24  · DVT prophylaxis mechanical  · Analgesics  · PT/OT  · Dispo: Ortho will follow  · Will continue to assess for acute blood loss anemia    Char Baeza

## 2017-11-29 NOTE — PLAN OF CARE
Problem: PHYSICAL THERAPY ADULT  Goal: Performs mobility at highest level of function for planned discharge setting  See evaluation for individualized goals  Treatment/Interventions: Functional transfer training, LE strengthening/ROM, Elevations, Therapeutic exercise, Endurance training, Patient/family training, Equipment eval/education, Bed mobility, Gait training, Spoke to nursing          See flowsheet documentation for full assessment, interventions and recommendations  Prognosis: Good  Problem List: Decreased strength, Decreased range of motion, Decreased endurance, Impaired balance, Decreased mobility, Pain, Decreased skin integrity  Assessment: Pt is [de-identified] y o  female seen for PT evaluation s/p admit [facility] on 11/28/2017 w/ Local infection of skin and subcutaneous tissue  PT consulted to assess pt's functional mobility and d/c needs  Order placed for PT eval and tx, w/ activity as tolerated order  Comorbidities affecting pt's physical performance at time of assessment include: HLD, HTN, CHF, COPD with O2 dependence  diabetes  PTA, pt was independent w/ all functional mobility w/ no AD  Personal factors affecting pt at time of IE include: lives in multi (split level) story house and limited home support  Please find objective findings from PT assessment regarding body systems outlined above with impairments and limitations including gait deviations, pain, decreased activity tolerance, decreased functional mobility tolerance and decreased skin integrity  The following objective measures performed on IE also reveal limitations: Barthel Index: 85/100  Pt's clinical presentation is currently unstable/unpredictable seen in pt's presentation of pain, fall risk, active neuro checks and neurovascular check orders, abnormal labs  Pt currently mod I for bed mobility and transfers, supervision level for ambulation and 7 stairs   Pt to benefit from continued PT tx to address deficits as defined above and maximize level of functional independent mobility and consistency  From PT/mobility standpoint, recommendation at time of d/c would be home with family support pending progress in order to facilitate return to PLOF  Recommendation: Home with family support     PT - OK to Discharge: Yes    See flowsheet documentation for full assessment

## 2017-11-29 NOTE — SOCIAL WORK
CM met with the Pt at bedside to discuss D/C plan  Pt states her son can provide transportation home  Pt wears O2 at home and has all equipment needed  No further needs identified at this time, RN aware

## 2017-11-29 NOTE — OCCUPATIONAL THERAPY NOTE
[address]  Evaluation     Patient Name: Dorian Del Rio  Today's Date: 2017  Problem List  Patient Active Problem List   Diagnosis    Fall    Physical deconditioning    Incontinence of urine in female    Pain    Pain in scapula    Pain in back    Local infection of skin and subcutaneous tissue     Past Medical History  Past Medical History:   Diagnosis Date    Cardiac disease     [facility]     [facility]     [facility] lung [facility]     History of fall     Hyperlipidemia     Hypertension     [facility]     Wound infection after surgery      Past Surgical History  Past Surgical History:   Procedure Laterality Date    APPENDECTOMY       SECTION      INCISION AND DRAINAGE POSTERIOR SPINE N/A 2017    Procedure: THORACIC WOUND EXPLORATION, WASHOUT, DEBRIDEMENT and CLOSURE;  Surgeon: Billy Willams MD;  [facility];  Service: Orthopedics    LUMBAR FUSION N/A 2017    Procedure: T3-T8 Posterior Spinal Fusion;  Surgeon: Billy Willams MD;  [facility];  Service:    Diana PATTERSON I&D, POST SPINE, CERV/THOR/CERV-THOR N/A 2017    Procedure: I&D AND DEBRIDEMENT  of deep  of thoracic spine WOUND;  Surgeon: Billy Willams MD;  [facility];  Service: Orthopedics         17 1401   Note Type   Note type Eval only   Restrictions/Precautions   Weight Bearing Precautions Per Order Yes   RUE Weight Bearing Per Order WBAT   LUE Weight Bearing Per Order WBAT   RLE Weight Bearing Per Order WBAT   LLE Weight Bearing Per Order WBAT   Other Precautions O2;Fall Risk   Pain Assessment   Pain Assessment No/denies pain   Pain Score No Pain   Home Living   Type of Home House   Home Layout Two level;Stairs to enter with rails   Bathroom Shower/Tub Tub/shower unit   Bathroom Toilet Standard   Bathroom Equipment Shower chair;Commode   Bathroom Accessibility Accessible   Additional Comments Pt lives with her  in a 2 story home     Prior Function   Level of [place] Independent with ADLs and functional mobility   Lives With Spouse   Receives Help From Family   ADL Assistance Independent   IADLs Independent   Falls in the last 6 months 0   Vocational Retired   Comments Pt was I w/  ADLS and IADLS, drove, & required no use of DME PTA   Lifestyle   Autonomy Pt was I w/ ADLS/IADLS PTA    Reciprocal Relationships Pt lives w/ her  who is home with her all the time    Psychosocial   Psychosocial (WDL) WDL   Subjective   Subjective "I am ready to go, I am fine"   ADL   Eating Assistance 7  Independent   Grooming Assistance 7  Independent   UB Bathing Assistance 5  Supervision/Setup   LB Bathing Assistance 5  Supervision/Setup   UB Dressing Assistance 7  Independent    [address] 5  Supervision/Setup  (using cross over technique in seated position )   Toileting Assistance  5  Supervision/Setup   Functional Assistance 5  Supervision/Setup   Bed Mobility   Supine to Sit 6  Modified independent   Additional items HOB elevated   Sit to Supine 6  Modified independent   Additional items HOB elevated   Transfers   Sit to Stand 6  Modified independent   Additional items Increased time required   Stand to Sit 6  Modified independent   Additional items Increased time required   Toilet transfer 6  Modified independent   Additional items Increased time required;Standard toilet   Functional Mobility   Functional Mobility 5  Supervision   Additional Comments without AD   Balance   Static Sitting Good   Dynamic Sitting Fair +   Static Standing Fair   Dynamic Standing Fair -   Ambulatory Fair -   Activity Tolerance   Activity Tolerance Patient tolerated treatment well   Medical Staff Made Aware CM updated   Nurse Made Aware RN Tobin confirm pt appropriate for OT eval    RUE Assessment   RUE Assessment WFL   LUE Assessment   LUE Assessment WFL   Hand Function   Gross Motor Coordination Functional   Fine Motor Coordination Functional   Cognition   Overall Cognitive Status WFL   Arousal/Participation Alert; Responsive; Cooperative   Attention Within functional limits   Orientation Level Oriented X4   Memory Within functional limits   Following Commands Follows all commands and directions without difficulty   Comments Pt is alert and oriented x4  Pt pleasant and cooperative t/o therapy session  Pt able to express basic and complex needs  Pt demontrates safety awareness of O2 line during in room mobility and able to manage line safely  Assessment   Limitation Decreased endurance;Decreased self-care trans;Decreased high-level ADLs   Prognosis Good   Assessment Pt is an [de-identified] y/o female seen for OT eval s/p adm to [facility] w/ Local infection of skin and subcutaneous tissue and Thoracic spine wound dehiscence receiving I&D AND DEBRIDEMENT  of deep  of thoracic spine WOUND on 11/28/2017  Pt had a lumbar fusion T3-T8 ion 4/14/2017  Comorbidities include a h/o COPD w/ chronic hypoxic respiratory failure on 2L O2 at home, DM, HTN, NICM, chronic diastolic heart failure, and pulmonary emphysema  Pt with active OT orders and activity as tolerated orders  Pt lives with her  in a 2 story home  Pt was I w/  ADLS and IADLS, drove, & required no use of DME PTA  Pt is currently performing ADLs w/ mod I/S, functional transfers w/ mod I and functional mobility without AD w/ S  Pt scored overall 85/100 on the Barthel Index and 1 on the Modified [place] Scale  Based on the aforementioned OT evaluation, functional performance deficits, and assessments, pt has been identified as a low complexity evaluation  Anticipate pt will be able to return home with increased family support once medically stable  Recommend perform bathing with supervision for safety  Educated pt regarding safety in the home, energy conservation and activity engagement  Pt with no concerns for D/C and all questions answered at this time   Pt no longer requires acute care skilled OT services  Recommend pt participate in ADLs and ambulate hallways with non-OT staff  Please re-consult if changes occur  D/C OT      Goals   Patient Goals to go home   Recommendation   OT Discharge Recommendation Home with family support   OT - OK to Discharge Yes   Barthel Index   Feeding 10   Bathing 5   Grooming Score 5   Dressing Score 10   Bladder Score 10   Bowels Score 10   Toilet Use Score 10   Transfers (Bed/Chair) Score 15   Mobility (Level Surface) Score 10   Stairs Score 0   Barthel Index Score 85   Modified [place] Scale   Modified [place] Scale 1     Allison Palacio MS, OTR/L

## 2017-11-30 LAB
ABO GROUP BLD BPU: NORMAL
ABO GROUP BLD BPU: NORMAL
BPU ID: NORMAL
BPU ID: NORMAL
CROSSMATCH: NORMAL
CROSSMATCH: NORMAL
UNIT DISPENSE STATUS: NORMAL
UNIT DISPENSE STATUS: NORMAL
UNIT PRODUCT CODE: NORMAL
UNIT PRODUCT CODE: NORMAL
UNIT RH: NORMAL
UNIT RH: NORMAL

## 2017-12-01 LAB
BACTERIA SPEC ANAEROBE CULT: NO GROWTH
BACTERIA TISS AEROBE CULT: NO GROWTH
GRAM STN SPEC: NORMAL

## 2017-12-04 ENCOUNTER — GENERIC CONVERSION - ENCOUNTER (OUTPATIENT)
Dept: OTHER | Facility: OTHER | Age: 82
End: 2017-12-04

## 2017-12-13 ENCOUNTER — GENERIC CONVERSION - ENCOUNTER (OUTPATIENT)
Dept: OTHER | Facility: OTHER | Age: 82
End: 2017-12-13

## 2018-01-10 ENCOUNTER — APPOINTMENT (OUTPATIENT)
Dept: RADIOLOGY | Facility: CLINIC | Age: 83
End: 2018-01-10
Payer: MEDICARE

## 2018-01-10 ENCOUNTER — GENERIC CONVERSION - ENCOUNTER (OUTPATIENT)
Dept: OTHER | Facility: OTHER | Age: 83
End: 2018-01-10

## 2018-01-10 DIAGNOSIS — S22.058D: ICD-10-CM

## 2018-01-10 DIAGNOSIS — S22.080D WEDGE COMPRESSION FRACTURE OF T11-T12 VERTEBRA, SUBSEQUENT ENCOUNTER FOR FRACTURE WITH ROUTINE HEALING: ICD-10-CM

## 2018-01-10 PROCEDURE — 72070 X-RAY EXAM THORAC SPINE 2VWS: CPT
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
## 2018-01-10 NOTE — RESULT NOTES
Verified Results  (1) SED RATE [id number] 11:13AM Josseline Rivera     Test Name Result Flag Reference   SED RATE 23 mm/hour H 0-20     (1) C-REACTIVE PROTEIN [id number] 11:12AM Soren Nouveaux Richejohana Order Number: [id number]     Test Name Result Flag Reference   C-REACT PROTEIN 6 6 mg/L H <3 0     (1) CBC/PLT/DIFF [id number] 11:12AM Soren Nouveaux Richejohana Order Number: [id number]     Test Name Result Flag Reference   WBC COUNT 10 46 Thousand/uL H 4 31-10 16   RBC COUNT 4 00 Million/uL  3 81-5 12   HEMOGLOBIN 11 6 g/dL  11 5-15 4   HEMATOCRIT 36 0 %  34 8-46  1   MCV 90 fL  82-98   MCH 29 0 pg  26 8-34 3   MCHC 32 2 g/dL  31 4-37 4   RDW 14 8 %  11 6-15 1   MPV 8 8 fL L 8 9-12 7   PLATELET COUNT 906 Thousands/uL H 149-390   nRBC AUTOMATED 0 /100 WBCs     NEUTROPHILS RELATIVE PERCENT 69 %  43-75   LYMPHOCYTES RELATIVE PERCENT 19 %  14-44   MONOCYTES RELATIVE PERCENT 11 %  4-12   EOSINOPHILS RELATIVE PERCENT 1 %  0-6   BASOPHILS RELATIVE PERCENT 0 %  0-1   NEUTROPHILS ABSOLUTE COUNT 7 17 Thousands/? ??L  1 85-7 62   LYMPHOCYTES ABSOLUTE COUNT 1 94 Thousands/? ??L  0 60-4 47   MONOCYTES ABSOLUTE COUNT 1 16 Thousand/? ??L  0 17-1 22   EOSINOPHILS ABSOLUTE COUNT 0 13 Thousand/? ??L  0 00-0 61   BASOPHILS ABSOLUTE COUNT 0 03 Thousands/? ??L  0 00-0 10

## 2018-01-12 ENCOUNTER — GENERIC CONVERSION - ENCOUNTER (OUTPATIENT)
Dept: OTHER | Facility: OTHER | Age: 83
End: 2018-01-12

## 2018-01-12 VITALS
RESPIRATION RATE: 16 BRPM | DIASTOLIC BLOOD PRESSURE: 60 MMHG | TEMPERATURE: 97.4 F | HEART RATE: 76 BPM | SYSTOLIC BLOOD PRESSURE: 108 MMHG

## 2018-01-12 VITALS
WEIGHT: 117 LBS | DIASTOLIC BLOOD PRESSURE: 73 MMHG | HEART RATE: 82 BPM | SYSTOLIC BLOOD PRESSURE: 123 MMHG | BODY MASS INDEX: 21.4 KG/M2

## 2018-01-12 VITALS
DIASTOLIC BLOOD PRESSURE: 77 MMHG | WEIGHT: 120 LBS | SYSTOLIC BLOOD PRESSURE: 121 MMHG | BODY MASS INDEX: 21.95 KG/M2 | HEART RATE: 91 BPM

## 2018-01-12 NOTE — MISCELLANEOUS
Message  To whom it May concern    This patient is status post thoracic fusion,  And left scapular fracture, closed treatment  She is now upgraded from nonweightbearing to weightbearing as tolerance,  Left upper extremity and may use a rolling, ambulatory walker      Thank you      Signatures   Electronically signed by : Regi Styles, [facility]; May  8 2017  3:49PM EST                       (Author)

## 2018-01-13 VITALS
HEART RATE: 108 BPM | WEIGHT: 121.5 LBS | SYSTOLIC BLOOD PRESSURE: 163 MMHG | DIASTOLIC BLOOD PRESSURE: 98 MMHG | HEIGHT: 62 IN | BODY MASS INDEX: 22.36 KG/M2

## 2018-01-13 VITALS
BODY MASS INDEX: 21.22 KG/M2 | DIASTOLIC BLOOD PRESSURE: 80 MMHG | HEART RATE: 89 BPM | WEIGHT: 116 LBS | SYSTOLIC BLOOD PRESSURE: 125 MMHG

## 2018-01-14 VITALS
WEIGHT: 117 LBS | BODY MASS INDEX: 21.4 KG/M2 | DIASTOLIC BLOOD PRESSURE: 78 MMHG | SYSTOLIC BLOOD PRESSURE: 121 MMHG | HEART RATE: 80 BPM

## 2018-01-15 VITALS
DIASTOLIC BLOOD PRESSURE: 80 MMHG | SYSTOLIC BLOOD PRESSURE: 138 MMHG | HEART RATE: 98 BPM | HEIGHT: 62 IN | WEIGHT: 125.13 LBS | BODY MASS INDEX: 23.03 KG/M2

## 2018-01-22 VITALS
DIASTOLIC BLOOD PRESSURE: 95 MMHG | SYSTOLIC BLOOD PRESSURE: 150 MMHG | BODY MASS INDEX: 20.67 KG/M2 | HEART RATE: 83 BPM | WEIGHT: 113 LBS

## 2018-01-22 VITALS
SYSTOLIC BLOOD PRESSURE: 135 MMHG | HEART RATE: 80 BPM | BODY MASS INDEX: 20.8 KG/M2 | DIASTOLIC BLOOD PRESSURE: 83 MMHG | WEIGHT: 113 LBS | HEIGHT: 62 IN

## 2018-01-22 VITALS
DIASTOLIC BLOOD PRESSURE: 78 MMHG | SYSTOLIC BLOOD PRESSURE: 125 MMHG | BODY MASS INDEX: 20.67 KG/M2 | WEIGHT: 113 LBS | HEART RATE: 78 BPM

## 2018-01-23 NOTE — PROGRESS NOTES
Preliminary Nursing Report           Patient Information   Initial Encounter Entry Date:   2018 3:07 PM EST (Automated Transmission Automated Transmission)     Last Modified:   {Vonda Silva}          Legal Name: [address] Number:      YOB: 1935      Age (years): [de-identified]      Gender: F      Body Mass Index (BMI): 21 kg/m2      Height: 62 in  Weight: 113 lbs (51 kgs)        Address:   [facility]           Phone: [phone]   (consent to leave messages)      Email:      Ethnicity: Decline to State      Anglican:      Marital Status:      Preferred Language: English      Race: Other Race              Patient Insurance Information      Primary Insurance InformationCarrier Name: {Primary  CarrierName}        Carrier Address:   {Primary  CarrierAddress}           Carrier Phone: {Primary  CarrierPhone}        Group Number: {Primary  GroupNumber}        Policy Number: {Primary  PolicyNumber}        Insured Name: {Primary  InsuredName}        Insured : {Primary  InsuredDOB}        Relationship to Insured: {Primary  RelationshiptoInsured}          Secondary Insurance InformationCarrier Name: {Secondary  CarrierName}        Carrier Address:   {Secondary  CarrierAddress}           Carrier Phone: {Secondary  CarrierPhone}        Group Number: {Secondary  GroupNumber}        Policy Number: {Secondary  PolicyNumber}        Insured Name: {Secondary  InsuredName}        Insured : {Secondary  InsuredDOB}        Relationship to Insured: {Secondary  RelationshiptoInsured}                Health Profile   Booking #:   Carol Esha #: [id number]           DOS: 2018    Surgery : CLEANSING OF SKIN/TISSUE    Add'l Procedures/Notes:      Surgery Risk: Minor       Precautions        Allergies   No Known Drug Allergies          Medications   Acetaminophen 325 MG Oral Tablet     Albuterol Sulfate (2 5 MG/3ML) 0 083% Inhalation Nebulization Solution     Aspirin 81 MG TABS     Calcium Carbonate-Vitamin D 600-400 MG-UNIT Oral Tablet     Coreg 3 125 MG Oral Tablet     Gabapentin 300 MG Oral Capsule     Lasix 20 MG Oral Tablet     Lipitor 80 MG Oral Tablet     Lisinopril 2 5 MG Oral Tablet     MetFORMIN HCl - 500 MG Oral Tablet     Methocarbamol 500 MG Oral Tablet     Methocarbamol 750 MG Oral Tablet     OxyCODONE HCl - 5 MG Oral Tablet     Singulair 10 MG Oral Tablet     Spiriva HandiHaler 18 MCG Inhalation Capsule     Sulfamethoxazole-Trimethoprim 800-160 MG Oral Tablet            Conditions   Aftercare following surgery of the musculoskeletal system     Closed wedge compression fracture of eleventh thoracic vertebra with routine healing, subsequent encounter     Displaced fracture of body of scapula, left shoulder, subsequent encounter for fracture with routine healing     History of incision and drainage     Laceration of ear, left, initial encounter     Other closed fracture of fifth thoracic vertebra with routine healing, subsequent encounter     Physical deconditioning     Skin infection            Family History   None          Surgical History   None          Social History   Former smoker     No alcohol use                       Patient Instructions     Medical Procedure Risk  NPO Instructions   The day before surgery it is recommended to have a light dinner at your usual time and you are allowed a light snack  early in the evening  Do not eat anything heavy or eat a big meal after 7pm  Do not eat or drink anything after midnight prior to your surgery  If you are supposed to take any of your medications, do so with a sip of water  Failure to follow these instructions  can lead to an increased risk of lung complications and may result in a delay or cancellation of your procedure  If you have any questions, contact your institution for further instructions   No candy, no gum, no mints, no chewing tobacco        Aspirin 81 MG TABS  Medication Instruction (Aspirin - Blood Thinners)  112, 104,  105, 114,  113, 103,  110, 107,  108, 109,  111, 106  Please continue to take this medication on your normal schedule  If this is an  oral medication and you take in the morning, you may do so with a sip of water  However, your surgeon may have you stop aspirin up to a week early if you are having intracranial, middle ear, posterior eye, spine surgery or prostate surgery  Coreg 3 125 MG Oral Tablet  Medication Instruction (Beta Blocker)  3, 2,  c, 4,  6, 5  Please continue to take this medication on your normal schedule  If this is an oral  medication and you take in the morning, you may do so with a sip of water  Spiriva HandiHaler 18 MCG Inhalation Capsule, Albuterol Sulfate (2 5 MG/3ML) 0 083% Inhalation Nebulization Solution  Medication Instruction (Bronchodilator) 18  Please continue the following medications up to and including the day of surgery  Please bring this medication with you on  the day of surgery  Lasix 20 MG Oral Tablet  Medication Instruction (Diuretics - Water Pills)  28, 29  Please continue the following medications up to the evening before surgery, but do  not take it on the day of surgery  Singulair 10 MG Oral Tablet  Medication Instruction (Leukotriene Inhibitors - Asthma Medication)  48  Please continue to take this medication on your normal schedule  If this is an oral medication and you take in the morning, you may do so with a sip of water  Gabapentin 300 MG Oral Capsule  Medication Instruction (Neurological Medication)  8  Please continue to take this medication on your normal schedule  If this is an oral medication and you take in the morning, you may do so with a sip of water  OxyCODONE HCl - 5 MG Oral Tablet  Medication Instruction (Opioids - Pain Medication)  62  Please continue the following medications, if needed, up to and including the day of surgery (with a sip of water)         Lipitor 80 MG Oral Tablet  Medication Instruction (Cholesterol Medication)  81, 82  Please continue to take this medication on your normal schedule  If this is an oral  medication and you take in the morning, you may do so with a sip of water  Testing Considerations     No recommendations for this classification  Consultations     No recommendations for this classification  Miscellaneous Questions      Question: Are you able to walk up a flight of stairs, walk up a hill or do heavy housework WITHOUT having chest pain or shortness of breath? Answer: YES             Allergies/Conditions/Medications Not Found      The following were not recognized by our system when generating the recommendations  Please consider if this would impact any preoperative protocols  No alcohol use      Physical deconditioning      Acetaminophen 325 MG Oral Tablet      Lisinopril 2 5 MG Oral Tablet      MetFORMIN HCl - 500 MG Oral Tablet             Appointment Information      Date:    01/26/2018      Location:    [place]      Address:         Directions:                 Footnotes revision 14     Denotes a free-text entry  Legal Disclaimer: Any and all recommendations and services provided herein are designed to assist in the preoperative care of the patient  Nothing contained herein is designed to replace,  eliminate or alleviate the responsibility of the attending physician to supervise and determine the patients preoperative care and course of treatment  Failure to provide complete, accurate information may negatively impact the system s ability to recommend the proper preoperative protocol  THE ATTENDING PHYSICIAN IS RESPONSIBLE TO REVIEW THE SUGGESTED PREOPERATIVE PROTOCOLS/COURSE OF TREATMENT AND PRESCRIBE THE FINAL COURSE OF PREOPERATIVE TREATMENT IN CONSULTATION  WITH THE PATIENT       THE [employer] SYSTEM AND ITS MATERIALS ARE PROVIDED AS IS WITHOUT WARRANTY OF ANY KIND, EXPRESS OR IMPLIED, INCLUDING, BUT NOT LIMITED TO, WARRANTIES OF PERFORMANCE OR MERCHANTABILITY OR FITNESS FOR  A PARTICULAR PURPOSE  PATIENT AND PHYSICIANS HEREBY AGREE THAT THEIR USE OF THE MATERIALS AND RESOURCES ACT AS A CONSENT TO RELEASE AND WAIVE ePREOP FROM ANY AND ALL CLAIMS OF WARRANTY, TORT OR CONTRACT LAW OF ANY KIND  Electronically signed by:Ashley ODELL    Jan 17 2018  2:45PM EST

## 2018-01-24 VITALS
HEART RATE: 80 BPM | BODY MASS INDEX: 21.35 KG/M2 | DIASTOLIC BLOOD PRESSURE: 68 MMHG | SYSTOLIC BLOOD PRESSURE: 127 MMHG | WEIGHT: 113 LBS

## 2018-01-24 VITALS
BODY MASS INDEX: 20.8 KG/M2 | HEIGHT: 62 IN | SYSTOLIC BLOOD PRESSURE: 166 MMHG | HEART RATE: 73 BPM | DIASTOLIC BLOOD PRESSURE: 82 MMHG | WEIGHT: 113 LBS

## 2018-01-24 VITALS
DIASTOLIC BLOOD PRESSURE: 69 MMHG | BODY MASS INDEX: 21.35 KG/M2 | WEIGHT: 113 LBS | HEART RATE: 74 BPM | SYSTOLIC BLOOD PRESSURE: 137 MMHG

## 2018-01-29 ENCOUNTER — APPOINTMENT (OUTPATIENT)
Dept: WOUND CARE | Facility: HOSPITAL | Age: 83
End: 2018-01-29
Payer: MEDICARE

## 2018-01-29 PROCEDURE — 11042 DBRDMT SUBQ TIS 1ST 20SQCM/<: CPT

## 2018-01-29 PROCEDURE — 99213 OFFICE O/P EST LOW 20 MIN: CPT

## 2018-02-19 ENCOUNTER — APPOINTMENT (OUTPATIENT)
Dept: WOUND CARE | Facility: HOSPITAL | Age: 83
End: 2018-02-19
Payer: MEDICARE

## 2018-02-19 PROCEDURE — 97597 DBRDMT OPN WND 1ST 20 CM/<: CPT

## 2018-02-27 DIAGNOSIS — M54.10 RADICULAR PAIN: Primary | ICD-10-CM

## 2018-02-27 RX ORDER — GABAPENTIN 300 MG/1
300 CAPSULE ORAL
Qty: 60 CAPSULE | Refills: 0 | Status: SHIPPED | OUTPATIENT
Start: 2018-02-27 | End: 2019-12-04 | Stop reason: ALTCHOICE

## 2018-03-12 ENCOUNTER — APPOINTMENT (OUTPATIENT)
Dept: WOUND CARE | Facility: HOSPITAL | Age: 83
End: 2018-03-12
Payer: MEDICARE

## 2018-03-12 PROCEDURE — 11042 DBRDMT SUBQ TIS 1ST 20SQCM/<: CPT

## 2018-03-26 ENCOUNTER — APPOINTMENT (OUTPATIENT)
Dept: WOUND CARE | Facility: HOSPITAL | Age: 83
End: 2018-03-26
Payer: MEDICARE

## 2018-03-26 PROCEDURE — 97597 DBRDMT OPN WND 1ST 20 CM/<: CPT

## 2018-04-16 ENCOUNTER — APPOINTMENT (OUTPATIENT)
Dept: WOUND CARE | Facility: HOSPITAL | Age: 83
End: 2018-04-16
Payer: MEDICARE

## 2018-04-16 PROCEDURE — 11042 DBRDMT SUBQ TIS 1ST 20SQCM/<: CPT

## 2018-05-07 ENCOUNTER — APPOINTMENT (OUTPATIENT)
Dept: WOUND CARE | Facility: HOSPITAL | Age: 83
End: 2018-05-07
Payer: MEDICARE

## 2018-05-07 PROCEDURE — 99212 OFFICE O/P EST SF 10 MIN: CPT

## 2018-05-18 ENCOUNTER — OFFICE VISIT (OUTPATIENT)
Dept: FAMILY MEDICINE CLINIC | Facility: CLINIC | Age: 83
End: 2018-05-18
Payer: MEDICARE

## 2018-05-18 VITALS
SYSTOLIC BLOOD PRESSURE: 100 MMHG | HEIGHT: 61 IN | DIASTOLIC BLOOD PRESSURE: 60 MMHG | BODY MASS INDEX: 21.67 KG/M2 | WEIGHT: 114.8 LBS | HEART RATE: 73 BPM | OXYGEN SATURATION: 98 % | TEMPERATURE: 98.3 F

## 2018-05-18 DIAGNOSIS — D64.9 ANEMIA, UNSPECIFIED TYPE: ICD-10-CM

## 2018-05-18 DIAGNOSIS — E11.9 TYPE 2 DIABETES MELLITUS TREATED WITHOUT INSULIN (HCC): ICD-10-CM

## 2018-05-18 DIAGNOSIS — J43.9 PULMONARY EMPHYSEMA, UNSPECIFIED EMPHYSEMA TYPE (HCC): ICD-10-CM

## 2018-05-18 DIAGNOSIS — I51.9 CHRONIC SYSTOLIC DYSFUNCTION OF LEFT VENTRICLE: ICD-10-CM

## 2018-05-18 DIAGNOSIS — I21.4 NSTEMI (NON-ST ELEVATED MYOCARDIAL INFARCTION) (HCC): Primary | ICD-10-CM

## 2018-05-18 DIAGNOSIS — Z13.820 SCREENING FOR OSTEOPOROSIS: ICD-10-CM

## 2018-05-18 DIAGNOSIS — R79.89 ABNORMAL TSH: ICD-10-CM

## 2018-05-18 LAB
EST. AVERAGE GLUCOSE BLD GHB EST-MCNC: 111 MG/DL
HBA1C MFR BLD HPLC: 5.5 %

## 2018-05-18 PROCEDURE — 99204 OFFICE O/P NEW MOD 45 MIN: CPT | Performed by: FAMILY MEDICINE

## 2018-05-18 RX ORDER — LEVALBUTEROL INHALATION SOLUTION 0.63 MG/3ML
0.63 SOLUTION RESPIRATORY (INHALATION) EVERY 4 HOURS PRN
COMMUNITY

## 2018-05-18 RX ORDER — ASPIRIN 81 MG/1
81 TABLET, CHEWABLE ORAL
COMMUNITY

## 2018-05-18 RX ORDER — FUROSEMIDE 20 MG/1
1 TABLET ORAL 2 TIMES DAILY
COMMUNITY
End: 2020-07-27 | Stop reason: SDUPTHER

## 2018-05-18 NOTE — PROGRESS NOTES
Assessment/Plan:    No problem-specific Assessment & Plan notes found for this encounter  Diagnoses and all orders for this visit:    NSTEMI (non-ST elevated myocardial infarction) ([facility] )  Stable no acute decompensation advised to follow up with cardiologist and continue same medications at this time  Type 2 diabetes mellitus treated without insulin (HCC)  Stable advise to continue measure her blood glucose daily and daily metformin  Abnormal TSH  No symptoms normal T4 advise to call or make an appt if symptoms develop  Chronic systolic dysfunction of left ventricle  Stable no acute decompensation  Pulmonary emphysema, unspecified emphysema type ([facility] )  advise to continue breathing treatements and daily oxygen and follow up with pulmonologist     Anemia, unspecified type  Will investigate    Screening for osteoporosis  -     DXA bone density spine hip and pelvis; Future    Other orders  -     aspirin 81 mg chewable tablet; Chew 81 mg  -     furosemide (LASIX) 20 mg tablet; Take 1 tablet by mouth 2 (two) times a day  -     levalbuterol (XOPENEX) 0 63 mg/3 mL nebulizer solution; Take 0 63 mg by nebulization every 4 (four) hours as needed for wheezing  -     Hm Mammography  -     Hemoglobin A1C With EAG (Historical)      Follow up in 3 months     Subjective:      Patient ID: Thomas Carson is a [de-identified] y o  female  Patient is here to establish care-    She has a history of multiple medical problems  She was recently hospitalized at [address] [place] [address] from April 25th to April 29th due to NSTEMI, S/P cardiac cath without a stent  She denies any chest pain at this time and has been taking her medications as prescribed  She had an echo done while in the hospital which showed an EF of 25%  She does have shortness of breath on exertion and is currently on 2 liters of oxygen throughout her day     Has a history of 30 years of smoking however quit 25 years ago and has been diagnosed with emphysema  Also has a history of diabetes without insulin and is currently on metformin daily  She had blood work done in the hospital which showed low TSH however normal T4 as well as evidence of anemia  She denies any symptoms such as heat cold intolerance or losing her hair  She does feel fatigued however attributes this to shortness of breath to her emphysema and heart condition  The following portions of the patient's history were reviewed and updated as appropriate:   She  has a past medical history of Cardiac disease; CHF (congestive heart failure) ([facility] ); Diabetes mellitus ([facility] ); Emphysema lung ([facility] ); History of fall; Hyperlipidemia; Hypertension; Incontinence in female; Pulmonary emphysema ([facility] ); and Wound infection after surgery  She   Patient Active Problem List    Diagnosis Date Noted    NSTEMI (non-ST elevated myocardial infarction) ([facility] ) 2018    Type 2 diabetes mellitus treated without insulin ([facility] ) 2018    Abnormal TSH 2018    Chronic systolic dysfunction of left ventricle 2018    Pulmonary emphysema ([facility] ) 2018    Anemia 2018    Screening for osteoporosis 2018    Local infection of skin and subcutaneous tissue 2017    Fall 2017    Physical deconditioning 2017    Incontinence of urine in female 2017    Pain 2017    Pain in scapula 2017    Pain in back 2017     She  has a past surgical history that includes  section; Appendectomy; Lumbar fusion (N/A, 2017); Incision and drainage posterior spine (N/A, 2017); and pr i&d, post spine, cerv/thor/cerv-thor (N/A, 2017)  Her family history includes No Known Problems in her mother  She  reports that she has quit smoking  She has never used smokeless tobacco  She reports that she does not drink alcohol or use drugs    Current Outpatient Prescriptions   Medication Sig Dispense Refill    albuterol (2 5 mg/3 mL) 0 083 % nebulizer solution Take 2 5 mg by nebulization every 6 (six) hours as needed for wheezing      aspirin 81 mg chewable tablet Chew 81 mg      atorvastatin (LIPITOR) 80 mg tablet Take 80 mg by mouth daily      b complex vitamins tablet Take 1 tablet by mouth daily      carvedilol (COREG) 3 125 mg tablet Take 3 125 mg by mouth 2 (two) times a day with meals      cholecalciferol (VITAMIN D3) 1,000 units tablet Take 1,000 Units by mouth daily      Coenzyme Q10 (COQ10) 100 MG CAPS Take 300 mg by mouth daily        furosemide (LASIX) 20 mg tablet Take 1 tablet by mouth 2 (two) times a day      gabapentin (NEURONTIN) 300 mg capsule Take 1 capsule (300 mg total) by mouth daily at bedtime 60 capsule 0    levalbuterol (XOPENEX) 0 63 mg/3 mL nebulizer solution Take 0 63 mg by nebulization every 4 (four) hours as needed for wheezing      lisinopril (ZESTRIL) 2 5 mg tablet Take 2 5 mg by mouth daily at bedtime        metFORMIN (GLUCOPHAGE) 500 mg tablet Take 500 mg by mouth 2 (two) times a day with meals      montelukast (SINGULAIR) 10 mg tablet Take 10 mg by mouth daily at bedtime      tiotropium (SPIRIVA) 18 mcg inhalation capsule Place 18 mcg into inhaler and inhale as needed         No current facility-administered medications for this visit        Current Outpatient Prescriptions on File Prior to Visit   Medication Sig    albuterol (2 5 mg/3 mL) 0 083 % nebulizer solution Take 2 5 mg by nebulization every 6 (six) hours as needed for wheezing    atorvastatin (LIPITOR) 80 mg tablet Take 80 mg by mouth daily    b complex vitamins tablet Take 1 tablet by mouth daily    carvedilol (COREG) 3 125 mg tablet Take 3 125 mg by mouth 2 (two) times a day with meals    cholecalciferol (VITAMIN D3) 1,000 units tablet Take 1,000 Units by mouth daily    Coenzyme Q10 (COQ10) 100 MG CAPS Take 300 mg by mouth daily      gabapentin (NEURONTIN) 300 mg capsule Take 1 capsule (300 mg total) by mouth daily at bedtime    lisinopril (ZESTRIL) 2 5 mg tablet Take 2 5 mg by mouth daily at bedtime      metFORMIN (GLUCOPHAGE) 500 mg tablet Take 500 mg by mouth 2 (two) times a day with meals    montelukast (SINGULAIR) 10 mg tablet Take 10 mg by mouth daily at bedtime    tiotropium (SPIRIVA) 18 mcg inhalation capsule Place 18 mcg into inhaler and inhale as needed       No current facility-administered medications on file prior to visit  She has No Known Allergies       Review of Systems   Constitutional: Negative for activity change, appetite change, fatigue and fever  HENT: Negative for congestion and ear discharge  Respiratory: Positive for shortness of breath  Negative for cough  Cardiovascular: Negative for chest pain and palpitations  Gastrointestinal: Negative for diarrhea and nausea  Musculoskeletal: Negative for arthralgias and back pain  Skin: Negative for color change and rash  Neurological: Negative for dizziness and headaches  Psychiatric/Behavioral: Negative for agitation and behavioral problems  Objective:      /60   Pulse 73   Temp 98 3 °F (36 8 °C)   Ht 5' 1" (1 549 m)   Wt 52 1 kg (114 lb 12 8 oz)   SpO2 98% Comment: Portable compressor  BMI 21 69 kg/m²          Physical Exam   Constitutional: She is oriented to person, place, and time  She appears well-developed and well-nourished  No distress  HENT:   Head: Normocephalic and atraumatic  Nose: Nose normal    Mouth/Throat: Oropharynx is clear and moist    Eyes: Conjunctivae are normal  Pupils are equal, round, and reactive to light  Cardiovascular: Normal rate, regular rhythm and normal heart sounds  No murmur heard  Pulmonary/Chest: Effort normal and breath sounds normal  No respiratory distress  She has no wheezes  Abdominal: Soft  Bowel sounds are normal  She exhibits no distension  There is no tenderness  Neurological: She is alert and oriented to person, place, and time  Skin: Skin is warm and dry  No rash noted   She is not diaphoretic  No erythema  Psychiatric: She has a normal mood and affect

## 2018-05-22 ENCOUNTER — TELEPHONE (OUTPATIENT)
Dept: FAMILY MEDICINE CLINIC | Facility: CLINIC | Age: 83
End: 2018-05-22

## 2018-05-22 NOTE — TELEPHONE ENCOUNTER
Stacie Lockhart from [facility] calls, she went out to see Sedrick Marsh today for skilled care  Sedrick Rater doesn't want her to come any more,doesn't have time with all her husbands issues   So they will stop service

## 2018-05-29 ENCOUNTER — HOSPITAL ENCOUNTER (OUTPATIENT)
Dept: BONE DENSITY | Facility: CLINIC | Age: 83
Discharge: HOME | End: 2018-05-29
Payer: MEDICARE

## 2018-05-29 DIAGNOSIS — Z13.820 SCREENING FOR OSTEOPOROSIS: ICD-10-CM

## 2018-05-29 PROCEDURE — 77080 DXA BONE DENSITY AXIAL: CPT

## 2018-06-14 DIAGNOSIS — I10 ESSENTIAL HYPERTENSION: Primary | ICD-10-CM

## 2018-06-14 RX ORDER — LISINOPRIL 2.5 MG/1
2.5 TABLET ORAL
Qty: 30 TABLET | Refills: 5 | Status: SHIPPED | OUTPATIENT
Start: 2018-06-14 | End: 2019-01-10 | Stop reason: SDUPTHER

## 2018-08-17 ENCOUNTER — OFFICE VISIT (OUTPATIENT)
Dept: FAMILY MEDICINE CLINIC | Facility: CLINIC | Age: 83
End: 2018-08-17
Payer: MEDICARE

## 2018-08-17 VITALS
HEIGHT: 61 IN | BODY MASS INDEX: 23.22 KG/M2 | HEART RATE: 76 BPM | RESPIRATION RATE: 18 BRPM | DIASTOLIC BLOOD PRESSURE: 76 MMHG | SYSTOLIC BLOOD PRESSURE: 122 MMHG | TEMPERATURE: 98.6 F | OXYGEN SATURATION: 98 % | WEIGHT: 123 LBS

## 2018-08-17 DIAGNOSIS — E11.9 TYPE 2 DIABETES MELLITUS TREATED WITHOUT INSULIN (HCC): Primary | ICD-10-CM

## 2018-08-17 DIAGNOSIS — R06.00 DYSPNEA ON EXERTION: ICD-10-CM

## 2018-08-17 DIAGNOSIS — D64.9 ANEMIA, UNSPECIFIED TYPE: ICD-10-CM

## 2018-08-17 PROBLEM — R06.09 DYSPNEA ON EXERTION: Status: ACTIVE | Noted: 2018-08-17

## 2018-08-17 LAB — SL AMB POCT HEMOGLOBIN AIC: 5.5

## 2018-08-17 PROCEDURE — 83036 HEMOGLOBIN GLYCOSYLATED A1C: CPT | Performed by: FAMILY MEDICINE

## 2018-08-17 PROCEDURE — 99214 OFFICE O/P EST MOD 30 MIN: CPT | Performed by: FAMILY MEDICINE

## 2018-08-17 PROCEDURE — 36416 COLLJ CAPILLARY BLOOD SPEC: CPT | Performed by: FAMILY MEDICINE

## 2018-08-17 NOTE — PROGRESS NOTES
Assessment/Plan:    No problem-specific Assessment & Plan notes found for this encounter  Diagnoses and all orders for this visit:    Type 2 diabetes mellitus treated without insulin ([facility] )  -     POCT hemoglobin A1c, 5 5  After discussing risks and benefits with patient  Given the patient is over 74years old  She was advised to take metformin 500 mg once a day  Consider stopping the medication altogether  Re-evaluate at next hemoglobin A1c draw  -     Comprehensive metabolic panel; Future  -     Microalbumin / creatinine urine ratio  -     metFORMIN (GLUCOPHAGE) 500 mg tablet; Take 1 tablet (500 mg total) by mouth daily with breakfast for 30 days    Anemia, unspecified type  Unclear etiology  -     CBC and differential; Future  -     Vitamin B12; Future  -     Folate; Future  -     Iron; Future  -     Ferritin; Future  -     Occult blood 1-3, stool; Future    Dyspnea on exertion  Possibly multifactorial in nature  She was advised to follow up with Dr Nasir Brennan, Pulmonary  She was also advised to follow up with Dr Joseph Henry, Cardiology  The cause could be either pulmonary or cardiac at this time  Follow-up in 3 months  Subjective:      Patient ID: Isabel Ryan is a 82 y o  female  Patient is here to follow-up for chronic medical problems  History of type 2 diabetes mellitus treated without insulin  She says her blood glucose has been under control at home  She denies any symptoms such as polyuria polyphagia polydipsia at this time  She takes metformin 500 mg twice daily  Denies any side effects from medications  She has a history of anemia that was noted back in May 2018  It is unclear whether she had follow up blood work to further evaluate/explore the cause of her anemia  She does relate a distant history of GI bleeding in the past many years ago  She denies any gross blood at this time in her stool  She denies any symptoms such as feeling fatigued for tired    She is complaining of shortness of breath on exertion  She does have a history of emphysema/COPD  She is currently on 3 L of oxygen all the time  She is also on Spiriva inhaler  She Dr Kirstie Hou for pulmonary concerns  She also has a history of diastolic dysfunction, congestive heart failure  Ejection fraction 20-30%  She sees Dr Nguyễn Ohara for Cardiology  No other complaints  The following portions of the patient's history were reviewed and updated as appropriate:   She  has a past medical history of Cardiac disease; CHF (congestive heart failure) ([facility] ); Diabetes mellitus ([facility] ); Emphysema lung ([facility] ); History of fall; Hyperlipidemia; Hypertension; Incontinence in female; Pulmonary emphysema ([facility] ); and Wound infection after surgery  She   Patient Active Problem List    Diagnosis Date Noted    Dyspnea on exertion 2018    NSTEMI (non-ST elevated myocardial infarction) ([facility] ) 2018    Type 2 diabetes mellitus treated without insulin ([place] [address] ) 2018    Abnormal TSH 2018    Chronic systolic dysfunction of left ventricle 2018    Pulmonary emphysema ([place] [address] ) 2018    Anemia 2018    Screening for osteoporosis 2018    Local infection of skin and subcutaneous tissue 2017    Fall 2017    Physical deconditioning 2017    Incontinence of urine in female 2017    Pain 2017    Pain in scapula 2017    Pain in back 2017     She  has a past surgical history that includes  section; Appendectomy; Lumbar fusion (N/A, 2017); Incision and drainage posterior spine (N/A, 2017); and pr i&d, post spine, cerv/thor/cerv-thor (N/A, 2017)  Her family history includes No Known Problems in her mother  She  reports that she has quit smoking  She has never used smokeless tobacco  She reports that she does not drink alcohol or use drugs    Current Outpatient Prescriptions   Medication Sig Dispense Refill    albuterol (2 5 mg/3 mL) 0 083 % nebulizer solution Take 2 5 mg by nebulization every 6 (six) hours as needed for wheezing      aspirin 81 mg chewable tablet Chew 81 mg      atorvastatin (LIPITOR) 80 mg tablet Take 80 mg by mouth daily      b complex vitamins tablet Take 1 tablet by mouth daily      carvedilol (COREG) 3 125 mg tablet Take 3 125 mg by mouth 2 (two) times a day with meals      cholecalciferol (VITAMIN D3) 1,000 units tablet Take 1,000 Units by mouth daily      Coenzyme Q10 (COQ10) 100 MG CAPS Take 300 mg by mouth daily        levalbuterol (XOPENEX) 0 63 mg/3 mL nebulizer solution Take 0 63 mg by nebulization every 4 (four) hours as needed for wheezing      lisinopril (ZESTRIL) 2 5 mg tablet Take 1 tablet (2 5 mg total) by mouth daily at bedtime 30 tablet 5    metFORMIN (GLUCOPHAGE) 500 mg tablet Take 1 tablet (500 mg total) by mouth daily with breakfast for 30 days 30 tablet 2    montelukast (SINGULAIR) 10 mg tablet Take 10 mg by mouth daily at bedtime      tiotropium (SPIRIVA) 18 mcg inhalation capsule Place 18 mcg into inhaler and inhale as needed        furosemide (LASIX) 20 mg tablet Take 1 tablet by mouth 2 (two) times a day      gabapentin (NEURONTIN) 300 mg capsule Take 1 capsule (300 mg total) by mouth daily at bedtime (Patient not taking: Reported on 8/17/2018 ) 60 capsule 0     No current facility-administered medications for this visit        Current Outpatient Prescriptions on File Prior to Visit   Medication Sig    albuterol (2 5 mg/3 mL) 0 083 % nebulizer solution Take 2 5 mg by nebulization every 6 (six) hours as needed for wheezing    aspirin 81 mg chewable tablet Chew 81 mg    atorvastatin (LIPITOR) 80 mg tablet Take 80 mg by mouth daily    b complex vitamins tablet Take 1 tablet by mouth daily    carvedilol (COREG) 3 125 mg tablet Take 3 125 mg by mouth 2 (two) times a day with meals    cholecalciferol (VITAMIN D3) 1,000 units tablet Take 1,000 Units by mouth daily    Coenzyme Q10 (COQ10) 100 MG CAPS Take 300 mg by mouth daily      levalbuterol (XOPENEX) 0 63 mg/3 mL nebulizer solution Take 0 63 mg by nebulization every 4 (four) hours as needed for wheezing    lisinopril (ZESTRIL) 2 5 mg tablet Take 1 tablet (2 5 mg total) by mouth daily at bedtime    montelukast (SINGULAIR) 10 mg tablet Take 10 mg by mouth daily at bedtime    tiotropium (SPIRIVA) 18 mcg inhalation capsule Place 18 mcg into inhaler and inhale as needed      [DISCONTINUED] metFORMIN (GLUCOPHAGE) 500 mg tablet Take 500 mg by mouth 2 (two) times a day with meals    furosemide (LASIX) 20 mg tablet Take 1 tablet by mouth 2 (two) times a day    gabapentin (NEURONTIN) 300 mg capsule Take 1 capsule (300 mg total) by mouth daily at bedtime (Patient not taking: Reported on 8/17/2018 )     No current facility-administered medications on file prior to visit  She has No Known Allergies       Review of Systems   Constitutional: Negative for activity change, appetite change, fatigue and fever  HENT: Negative for congestion and ear discharge  Respiratory: Positive for shortness of breath  Negative for cough  Cardiovascular: Negative for chest pain and palpitations  Gastrointestinal: Negative for diarrhea and nausea  Musculoskeletal: Negative for arthralgias and back pain  Skin: Negative for color change and rash  Neurological: Negative for dizziness and headaches  Psychiatric/Behavioral: Negative for agitation and behavioral problems  Objective:      /76   Pulse 76   Temp 98 6 °F (37 °C) (Tympanic)   Resp 18   Ht 5' 1" (1 549 m)   Wt 55 8 kg (123 lb)   SpO2 98%   BMI 23 24 kg/m²          Physical Exam   Constitutional: She is oriented to person, place, and time  She appears well-developed and well-nourished  No distress  HENT:   Head: Normocephalic and atraumatic     Nose: Nose normal    Mouth/Throat: Oropharynx is clear and moist    Eyes: Conjunctivae are normal  Pupils are equal, round, and reactive to light  Cardiovascular: Normal rate, regular rhythm and normal heart sounds  No murmur heard  Pulmonary/Chest: Effort normal and breath sounds normal  No respiratory distress  She has no wheezes  Abdominal: Soft  Bowel sounds are normal  She exhibits no distension  There is no tenderness  Neurological: She is alert and oriented to person, place, and time  Skin: Skin is warm and dry  No rash noted  She is not diaphoretic  No erythema  Psychiatric: She has a normal mood and affect

## 2018-08-21 ENCOUNTER — APPOINTMENT (OUTPATIENT)
Dept: LAB | Facility: CLINIC | Age: 83
End: 2018-08-21
Payer: MEDICARE

## 2018-08-21 DIAGNOSIS — D64.9 ANEMIA, UNSPECIFIED TYPE: ICD-10-CM

## 2018-08-21 DIAGNOSIS — E11.9 TYPE 2 DIABETES MELLITUS TREATED WITHOUT INSULIN (HCC): ICD-10-CM

## 2018-08-21 LAB
ALBUMIN SERPL BCP-MCNC: 3.6 G/DL (ref 3.5–5)
ALP SERPL-CCNC: 52 U/L (ref 46–116)
ALT SERPL W P-5'-P-CCNC: 21 U/L (ref 12–78)
ANION GAP SERPL CALCULATED.3IONS-SCNC: 5 MMOL/L (ref 4–13)
AST SERPL W P-5'-P-CCNC: 14 U/L (ref 5–45)
BASOPHILS # BLD AUTO: 0.03 THOUSANDS/ΜL (ref 0–0.1)
BASOPHILS NFR BLD AUTO: 1 % (ref 0–1)
BILIRUB SERPL-MCNC: 0.48 MG/DL (ref 0.2–1)
BUN SERPL-MCNC: 15 MG/DL (ref 5–25)
CALCIUM SERPL-MCNC: 9.2 MG/DL (ref 8.3–10.1)
CHLORIDE SERPL-SCNC: 96 MMOL/L (ref 100–108)
CO2 SERPL-SCNC: 30 MMOL/L (ref 21–32)
CREAT SERPL-MCNC: 0.53 MG/DL (ref 0.6–1.3)
CREAT UR-MCNC: 36.4 MG/DL
EOSINOPHIL # BLD AUTO: 0.37 THOUSAND/ΜL (ref 0–0.61)
EOSINOPHIL NFR BLD AUTO: 8 % (ref 0–6)
ERYTHROCYTE [DISTWIDTH] IN BLOOD BY AUTOMATED COUNT: 13.7 % (ref 11.6–15.1)
FERRITIN SERPL-MCNC: 32 NG/ML (ref 8–388)
FOLATE SERPL-MCNC: >20 NG/ML (ref 3.1–17.5)
GFR SERPL CREATININE-BSD FRML MDRD: 89 ML/MIN/1.73SQ M
GLUCOSE P FAST SERPL-MCNC: 96 MG/DL (ref 65–99)
HCT VFR BLD AUTO: 37.2 % (ref 34.8–46.1)
HGB BLD-MCNC: 12.2 G/DL (ref 11.5–15.4)
IMM GRANULOCYTES # BLD AUTO: 0.01 THOUSAND/UL (ref 0–0.2)
IMM GRANULOCYTES NFR BLD AUTO: 0 % (ref 0–2)
IRON SERPL-MCNC: 82 UG/DL (ref 50–170)
LYMPHOCYTES # BLD AUTO: 1.38 THOUSANDS/ΜL (ref 0.6–4.47)
LYMPHOCYTES NFR BLD AUTO: 31 % (ref 14–44)
MCH RBC QN AUTO: 30.3 PG (ref 26.8–34.3)
MCHC RBC AUTO-ENTMCNC: 32.8 G/DL (ref 31.4–37.4)
MCV RBC AUTO: 92 FL (ref 82–98)
MICROALBUMIN UR-MCNC: 41.7 MG/L (ref 0–20)
MICROALBUMIN/CREAT 24H UR: 115 MG/G CREATININE (ref 0–30)
MONOCYTES # BLD AUTO: 0.63 THOUSAND/ΜL (ref 0.17–1.22)
MONOCYTES NFR BLD AUTO: 14 % (ref 4–12)
NEUTROPHILS # BLD AUTO: 2.01 THOUSANDS/ΜL (ref 1.85–7.62)
NEUTS SEG NFR BLD AUTO: 46 % (ref 43–75)
NRBC BLD AUTO-RTO: 0 /100 WBCS
PLATELET # BLD AUTO: 199 THOUSANDS/UL (ref 149–390)
PMV BLD AUTO: 9.9 FL (ref 8.9–12.7)
POTASSIUM SERPL-SCNC: 4 MMOL/L (ref 3.5–5.3)
PROT SERPL-MCNC: 6.9 G/DL (ref 6.4–8.2)
RBC # BLD AUTO: 4.03 MILLION/UL (ref 3.81–5.12)
SODIUM SERPL-SCNC: 131 MMOL/L (ref 136–145)
VIT B12 SERPL-MCNC: 638 PG/ML (ref 100–900)
WBC # BLD AUTO: 4.43 THOUSAND/UL (ref 4.31–10.16)

## 2018-08-21 PROCEDURE — 85025 COMPLETE CBC W/AUTO DIFF WBC: CPT

## 2018-08-21 PROCEDURE — 82728 ASSAY OF FERRITIN: CPT

## 2018-08-21 PROCEDURE — 36415 COLL VENOUS BLD VENIPUNCTURE: CPT

## 2018-08-21 PROCEDURE — 83540 ASSAY OF IRON: CPT

## 2018-08-21 PROCEDURE — 82043 UR ALBUMIN QUANTITATIVE: CPT | Performed by: FAMILY MEDICINE

## 2018-08-21 PROCEDURE — 82607 VITAMIN B-12: CPT

## 2018-08-21 PROCEDURE — 82746 ASSAY OF FOLIC ACID SERUM: CPT

## 2018-08-21 PROCEDURE — 80053 COMPREHEN METABOLIC PANEL: CPT

## 2018-08-21 PROCEDURE — 82570 ASSAY OF URINE CREATININE: CPT | Performed by: FAMILY MEDICINE

## 2018-08-22 ENCOUNTER — APPOINTMENT (OUTPATIENT)
Dept: LAB | Facility: CLINIC | Age: 83
End: 2018-08-22
Payer: MEDICARE

## 2018-08-22 DIAGNOSIS — D64.9 ANEMIA, UNSPECIFIED TYPE: ICD-10-CM

## 2018-08-22 LAB — HEMOCCULT STL QL IA: NEGATIVE

## 2018-08-22 PROCEDURE — G0328 FECAL BLOOD SCRN IMMUNOASSAY: HCPCS

## 2018-09-10 DIAGNOSIS — I10 ESSENTIAL HYPERTENSION: Primary | ICD-10-CM

## 2018-09-10 RX ORDER — CARVEDILOL 3.12 MG/1
3.12 TABLET ORAL 2 TIMES DAILY WITH MEALS
Qty: 120 TABLET | Refills: 5 | Status: SHIPPED | OUTPATIENT
Start: 2018-09-10 | End: 2019-09-09 | Stop reason: SDUPTHER

## 2018-10-17 ENCOUNTER — IMMUNIZATION (OUTPATIENT)
Dept: FAMILY MEDICINE CLINIC | Facility: CLINIC | Age: 83
End: 2018-10-17
Payer: MEDICARE

## 2018-10-17 DIAGNOSIS — Z23 ENCOUNTER FOR IMMUNIZATION: ICD-10-CM

## 2018-10-17 PROCEDURE — 90662 IIV NO PRSV INCREASED AG IM: CPT

## 2018-10-17 PROCEDURE — G0008 ADMIN INFLUENZA VIRUS VAC: HCPCS

## 2018-11-23 DIAGNOSIS — E78.2 MIXED HYPERLIPIDEMIA: Primary | ICD-10-CM

## 2018-11-23 DIAGNOSIS — Z91.09 ENVIRONMENTAL ALLERGIES: ICD-10-CM

## 2018-11-25 RX ORDER — ATORVASTATIN CALCIUM 80 MG/1
80 TABLET, FILM COATED ORAL DAILY
Qty: 90 TABLET | Refills: 0 | Status: SHIPPED | OUTPATIENT
Start: 2018-11-25 | End: 2019-03-29 | Stop reason: SDUPTHER

## 2018-11-25 RX ORDER — MONTELUKAST SODIUM 10 MG/1
10 TABLET ORAL
Qty: 90 TABLET | Refills: 0 | Status: SHIPPED | OUTPATIENT
Start: 2018-11-25 | End: 2019-04-08 | Stop reason: SDUPTHER

## 2018-12-04 ENCOUNTER — OFFICE VISIT (OUTPATIENT)
Dept: FAMILY MEDICINE CLINIC | Facility: CLINIC | Age: 83
End: 2018-12-04
Payer: MEDICARE

## 2018-12-04 VITALS
OXYGEN SATURATION: 94 % | RESPIRATION RATE: 18 BRPM | TEMPERATURE: 99.1 F | DIASTOLIC BLOOD PRESSURE: 58 MMHG | WEIGHT: 126 LBS | BODY MASS INDEX: 23.79 KG/M2 | HEART RATE: 74 BPM | HEIGHT: 61 IN | SYSTOLIC BLOOD PRESSURE: 102 MMHG

## 2018-12-04 DIAGNOSIS — Z00.00 MEDICARE ANNUAL WELLNESS VISIT, SUBSEQUENT: Primary | ICD-10-CM

## 2018-12-04 DIAGNOSIS — Z23 NEED FOR PNEUMOCOCCAL VACCINATION: ICD-10-CM

## 2018-12-04 DIAGNOSIS — E87.1 HYPONATREMIA: ICD-10-CM

## 2018-12-04 DIAGNOSIS — R09.81 NASAL CONGESTION: ICD-10-CM

## 2018-12-04 DIAGNOSIS — E28.39 MENOPAUSE OVARIAN FAILURE: ICD-10-CM

## 2018-12-04 PROCEDURE — G0439 PPPS, SUBSEQ VISIT: HCPCS | Performed by: FAMILY MEDICINE

## 2018-12-04 PROCEDURE — 90732 PPSV23 VACC 2 YRS+ SUBQ/IM: CPT

## 2018-12-04 PROCEDURE — G0009 ADMIN PNEUMOCOCCAL VACCINE: HCPCS

## 2018-12-04 PROCEDURE — 99213 OFFICE O/P EST LOW 20 MIN: CPT | Performed by: FAMILY MEDICINE

## 2018-12-04 RX ORDER — FLUTICASONE PROPIONATE 50 MCG
1 SPRAY, SUSPENSION (ML) NASAL DAILY
Qty: 16 G | Refills: 0 | Status: SHIPPED | OUTPATIENT
Start: 2018-12-04

## 2018-12-04 RX ORDER — GABAPENTIN 300 MG/1
300 CAPSULE ORAL
COMMUNITY
Start: 2018-02-27 | End: 2019-12-04 | Stop reason: ALTCHOICE

## 2018-12-04 NOTE — PROGRESS NOTES
Assessment/Plan:    No problem-specific Assessment & Plan notes found for this encounter  Diagnoses and all orders for this visit:    Medicare annual wellness visit, subsequent  See medicare wellness note    Need for pneumococcal vaccination  -     Pneumococcal Polysaccharide Vaccine 23-Valent =>1yo SQ IM    Nasal congestion  -     fluticasone (FLONASE) 50 mcg/act nasal spray; 1 spray into each nostril daily    Menopause ovarian failure  -     DXA bone density spine hip and pelvis; Future    Hyponatremia  No symptoms at this time will repeat blood work  -     Basic metabolic panel; Future    Other orders  -     gabapentin (NEURONTIN) 300 mg capsule; Take 300 mg by mouth      Follow up in 3 months    Subjective:      Patient ID: Godfrey Benítez is a 82 y o  female  Patient is here to follow-up for hyponatremia  She had blood work done recently which showed decreased sodium of 134 mg/dl  She denies any symptoms at this time such as confusion or dizziness related to this however  She does have a history of congestive heart failure with reduced ejection fraction of 20-30%  She does follow up with [facility] cardiologist at this time and she also follows a low-sodium diet  She also follows fluid restriction as well  She is also here because she has been having nasal congestion for the past several days  She does uses home oxygen all the time and has respiratory failure  She denies any other complaints  The following portions of the patient's history were reviewed and updated as appropriate:   She  has a past medical history of Cardiac disease; CHF (congestive heart failure) ([address] ); Diabetes mellitus ([address] ); Emphysema lung ([facility] ); History of fall; Hyperlipidemia; Hypertension; Incontinence in female; Pulmonary emphysema ([address] ); and Wound infection after surgery    She   Patient Active Problem List    Diagnosis Date Noted    Medicare annual wellness visit, subsequent 12/04/2018    Need for pneumococcal vaccination 2018    Nasal congestion 2018    Menopause ovarian failure 2018    Hyponatremia 2018    Dyspnea on exertion 2018    NSTEMI (non-ST elevated myocardial infarction) ([facility] ) 2018    Type 2 diabetes mellitus treated without insulin ([facility] ) 2018    Abnormal TSH 2018    Chronic systolic dysfunction of left ventricle 2018    Pulmonary emphysema ([facility] ) 2018    Anemia 2018    Screening for osteoporosis 2018    Local infection of skin and subcutaneous tissue 2017    Fall 2017    Physical deconditioning 2017    Incontinence of urine in female 2017    Pain 2017    Pain in scapula 2017    Pain in back 2017     She  has a past surgical history that includes  section; Appendectomy; Lumbar fusion (N/A, 2017); Incision and drainage posterior spine (N/A, 2017); and pr i&d, post spine, cerv/thor/cerv-thor (N/A, 2017)  Her family history includes No Known Problems in her mother  She  reports that she has quit smoking  She has never used smokeless tobacco  She reports that she does not drink alcohol or use drugs    Current Outpatient Prescriptions   Medication Sig Dispense Refill    albuterol (2 5 mg/3 mL) 0 083 % nebulizer solution Take 2 5 mg by nebulization every 6 (six) hours as needed for wheezing      aspirin 81 mg chewable tablet Chew 81 mg      atorvastatin (LIPITOR) 80 mg tablet Take 1 tablet (80 mg total) by mouth daily 90 tablet 0    b complex vitamins tablet Take 1 tablet by mouth daily      carvedilol (COREG) 3 125 mg tablet Take 1 tablet (3 125 mg total) by mouth 2 (two) times a day with meals 120 tablet 5    cholecalciferol (VITAMIN D3) 1,000 units tablet Take 1,000 Units by mouth daily      Coenzyme Q10 (COQ10) 100 MG CAPS Take 300 mg by mouth daily        furosemide (LASIX) 20 mg tablet Take 1 tablet by mouth 2 (two) times a day      gabapentin (NEURONTIN) 300 mg capsule Take 300 mg by mouth      levalbuterol (XOPENEX) 0 63 mg/3 mL nebulizer solution Take 0 63 mg by nebulization every 4 (four) hours as needed for wheezing      lisinopril (ZESTRIL) 2 5 mg tablet Take 1 tablet (2 5 mg total) by mouth daily at bedtime 30 tablet 5    montelukast (SINGULAIR) 10 mg tablet Take 1 tablet (10 mg total) by mouth daily at bedtime 90 tablet 0    tiotropium (SPIRIVA) 18 mcg inhalation capsule Place 18 mcg into inhaler and inhale as needed        fluticasone (FLONASE) 50 mcg/act nasal spray 1 spray into each nostril daily 16 g 0    gabapentin (NEURONTIN) 300 mg capsule Take 1 capsule (300 mg total) by mouth daily at bedtime (Patient not taking: Reported on 8/17/2018 ) 60 capsule 0    metFORMIN (GLUCOPHAGE) 500 mg tablet Take 1 tablet (500 mg total) by mouth daily with breakfast for 30 days 30 tablet 2     No current facility-administered medications for this visit        Current Outpatient Prescriptions on File Prior to Visit   Medication Sig    albuterol (2 5 mg/3 mL) 0 083 % nebulizer solution Take 2 5 mg by nebulization every 6 (six) hours as needed for wheezing    aspirin 81 mg chewable tablet Chew 81 mg    atorvastatin (LIPITOR) 80 mg tablet Take 1 tablet (80 mg total) by mouth daily    b complex vitamins tablet Take 1 tablet by mouth daily    carvedilol (COREG) 3 125 mg tablet Take 1 tablet (3 125 mg total) by mouth 2 (two) times a day with meals    cholecalciferol (VITAMIN D3) 1,000 units tablet Take 1,000 Units by mouth daily    Coenzyme Q10 (COQ10) 100 MG CAPS Take 300 mg by mouth daily      furosemide (LASIX) 20 mg tablet Take 1 tablet by mouth 2 (two) times a day    levalbuterol (XOPENEX) 0 63 mg/3 mL nebulizer solution Take 0 63 mg by nebulization every 4 (four) hours as needed for wheezing    lisinopril (ZESTRIL) 2 5 mg tablet Take 1 tablet (2 5 mg total) by mouth daily at bedtime    montelukast (SINGULAIR) 10 mg tablet Take 1 tablet (10 mg total) by mouth daily at bedtime    tiotropium (SPIRIVA) 18 mcg inhalation capsule Place 18 mcg into inhaler and inhale as needed      gabapentin (NEURONTIN) 300 mg capsule Take 1 capsule (300 mg total) by mouth daily at bedtime (Patient not taking: Reported on 8/17/2018 )    metFORMIN (GLUCOPHAGE) 500 mg tablet Take 1 tablet (500 mg total) by mouth daily with breakfast for 30 days     No current facility-administered medications on file prior to visit  She is allergic to no known allergies       Review of Systems   Constitutional: Negative for activity change, appetite change, fatigue and fever  HENT: Positive for postnasal drip and rhinorrhea  Negative for congestion and ear discharge  Respiratory: Negative for cough and shortness of breath  Cardiovascular: Negative for chest pain and palpitations  Gastrointestinal: Negative for diarrhea and nausea  Musculoskeletal: Negative for arthralgias and back pain  Skin: Negative for color change and rash  Neurological: Negative for dizziness and headaches  Psychiatric/Behavioral: Negative for agitation and behavioral problems  Objective:      /58   Pulse 74   Temp 99 1 °F (37 3 °C) (Tympanic)   Resp 18   Ht 5' 1" (1 549 m)   Wt 57 2 kg (126 lb)   SpO2 94%   BMI 23 81 kg/m²          Physical Exam   Constitutional: She is oriented to person, place, and time  She appears well-developed and well-nourished  No distress  HENT:   Head: Normocephalic and atraumatic  Nose: Nose normal    Mouth/Throat: Oropharynx is clear and moist    Eyes: Pupils are equal, round, and reactive to light  Conjunctivae are normal    Cardiovascular: Normal rate, regular rhythm and normal heart sounds  No murmur heard  Pulmonary/Chest: Effort normal and breath sounds normal  No respiratory distress  She has no wheezes  Abdominal: Soft  Bowel sounds are normal  She exhibits no distension  There is no tenderness     Neurological: She is alert and oriented to person, place, and time  Skin: Skin is warm and dry  No rash noted  She is not diaphoretic  No erythema  Psychiatric: She has a normal mood and affect

## 2018-12-04 NOTE — PROGRESS NOTES
Assessment and Plan:    Problem List Items Addressed This Visit     None      Visit Diagnoses     Need for pneumococcal vaccination    -  Primary    Relevant Orders    Pneumococcal Polysaccharide Vaccine 23-Valent =>1yo SQ IM        Health Maintenance Due   Topic Date Due    Medicare Annual Wellness Visit (AWV)  1935    Diabetic Foot Exam  1947    DM Eye Exam  1947    Pneumococcal PPSV23/PCV13 65+ Years / Low and Medium Risk (1 of 2 - PCV13) 2002         HPI:  Juan Crowley is a 82 y o  female here for her Subsequent Wellness Visit      Patient Active Problem List   Diagnosis    Fall    Physical deconditioning    Incontinence of urine in female    Pain    Pain in scapula    Pain in back    Local infection of skin and subcutaneous tissue    [facility]STEMI [facility]non-ST elevated [facility] infarction) [facility])    Type 2 diabetes mellitus treated without insulin (HCC)    Abnormal TSH    Chronic systolic dysfunction of left ventricle    Pulmonary emphysema ([facility])    Anemia    Screening for osteoporosis    Dyspnea on exertion     Past Medical History:   Diagnosis Date    Cardiac disease     [facility]     Diabetes mellitus [facility]     [facility]     History of fall     Hyperlipidemia     Hypertension     Incontinence in female     [facility] emph[facility]sema [facility]     Wound infection after surgery      Past Surgical History:   Procedure Laterality Date    APPENDECTOMY       SECTION      INCISION AND DRAINAGE POSTERIOR SPINE N/A 2017    Procedure: THORACIC WOUND EXPLORATION, WASHOUT, DEBRIDEMENT and CLOSURE;  Surgeon: Arcadio Jessica MD;  [facility];  Service: Orthopedics    LUMBAR FUSION N/A 2017    Procedure: T3-T8 Posterior Spinal Fusion;  Surgeon: Arcadio Jessica MD;  [facility];  Service:    Jerod Torrez PA I&D, POST SPINE, CERV/THOR/CERV-THOR N/A 2017    Procedure: I&D AND DEBRIDEMENT  of deep  of thoracic spine WOUND; Surgeon: Scarlett Pedro MD;  [facility];  Service: Orthopedics     Family History   Problem Relation Age of Onset    No Known Problems Mother      History   Smoking Status    Former Smoker   Smokeless Tobacco    Never Used     History   Alcohol Use No      History   Drug Use No       Current Outpatient Prescriptions   Medication Sig Dispense Refill    albuterol (2 5 mg/3 mL) 0 083 % nebulizer solution Take 2 5 mg by nebulization every 6 (six) hours as needed for wheezing      aspirin 81 mg chewable tablet Chew 81 mg      atorvastatin (LIPITOR) 80 mg tablet Take 1 tablet (80 mg total) by mouth daily 90 tablet 0    b complex vitamins tablet Take 1 tablet by mouth daily      carvedilol (COREG) 3 125 mg tablet Take 1 tablet (3 125 mg total) by mouth 2 (two) times a day with meals 120 tablet 5    cholecalciferol (VITAMIN D3) 1,000 units tablet Take 1,000 Units by mouth daily      Coenzyme Q10 (COQ10) 100 MG CAPS Take 300 mg by mouth daily        furosemide (LASIX) 20 mg tablet Take 1 tablet by mouth 2 (two) times a day      gabapentin (NEURONTIN) 300 mg capsule Take 1 capsule (300 mg total) by mouth daily at bedtime (Patient not taking: Reported on 8/17/2018 ) 60 capsule 0    levalbuterol (XOPENEX) 0 63 mg/3 mL nebulizer solution Take 0 63 mg by nebulization every 4 (four) hours as needed for wheezing      lisinopril (ZESTRIL) 2 5 mg tablet Take 1 tablet (2 5 mg total) by mouth daily at bedtime 30 tablet 5    metFORMIN (GLUCOPHAGE) 500 mg tablet Take 1 tablet (500 mg total) by mouth daily with breakfast for 30 days 30 tablet 2    montelukast (SINGULAIR) 10 mg tablet Take 1 tablet (10 mg total) by mouth daily at bedtime 90 tablet 0    tiotropium (SPIRIVA) 18 mcg inhalation capsule Place 18 mcg into inhaler and inhale as needed         No current facility-administered medications for this visit        Allergies   Allergen Reactions    No Known Allergies Other (See Comments)     Immunization History   Administered Date(s) Administered    Influenza Quadrivalent Preservative Free 3 years and older IM 11/29/2017    Influenza, high dose seasonal 0 5 mL 10/17/2018    Tdap 04/13/2017       Patient Care Team:  Navneet Lara MD as PCP - General (Family Medicine)  MD Joseph Hopkins Marva, DO    Medicare Screening Tests and Risk Assessments:  Mary Kay Blackwell is here for her Initial Wellness visit  Health Risk Assessment:  Patient rates overall health as fair  Patient feels that their physical health rating is Slightly worse  Eyesight was rated as Same  Hearing was rated as Same  Patient feels that their emotional and mental health rating is Same  Pain experienced by patient in the last 7 days has been None  Patient states that she has experienced no weight loss or gain in last 6 months  Emotional/Mental Health:  Patient has been feeling nervous/anxious  PHQ-9 Depression Screening:    Frequency of the following problems over the past two weeks:      1  Little interest or pleasure in doing things: 0 - not at all      2  Feeling down, depressed, or hopeless: 0 - not at all  PHQ-2 Score: 0          Broken Bones/Falls: Fall Risk Assessment:    In the past year, patient has experienced: No history of falling in past year          Bladder/Bowel:  Patient has not leaked urine accidently in the last six months  Patient reports no loss of bowel control  Immunizations:  Patient has had a flu vaccination within the last year  Patient has received a pneumonia shot  Patient has not received a shingles shot  Patient has received tetanus/diphtheria shot  Home Safety:  Patient does not have trouble with stairs inside or outside of their home  Patient currently reports that there are no safety hazards present in home, working smoke alarms, working carbon monoxide detectors        Preventative Screenings:   No breast cancer screening performed, no colon cancer screen completed, cholesterol screen completed, no glaucoma eye exam completed    Nutrition:  Current diet: Regular with servings of the following:    Medications:  Patient is not currently taking any over-the-counter supplements  Patient is able to manage medications  Lifestyle Choices:  Patient reports no tobacco use  Patient has not smoked or used tobacco in the past   Patient reports no alcohol use  Patient drives a vehicle  Patient wears seat belt  Current level of exercise of physical activity described by patient as: n/a  Activities of Daily Living:  Can get out of bed by his or her self, able to dress self, able to make own meals, able to do own shopping, able to bathe self, can do own laundry/housekeeping, can manage own money, pay bills and track expenses    Previous Hospitalizations:  Hospitalization or ED visit in past 12 months  Number of hospitalizations within the last year: 1-2        Advanced Directives:  Patient has not decided on power of   Patient has not completed advanced directive  Preventative Screening/Counseling:      Cardiovascular:      General: Screening Current          Diabetes:      General: Screening Current          Colorectal Cancer:      General: Screening Not Indicated          Breast Cancer:      General: Screening Not Indicated          Cervical Cancer:      General: Screening Not Indicated          Osteoporosis:      Due for studies: DXA Axial          AAA:      General: Screening Not Indicated          Glaucoma:      General: Screening Current          HIV:      General: Screening Not Indicated          Hepatitis C:      General: Screening Not Indicated        Advanced Directives:   5 wishes given       Immunizations:      Influenza: Risks & Benefits Discussed and Influenza UTD This Year      Pneumococcal: Lifetime Vaccine Completed      Hepatitis B (Medium to high risk patients): Risks & Benefits Discussed      Zostavax: Patient Declines      TD: Td Vaccine UTD      TDAP: Tdap Vaccine UTD            Assessment and Plan:    Problem List Items Addressed This Visit     None      Visit Diagnoses     Need for pneumococcal vaccination    -  Primary    Relevant Orders    Pneumococcal Polysaccharide Vaccine 23-Valent =>1yo SQ IM (Completed)    Nasal congestion        Relevant Medications    fluticasone (FLONASE) 50 mcg/act nasal spray    Menopause ovarian failure        Relevant Orders    DXA bone density spine hip and pelvis    Hyponatremia        Relevant Orders    Basic metabolic panel    Medicare annual wellness visit, subsequent            Health Maintenance Due   Topic Date Due    Medicare Annual Wellness Visit (AWV)  1935    Diabetic Foot Exam  1947    DM Eye Exam  1947         HPI:  Андрей Juarez is a 82 y o  female here for her Subsequent Wellness Visit      Patient Active Problem List   Diagnosis    Fall    Physical deconditioning    Incontinence of urine in female    Pain    Pain in scapula    Pain in back    Local infection of skin and subcutaneous tissue    [facility]STEM[facility]non-ST elevated [facility]    Type 2 diabetes mellitus treated without insulin (HCC)    Abnormal TSH    Chronic systolic dysfunction of left ventricle    Pulmonary emphysema ([facility])    Anemia    Screening for osteoporosis    Dyspnea on exertion     Past Medical History:   Diagnosis Date    Cardiac disease     [facility]est[facility]) [facility]     Diabetes mellitus [facility]     [facility]     History of fall     Hyperlipidemia     Hypertension     Incontinence in female     Pulmonary emphysema [facility]     Wound infection after surgery      Past Surgical History:   Procedure Laterality Date    APPENDECTOMY       SECTION      INCISION AND DRAINAGE POSTERIOR SPINE N/A 2017    Procedure: THORACIC WOUND EXPLORATION, WASHOUT, DEBRIDEMENT and CLOSURE;  Surgeon: Mignon Bartlett MD;  [facility];  Service: Orthopedics    LUMBAR FUSION N/A 4/14/2017    Procedure: T3-T8 Posterior Spinal Fusion;  Surgeon: Juanjose Norman MD;  [facility];  Service:    [facility] I&D, POST SPINE, CERV/THOR/CERV-THOR N/A 11/28/2017    Procedure: I&D AND DEBRIDEMENT  of deep  of thoracic spine WOUND;  Surgeon: Juanjose Norman MD;  [facility];  Service: Orthopedics     Family History   Problem Relation Age of Onset    No Known Problems Mother      History   Smoking Status    Former Smoker   Smokeless Tobacco    Never Used     History   Alcohol Use No      History   Drug Use No       Current Outpatient Prescriptions   Medication Sig Dispense Refill    albuterol (2 5 mg/3 mL) 0 083 % nebulizer solution Take 2 5 mg by nebulization every 6 (six) hours as needed for wheezing      aspirin 81 mg chewable tablet Chew 81 mg      atorvastatin (LIPITOR) 80 mg tablet Take 1 tablet (80 mg total) by mouth daily 90 tablet 0    b complex vitamins tablet Take 1 tablet by mouth daily      carvedilol (COREG) 3 125 mg tablet Take 1 tablet (3 125 mg total) by mouth 2 (two) times a day with meals 120 tablet 5    cholecalciferol (VITAMIN D3) 1,000 units tablet Take 1,000 Units by mouth daily      Coenzyme Q10 (COQ10) 100 MG CAPS Take 300 mg by mouth daily        furosemide (LASIX) 20 mg tablet Take 1 tablet by mouth 2 (two) times a day      gabapentin (NEURONTIN) 300 mg capsule Take 300 mg by mouth      levalbuterol (XOPENEX) 0 63 mg/3 mL nebulizer solution Take 0 63 mg by nebulization every 4 (four) hours as needed for wheezing      lisinopril (ZESTRIL) 2 5 mg tablet Take 1 tablet (2 5 mg total) by mouth daily at bedtime 30 tablet 5    montelukast (SINGULAIR) 10 mg tablet Take 1 tablet (10 mg total) by mouth daily at bedtime 90 tablet 0    tiotropium (SPIRIVA) 18 mcg inhalation capsule Place 18 mcg into inhaler and inhale as needed        fluticasone (FLONASE) 50 mcg/act nasal spray 1 spray into each nostril daily 16 g 0    gabapentin (NEURONTIN) 300 mg capsule Take 1 capsule (300 mg total) by mouth daily at bedtime (Patient not taking: Reported on 8/17/2018 ) 60 capsule 0    metFORMIN (GLUCOPHAGE) 500 mg tablet Take 1 tablet (500 mg total) by mouth daily with breakfast for 30 days 30 tablet 2     No current facility-administered medications for this visit        Allergies   Allergen Reactions    No Known Allergies Other (See Comments)     Immunization History   Administered Date(s) Administered    Influenza Quadrivalent Preservative Free 3 years and older IM 11/29/2017    Influenza, high dose seasonal 0 5 mL 10/17/2018    Pneumococcal Polysaccharide PPV23 12/04/2018    Tdap 04/13/2017       Patient Care Team:  Ashley Ramírez MD as PCP - General (Family Medicine)  MD Dipti Mcgrath, DO    Medicare Screening Tests and Risk Assessments:  Medicare Annual Wellness Visit

## 2018-12-19 ENCOUNTER — TELEPHONE (OUTPATIENT)
Dept: FAMILY MEDICINE CLINIC | Facility: CLINIC | Age: 83
End: 2018-12-19

## 2018-12-19 NOTE — TELEPHONE ENCOUNTER
Pt called to ask if she is supposed to have dxa bone density spine,hip and pelvis done,her last one was done 05/29/18 @ [facility]  call pt w/ans [phone]

## 2019-01-10 DIAGNOSIS — I10 ESSENTIAL HYPERTENSION: ICD-10-CM

## 2019-01-10 RX ORDER — LISINOPRIL 2.5 MG/1
TABLET ORAL
Qty: 30 TABLET | Refills: 5 | Status: SHIPPED | OUTPATIENT
Start: 2019-01-10 | End: 2019-07-09 | Stop reason: SDUPTHER

## 2019-02-26 ENCOUNTER — APPOINTMENT (OUTPATIENT)
Dept: LAB | Facility: CLINIC | Age: 84
End: 2019-02-26
Payer: MEDICARE

## 2019-02-26 DIAGNOSIS — E87.1 HYPONATREMIA: ICD-10-CM

## 2019-02-26 LAB
ANION GAP SERPL CALCULATED.3IONS-SCNC: 6 MMOL/L (ref 4–13)
BUN SERPL-MCNC: 16 MG/DL (ref 5–25)
CALCIUM SERPL-MCNC: 8.8 MG/DL (ref 8.3–10.1)
CHLORIDE SERPL-SCNC: 98 MMOL/L (ref 100–108)
CO2 SERPL-SCNC: 30 MMOL/L (ref 21–32)
CREAT SERPL-MCNC: 0.56 MG/DL (ref 0.6–1.3)
GFR SERPL CREATININE-BSD FRML MDRD: 88 ML/MIN/1.73SQ M
GLUCOSE P FAST SERPL-MCNC: 100 MG/DL (ref 65–99)
POTASSIUM SERPL-SCNC: 4.1 MMOL/L (ref 3.5–5.3)
SODIUM SERPL-SCNC: 134 MMOL/L (ref 136–145)

## 2019-02-26 PROCEDURE — 36415 COLL VENOUS BLD VENIPUNCTURE: CPT

## 2019-02-26 PROCEDURE — 80048 BASIC METABOLIC PNL TOTAL CA: CPT

## 2019-03-05 ENCOUNTER — OFFICE VISIT (OUTPATIENT)
Dept: FAMILY MEDICINE CLINIC | Facility: CLINIC | Age: 84
End: 2019-03-05
Payer: MEDICARE

## 2019-03-05 VITALS
WEIGHT: 128.8 LBS | SYSTOLIC BLOOD PRESSURE: 112 MMHG | DIASTOLIC BLOOD PRESSURE: 62 MMHG | OXYGEN SATURATION: 100 % | HEART RATE: 69 BPM | HEIGHT: 61 IN | TEMPERATURE: 98.2 F | BODY MASS INDEX: 24.32 KG/M2

## 2019-03-05 DIAGNOSIS — M79.644 PAIN OF RIGHT THUMB: ICD-10-CM

## 2019-03-05 DIAGNOSIS — E87.1 HYPONATREMIA: ICD-10-CM

## 2019-03-05 DIAGNOSIS — E11.9 TYPE 2 DIABETES MELLITUS TREATED WITHOUT INSULIN (HCC): Primary | ICD-10-CM

## 2019-03-05 DIAGNOSIS — Z13.220 NEED FOR LIPID SCREENING: ICD-10-CM

## 2019-03-05 DIAGNOSIS — I50.32 CHRONIC DIASTOLIC CHF (CONGESTIVE HEART FAILURE) (HCC): ICD-10-CM

## 2019-03-05 PROCEDURE — 99214 OFFICE O/P EST MOD 30 MIN: CPT | Performed by: FAMILY MEDICINE

## 2019-03-05 NOTE — PROGRESS NOTES
Assessment/Plan:    No problem-specific Assessment & Plan notes found for this encounter  Diagnoses and all orders for this visit:    Type 2 diabetes mellitus treated without insulin ([facility] )  Stable controlled continue same medications and dose  -     Comprehensive metabolic panel; Future  -     Hemoglobin A1C; Future  -     Microalbumin / creatinine urine ratio    Hyponatremia  No symptoms at this time  -     Comprehensive metabolic panel; Future    Chronic diastolic CHF (congestive heart failure) ([facility])  No symptoms at this time  Pain of right thumb  -     XR hand 3+ vw right; Future  -     Ambulatory referral to Orthopedic Surgery; Future    Need for lipid screening  -     Lipid panel; Future      Follow up in 1 month or as needed    Subjective:      Patient ID: Black Cooney is a 82 y o  female  Diabetes Mellitus  Patient presents for follow up of diabetes  Current symptoms include: none  Symptoms have been well-controlled  Patient denies foot ulcerations, hyperglycemia, increased appetite, nausea, paresthesia of the feet, polydipsia and polyuria  Evaluation to date has included: fasting blood sugar and hemoglobin A1C  Home sugars: BGs consistently in an acceptable range  Current treatment: Continued metformin which has been effective  Also she was found to have hyponatremia per last blood work done which was improved from 6 months ago  She denies any symptoms related to this however  She does have a history of congestive heart failure  She denies any symptoms at this time such as shortness of breath or leg swelling  Also has been complaining of pain at the base of her right thumb that has been going on for weeks  She is left hand dominat             The following portions of the patient's history were reviewed and updated as appropriate:   She  has a past medical history of Cardiac disease, CHF (congestive heart failure) ([facility] ), Diabetes mellitus ([facility] ), Emphysema lung ([facility] ), History of fall, Hyperlipidemia, Hypertension, Incontinence in female, Pulmonary emphysema ([facility] ), and Wound infection after surgery  She   Patient Active Problem List    Diagnosis Date Noted    Chronic diastolic CHF (congestive heart failure) ([place] [address] ) 2019    Pain of right thumb 2019    Need for lipid screening 2019    Medicare annual wellness visit, subsequent 2018    Need for pneumococcal vaccination 2018    Nasal congestion 2018    Menopause ovarian failure 2018    Hyponatremia 2018    Dyspnea on exertion 2018    NSTEMI (non-ST elevated myocardial infarction) ([place] [address] ) 2018    Type 2 diabetes mellitus treated without insulin ([place] [address] ) 2018    Abnormal TSH 2018    Chronic systolic dysfunction of left ventricle 2018    Pulmonary emphysema ([place] [address] ) 2018    Anemia 2018    Screening for osteoporosis 2018    Local infection of skin and subcutaneous tissue 2017    Fall 2017    Physical deconditioning 2017    Incontinence of urine in female 2017    Pain 2017    Pain in scapula 2017    Pain in back 2017     She  has a past surgical history that includes  section; Appendectomy; Lumbar fusion (N/A, 2017); Incision and drainage posterior spine (N/A, 2017); and pr i&d, post spine, cerv/thor/cerv-thor (N/A, 2017)  Her family history includes No Known Problems in her mother  She  reports that she has quit smoking  She has never used smokeless tobacco  She reports that she does not drink alcohol or use drugs    Current Outpatient Medications   Medication Sig Dispense Refill    albuterol (2 5 mg/3 mL) 0 083 % nebulizer solution Take 2 5 mg by nebulization every 6 (six) hours as needed for wheezing      aspirin 81 mg chewable tablet Chew 81 mg      atorvastatin (LIPITOR) 80 mg tablet Take 1 tablet (80 mg total) by mouth daily 90 tablet 0    b complex vitamins tablet Take 1 tablet by mouth daily      carvedilol (COREG) 3 125 mg tablet Take 1 tablet (3 125 mg total) by mouth 2 (two) times a day with meals 120 tablet 5    cholecalciferol (VITAMIN D3) 1,000 units tablet Take 1,000 Units by mouth daily      Coenzyme Q10 (COQ10) 100 MG CAPS Take 300 mg by mouth daily        fluticasone (FLONASE) 50 mcg/act nasal spray 1 spray into each nostril daily 16 g 0    furosemide (LASIX) 20 mg tablet Take 1 tablet by mouth 2 (two) times a day      levalbuterol (XOPENEX) 0 63 mg/3 mL nebulizer solution Take 0 63 mg by nebulization every 4 (four) hours as needed for wheezing      lisinopril (ZESTRIL) 2 5 mg tablet TAKE ONE TABLET BY MOUTH EVERY DAY AT BEDTIME 30 tablet 5    metFORMIN (GLUCOPHAGE) 500 mg tablet Take 1 tablet (500 mg total) by mouth daily with breakfast for 30 days 30 tablet 2    montelukast (SINGULAIR) 10 mg tablet Take 1 tablet (10 mg total) by mouth daily at bedtime 90 tablet 0    tiotropium (SPIRIVA) 18 mcg inhalation capsule Place 18 mcg into inhaler and inhale as needed        gabapentin (NEURONTIN) 300 mg capsule Take 1 capsule (300 mg total) by mouth daily at bedtime (Patient not taking: Reported on 8/17/2018 ) 60 capsule 0    gabapentin (NEURONTIN) 300 mg capsule Take 300 mg by mouth       No current facility-administered medications for this visit        Current Outpatient Medications on File Prior to Visit   Medication Sig    albuterol (2 5 mg/3 mL) 0 083 % nebulizer solution Take 2 5 mg by nebulization every 6 (six) hours as needed for wheezing    aspirin 81 mg chewable tablet Chew 81 mg    atorvastatin (LIPITOR) 80 mg tablet Take 1 tablet (80 mg total) by mouth daily    b complex vitamins tablet Take 1 tablet by mouth daily    carvedilol (COREG) 3 125 mg tablet Take 1 tablet (3 125 mg total) by mouth 2 (two) times a day with meals    cholecalciferol (VITAMIN D3) 1,000 units tablet Take 1,000 Units by mouth daily    Coenzyme Q10 (COQ10) 100 MG CAPS Take 300 mg by mouth daily      fluticasone (FLONASE) 50 mcg/act nasal spray 1 spray into each nostril daily    furosemide (LASIX) 20 mg tablet Take 1 tablet by mouth 2 (two) times a day    levalbuterol (XOPENEX) 0 63 mg/3 mL nebulizer solution Take 0 63 mg by nebulization every 4 (four) hours as needed for wheezing    lisinopril (ZESTRIL) 2 5 mg tablet TAKE ONE TABLET BY MOUTH EVERY DAY AT BEDTIME    metFORMIN (GLUCOPHAGE) 500 mg tablet Take 1 tablet (500 mg total) by mouth daily with breakfast for 30 days    montelukast (SINGULAIR) 10 mg tablet Take 1 tablet (10 mg total) by mouth daily at bedtime    tiotropium (SPIRIVA) 18 mcg inhalation capsule Place 18 mcg into inhaler and inhale as needed      gabapentin (NEURONTIN) 300 mg capsule Take 1 capsule (300 mg total) by mouth daily at bedtime (Patient not taking: Reported on 8/17/2018 )    gabapentin (NEURONTIN) 300 mg capsule Take 300 mg by mouth     No current facility-administered medications on file prior to visit  She is allergic to no known allergies       Review of Systems   Constitutional: Negative for activity change, appetite change, fatigue and fever  HENT: Negative for congestion and ear discharge  Respiratory: Negative for cough and shortness of breath  Cardiovascular: Negative for chest pain and palpitations  Gastrointestinal: Negative for diarrhea and nausea  Musculoskeletal: Positive for arthralgias and myalgias  Negative for back pain  Skin: Negative for color change and rash  Neurological: Negative for dizziness and headaches  Psychiatric/Behavioral: Negative for agitation and behavioral problems  Objective:      /62   Pulse 69   Temp 98 2 °F (36 8 °C) (Tympanic)   Ht 5' 1" (1 549 m)   Wt 58 4 kg (128 lb 12 8 oz)   SpO2 100% Comment: on 2 liters 02  BMI 24 34 kg/m²          Physical Exam   Constitutional: She is oriented to person, place, and time  She appears well-developed and well-nourished  No distress  HENT:   Head: Normocephalic and atraumatic  Nose: Nose normal    Mouth/Throat: Oropharynx is clear and moist    Eyes: Pupils are equal, round, and reactive to light  Conjunctivae are normal    Cardiovascular: Normal rate, regular rhythm and normal heart sounds  Pulses are no weak pulses  No murmur heard  Pulses:       Dorsalis pedis pulses are 2+ on the right side, and 2+ on the left side  Posterior tibial pulses are 2+ on the right side, and 2+ on the left side  Pulmonary/Chest: Effort normal and breath sounds normal  No respiratory distress  She has no wheezes  Abdominal: Soft  Bowel sounds are normal  She exhibits no distension  There is no tenderness  Musculoskeletal: Normal range of motion  She exhibits tenderness  She exhibits no edema or deformity  Tenderness at the base of her right thumb  FROM of right thumb   Feet:   Right Foot:   Skin Integrity: Negative for ulcer, skin breakdown, erythema, warmth, callus or dry skin  Left Foot:   Skin Integrity: Negative for ulcer, skin breakdown, erythema, warmth, callus or dry skin  Neurological: She is alert and oriented to person, place, and time  Skin: Skin is warm and dry  No rash noted  She is not diaphoretic  No erythema  Psychiatric: She has a normal mood and affect  Patient's shoes and socks removed  Right Foot/Ankle   Right Foot Inspection  Skin Exam: skin normal and skin intact no dry skin, no warmth, no callus, no erythema, no maceration, no abnormal color, no pre-ulcer, no ulcer and no callus                          Toe Exam: ROM and strength within normal limitsno swelling, no tenderness, erythema and  no right toe deformity  Sensory   Vibration: intact  Proprioception: intact   Monofilament testing: intact  Vascular  Capillary refills: < 3 seconds  The right DP pulse is 2+  The right PT pulse is 2+       Left Foot/Ankle  Left Foot Inspection  Skin Exam: skin normal and skin intactno dry skin, no warmth, no erythema, no maceration, normal color, no pre-ulcer, no ulcer and no callus                         Toe Exam: ROM and strength within normal limitsno swelling, no tenderness, no erythema and no left toe deformity                   Sensory   Vibration: intact  Proprioception: intact  Monofilament: intact  Vascular  Capillary refills: < 3 seconds  The left DP pulse is 2+  The left PT pulse is 2+  Assign Risk Category:  No deformity present; No loss of protective sensation;  No weak pulses       Risk: 0

## 2019-03-12 ENCOUNTER — APPOINTMENT (OUTPATIENT)
Dept: RADIOLOGY | Facility: CLINIC | Age: 84
End: 2019-03-12
Payer: MEDICARE

## 2019-03-12 ENCOUNTER — APPOINTMENT (OUTPATIENT)
Dept: LAB | Facility: CLINIC | Age: 84
End: 2019-03-12
Payer: MEDICARE

## 2019-03-12 DIAGNOSIS — E87.1 HYPONATREMIA: ICD-10-CM

## 2019-03-12 DIAGNOSIS — Z13.220 NEED FOR LIPID SCREENING: ICD-10-CM

## 2019-03-12 DIAGNOSIS — E11.9 TYPE 2 DIABETES MELLITUS TREATED WITHOUT INSULIN (HCC): ICD-10-CM

## 2019-03-12 DIAGNOSIS — M79.644 PAIN OF RIGHT THUMB: ICD-10-CM

## 2019-03-12 LAB
ALBUMIN SERPL BCP-MCNC: 3.5 G/DL (ref 3.5–5)
ALP SERPL-CCNC: 58 U/L (ref 46–116)
ALT SERPL W P-5'-P-CCNC: 43 U/L (ref 12–78)
ANION GAP SERPL CALCULATED.3IONS-SCNC: 3 MMOL/L (ref 4–13)
AST SERPL W P-5'-P-CCNC: 25 U/L (ref 5–45)
BILIRUB SERPL-MCNC: 0.51 MG/DL (ref 0.2–1)
BUN SERPL-MCNC: 18 MG/DL (ref 5–25)
CALCIUM SERPL-MCNC: 8.3 MG/DL (ref 8.3–10.1)
CHLORIDE SERPL-SCNC: 96 MMOL/L (ref 100–108)
CHOLEST SERPL-MCNC: 157 MG/DL (ref 50–200)
CO2 SERPL-SCNC: 33 MMOL/L (ref 21–32)
CREAT SERPL-MCNC: 0.53 MG/DL (ref 0.6–1.3)
CREAT UR-MCNC: 54.4 MG/DL
EST. AVERAGE GLUCOSE BLD GHB EST-MCNC: 137 MG/DL
GFR SERPL CREATININE-BSD FRML MDRD: 89 ML/MIN/1.73SQ M
GLUCOSE P FAST SERPL-MCNC: 92 MG/DL (ref 65–99)
HBA1C MFR BLD: 6.4 % (ref 4.2–6.3)
HDLC SERPL-MCNC: 94 MG/DL (ref 40–60)
LDLC SERPL CALC-MCNC: 52 MG/DL (ref 0–100)
MICROALBUMIN UR-MCNC: 31.6 MG/L (ref 0–20)
MICROALBUMIN/CREAT 24H UR: 58 MG/G CREATININE (ref 0–30)
NONHDLC SERPL-MCNC: 63 MG/DL
POTASSIUM SERPL-SCNC: 3.6 MMOL/L (ref 3.5–5.3)
PROT SERPL-MCNC: 6.4 G/DL (ref 6.4–8.2)
SODIUM SERPL-SCNC: 132 MMOL/L (ref 136–145)
TRIGL SERPL-MCNC: 56 MG/DL

## 2019-03-12 PROCEDURE — 82570 ASSAY OF URINE CREATININE: CPT | Performed by: FAMILY MEDICINE

## 2019-03-12 PROCEDURE — 82043 UR ALBUMIN QUANTITATIVE: CPT | Performed by: FAMILY MEDICINE

## 2019-03-12 PROCEDURE — 36415 COLL VENOUS BLD VENIPUNCTURE: CPT

## 2019-03-12 PROCEDURE — 83036 HEMOGLOBIN GLYCOSYLATED A1C: CPT

## 2019-03-12 PROCEDURE — 80053 COMPREHEN METABOLIC PANEL: CPT

## 2019-03-12 PROCEDURE — 80061 LIPID PANEL: CPT

## 2019-03-12 PROCEDURE — 73130 X-RAY EXAM OF HAND: CPT

## 2019-03-29 DIAGNOSIS — E78.2 MIXED HYPERLIPIDEMIA: ICD-10-CM

## 2019-03-29 RX ORDER — ATORVASTATIN CALCIUM 80 MG/1
80 TABLET, FILM COATED ORAL DAILY
Qty: 90 TABLET | Refills: 3 | Status: SHIPPED | OUTPATIENT
Start: 2019-03-29 | End: 2020-05-11 | Stop reason: SDUPTHER

## 2019-04-08 DIAGNOSIS — Z91.09 ENVIRONMENTAL ALLERGIES: ICD-10-CM

## 2019-04-08 RX ORDER — MONTELUKAST SODIUM 10 MG/1
10 TABLET ORAL
Qty: 90 TABLET | Refills: 0 | Status: SHIPPED | OUTPATIENT
Start: 2019-04-08 | End: 2019-08-17 | Stop reason: SDUPTHER

## 2019-06-04 ENCOUNTER — OFFICE VISIT (OUTPATIENT)
Dept: FAMILY MEDICINE CLINIC | Facility: CLINIC | Age: 84
End: 2019-06-04
Payer: MEDICARE

## 2019-06-04 VITALS
WEIGHT: 128.2 LBS | OXYGEN SATURATION: 100 % | DIASTOLIC BLOOD PRESSURE: 70 MMHG | SYSTOLIC BLOOD PRESSURE: 124 MMHG | BODY MASS INDEX: 24.2 KG/M2 | TEMPERATURE: 98 F | HEART RATE: 70 BPM | HEIGHT: 61 IN

## 2019-06-04 DIAGNOSIS — J43.9 PULMONARY EMPHYSEMA, UNSPECIFIED EMPHYSEMA TYPE (HCC): ICD-10-CM

## 2019-06-04 DIAGNOSIS — R06.00 DYSPNEA ON EXERTION: Primary | ICD-10-CM

## 2019-06-04 PROCEDURE — 99214 OFFICE O/P EST MOD 30 MIN: CPT | Performed by: FAMILY MEDICINE

## 2019-07-09 DIAGNOSIS — I10 ESSENTIAL HYPERTENSION: ICD-10-CM

## 2019-07-09 RX ORDER — LISINOPRIL 2.5 MG/1
TABLET ORAL
Qty: 30 TABLET | Refills: 5 | Status: SHIPPED | OUTPATIENT
Start: 2019-07-09 | End: 2020-01-14

## 2019-08-02 ENCOUNTER — TELEPHONE (OUTPATIENT)
Dept: FAMILY MEDICINE CLINIC | Facility: CLINIC | Age: 84
End: 2019-08-02

## 2019-08-02 DIAGNOSIS — I50.32 CHRONIC DIASTOLIC CHF (CONGESTIVE HEART FAILURE) (HCC): ICD-10-CM

## 2019-08-02 DIAGNOSIS — J43.9 PULMONARY EMPHYSEMA, UNSPECIFIED EMPHYSEMA TYPE (HCC): ICD-10-CM

## 2019-08-02 DIAGNOSIS — R06.00 DYSPNEA ON EXERTION: Primary | ICD-10-CM

## 2019-08-02 RX ORDER — NEBULIZER ACCESSORIES
KIT MISCELLANEOUS EVERY 4 HOURS PRN
Qty: 1 EACH | Refills: 1 | Status: SHIPPED | OUTPATIENT
Start: 2019-08-02

## 2019-08-02 NOTE — TELEPHONE ENCOUNTER
Daniela nebulizer machine broke so she needs a new script faxed to [facility]   [phone] chong

## 2019-08-07 ENCOUNTER — TRANSCRIBE ORDERS (OUTPATIENT)
Dept: PHYSICAL THERAPY | Age: 84
End: 2019-08-07

## 2019-08-07 ENCOUNTER — EVALUATION (OUTPATIENT)
Dept: PHYSICAL THERAPY | Age: 84
End: 2019-08-07
Payer: MEDICARE

## 2019-08-07 DIAGNOSIS — R53.81 DEBILITY: ICD-10-CM

## 2019-08-07 DIAGNOSIS — Z87.81 S/P RIGHT HIP FRACTURE: Primary | ICD-10-CM

## 2019-08-07 DIAGNOSIS — Z87.81 HEALED FRACTURES: Primary | ICD-10-CM

## 2019-08-07 PROCEDURE — 97163 PT EVAL HIGH COMPLEX 45 MIN: CPT | Performed by: PHYSICAL THERAPIST

## 2019-08-07 PROCEDURE — 97110 THERAPEUTIC EXERCISES: CPT | Performed by: PHYSICAL THERAPIST

## 2019-08-07 NOTE — LETTER
2019    Kelsey Baker MD  [address]    Patient: Jose Juan Decker   YOB: 1935   Date of Visit: 2019     Encounter Diagnosis     ICD-10-CM    1  S/P right hip fracture Z87 81    2  Debility R53 81        Dear Dr KOROMA [facility]: Thank you for your recent referral of Jose Juan Decker  Please review the attached evaluation summary from Daniela's recent visit  Please verify that you agree with the plan of care by signing the attached order  If you have any questions or concerns, please do not hesitate to call  I sincerely appreciate the opportunity to share in the care of one of your patients and hope to have another opportunity to work with you in the near future  Sincerely,    Cindy Randall, PT      Referring Provider:      I certify that I have read the below Plan of Care and certify the need for these services furnished under this plan of treatment while under my care  Kelsey Baker MD  [address] [place] [address]  VIA Facsimile: [phone]          PT Evaluation     Today's date: 2019  Patient name: Jose Juan Decker  : 1935  MRN: [id number]  Referring provider: Elan Whitehead  Dx:   Encounter Diagnosis     ICD-10-CM    1  S/P right hip fracture Z87 81    2  Debility R53 81                   Assessment  Assessment details: Jose Juan Decker is a 82 y o  female who presents with right hip pain, decreased right LE strength, decreased A/PROM of the right hip, ambulatory dysfunction, poor balance and decreased cardiovascular endurance  Due to these impairments, Patient has difficulty performing a/iadls and engaging in social activities  Patient's clinical presentation is consistent with the referring diagnosis of s/p right hip fracture with ORIF   Patient would benefit from skilled physical therapy to address her aforementioned impairments, improve her level of function and to improve her overall quality of life  Impairments: abnormal gait, abnormal or restricted ROM, activity intolerance, impaired balance, impaired physical strength, lacks appropriate home exercise program, pain with function and safety issue  Functional limitations: decreased walking tolerance, difficulty with sit to stand transfers, not driving, fall riskUnderstanding of Dx/Px/POC: good   Prognosis: good    Goals  ST-3 WEEKS  1  Decrease pain to <5/10 at its worst   2   Increase hip flexion, ABD ROM by > 5 deg  3   Sit to stand transfers performed with proper technique and independent from all surfaces  LT-6 WEEKS  1  Patient to be independent with a/iadls  2  Increase functional activities for leisure and home activities to previous LOF  3  Independent with HEP  4  Pt able to walk in grocery store with use of RW or shipping cart  5  Pt will demonstrate increased safety awareness to prevent falls  Plan  Patient would benefit from: skilled physical therapy  Planned modality interventions: cryotherapy and thermotherapy: hydrocollator packs  Planned therapy interventions: functional ROM exercises, home exercise program, manual therapy, neuromuscular re-education, patient education, postural training, strengthening, stretching, therapeutic activities, therapeutic exercise, balance, gait training and transfer training  Frequency: 2x week  Duration in weeks: 12  Plan of Care beginning date: 2019  Plan of Care expiration date: 2019  Treatment plan discussed with: patient        Subjective Evaluation    History of Present Illness  Date of onset: 2019  Date of surgery: 7/15/2019  Mechanism of injury: Pt was baking cookies and turned to get the cookies out of the oven and rotated her hip and fell  Pt was taken to the hospital via ambulance  She had a fractured right hip and underwent ORIF  Pt was hospitalized 3 days and then transferred to rehab until d/c to home   She is now referred for out patient physical therapy  Pt has had 2 other significant falls which caused a fractured pelvis and a spinal fracture  Pain  Current pain ratin  At best pain ratin  At worst pain ratin  Location: right hip/groin  Quality: dull ache and sharp  Relieving factors: ice, relaxation and rest  Aggravating factors: standing and walking    Social Support  Steps to enter house: no  Stairs in house: yes (pt uses chair lift)   Lives in: multiple-level home    Treatments  Discharged from (in last 30 days): inpatient hospitalization  Discharged from (in last 30 days) comments: inpatient rehab  Patient Goals  Patient goals for therapy: decreased pain, increased strength, independence with ADLs/IADLs and improved balance  Patient goal: pt to be able to walk        Objective     Observations     Right Hip  Positive for incision  Additional Observation Details  Resolving ecchymosis in right leg  Incisions are close, well approximated  No tenderness   Mild tautness of scar, dry skin    Tenderness     Additional Tenderness Details  No tenderness to palpation    Neurological Testing     Sensation     Hip   Left Hip   Intact: light touch    Right Hip   Intact: light touch    Additional Neurological Details  No neurological deficits    Active Range of Motion   Left Hip   Flexion: 105 degrees   Abduction: 30 degrees     Right Hip   Flexion: 90 degrees   Abduction: 20 degrees     Strength/Myotome Testing     Left Hip   Normal muscle strength    Right Hip   Planes of Motion   Flexion: 3  Abduction: 3+  Adduction: 4    Left Knee   Normal strength    Right Knee   Flexion: 4  Extension: 4    Left Ankle/Foot   Normal strength    Right Ankle/Foot   Dorsiflexion: 5  Plantar flexion: 4+    Ambulation   Weight-Bearing Status   Weight-Bearing Status (Right): weight-bearing as tolerated    Assistive device used: wheeled walker    Ambulation: Level Surfaces   Ambulation with assistive device: independent  Ambulation without assistive device: unsafe  Ambulation: Stairs   Ascend stairs: unable  Descend stairs: unable    Observational Gait   Gait: antalgic   Decreased walking speed, stride length and right stance time  Functional Assessment        Comments  Pt uses supplemental O2 of 2-3L at all times    Pt able to bathe and dress self but does have difficulty with toilet transfers  We did discuss her purchasing a raised toilet seat  Pt to consider  Pt needs assistance with sit to stand transfer from low or soft surface  Pt not driving but did drive prior to injury  Pt not performing iadls as prior  Pt did cook and bake and clean      Balance in standing without AD: poor  Seated balance unsupported: good             Precautions: Fall risk, hip ORIF      Manual  8/7            Right hip NV                                                                    Exercise Diary  8/7            heelslides nv            Ball ADD 30x            tband ABD 30x blue            marches (sit) 30x            LAQ 2#/ 30            Heel raises nv            Slant board 30"x4            Nustep 4'                                                                                                                                                                                         Modalities

## 2019-08-07 NOTE — PROGRESS NOTES
PT Evaluation     Today's date: 2019  Patient name: Rolanda Vasquez  : 1935  MRN: [id number]  Referring provider: Satnam Sexton  Dx:   Encounter Diagnosis     ICD-10-CM    1  S/P right hip fracture Z87 81    2  Debility R53 81                   Assessment  Assessment details: Rolanda Vasquez is a 82 y o  female who presents with right hip pain, decreased right LE strength, decreased A/PROM of the right hip, ambulatory dysfunction, poor balance and decreased cardiovascular endurance  Due to these impairments, Patient has difficulty performing a/iadls and engaging in social activities  Patient's clinical presentation is consistent with the referring diagnosis of s/p right hip fracture with ORIF  Patient would benefit from skilled physical therapy to address her aforementioned impairments, improve her level of function and to improve her overall quality of life  Impairments: abnormal gait, abnormal or restricted ROM, activity intolerance, impaired balance, impaired physical strength, lacks appropriate home exercise program, pain with function and safety issue  Functional limitations: decreased walking tolerance, difficulty with sit to stand transfers, not driving, fall riskUnderstanding of Dx/Px/POC: good   Prognosis: good    Goals  ST-3 WEEKS  1  Decrease pain to <5/10 at its worst   2   Increase hip flexion, ABD ROM by > 5 deg  3   Sit to stand transfers performed with proper technique and independent from all surfaces  LT-6 WEEKS  1  Patient to be independent with a/iadls  2  Increase functional activities for leisure and home activities to previous LOF  3  Independent with HEP  4  Pt able to walk in grocery store with use of RW or shipping cart  5  Pt will demonstrate increased safety awareness to prevent falls      Plan  Patient would benefit from: skilled physical therapy  Planned modality interventions: cryotherapy and thermotherapy: hydrocollator packs  Planned therapy interventions: functional ROM exercises, home exercise program, manual therapy, neuromuscular re-education, patient education, postural training, strengthening, stretching, therapeutic activities, therapeutic exercise, balance, gait training and transfer training  Frequency: 2x week  Duration in weeks: 12  Plan of Care beginning date: 2019  Plan of Care expiration date: 2019  Treatment plan discussed with: patient        Subjective Evaluation    History of Present Illness  Date of onset: 2019  Date of surgery: 7/15/2019  Mechanism of injury: Pt was baking cookies and turned to get the cookies out of the oven and rotated her hip and fell  Pt was taken to the hospital via ambulance  She had a fractured right hip and underwent ORIF  Pt was hospitalized 3 days and then transferred to rehab until d/c to home  She is now referred for out patient physical therapy  Pt has had 2 other significant falls which caused a fractured pelvis and a spinal fracture  Pain  Current pain ratin  At best pain ratin  At worst pain ratin  Location: right hip/groin  Quality: dull ache and sharp  Relieving factors: ice, relaxation and rest  Aggravating factors: standing and walking    Social Support  Steps to enter house: no  Stairs in house: yes (pt uses chair lift)   Lives in: multiple-level home    Treatments  Discharged from (in last 30 days): inpatient hospitalization  Discharged from (in last 30 days) comments: inpatient rehab  Patient Goals  Patient goals for therapy: decreased pain, increased strength, independence with ADLs/IADLs and improved balance  Patient goal: pt to be able to walk        Objective     Observations     Right Hip  Positive for incision  Additional Observation Details  Resolving ecchymosis in right leg  Incisions are close, well approximated  No tenderness   Mild tautness of scar, dry skin    Tenderness     Additional Tenderness Details  No tenderness to palpation    Neurological Testing Sensation     Hip   Left Hip   Intact: light touch    Right Hip   Intact: light touch    Additional Neurological Details  No neurological deficits    Active Range of Motion   Left Hip   Flexion: 105 degrees   Abduction: 30 degrees     Right Hip   Flexion: 90 degrees   Abduction: 20 degrees     Strength/Myotome Testing     Left Hip   Normal muscle strength    Right Hip   Planes of Motion   Flexion: 3  Abduction: 3+  Adduction: 4    Left Knee   Normal strength    Right Knee   Flexion: 4  Extension: 4    Left Ankle/Foot   Normal strength    Right Ankle/Foot   Dorsiflexion: 5  Plantar flexion: 4+    Ambulation   Weight-Bearing Status   Weight-Bearing Status (Right): weight-bearing as tolerated    Assistive device used: wheeled walker    Ambulation: Level Surfaces   Ambulation with assistive device: independent  Ambulation without assistive device: unsafe  Ambulation: Stairs   Ascend stairs: unable  Descend stairs: unable    Observational Gait   Gait: antalgic   Decreased walking speed, stride length and right stance time  Functional Assessment        Comments  Pt uses supplemental O2 of 2-3L at all times    Pt able to bathe and dress self but does have difficulty with toilet transfers  We did discuss her purchasing a raised toilet seat  Pt to consider  Pt needs assistance with sit to stand transfer from low or soft surface  Pt not driving but did drive prior to injury  Pt not performing iadls as prior  Pt did cook and bake and clean      Balance in standing without AD: poor  Seated balance unsupported: good             Precautions: Fall risk, hip ORIF      Manual  8/7            Right hip NV                                                                    Exercise Diary  8/7            heelslides nv            Ball ADD 30x            tband ABD 30x blue            marches (sit) 30x            LAQ 2#/ 30            Heel raises nv            Slant board 30"x4            Nustep 4' Modalities

## 2019-08-12 ENCOUNTER — OFFICE VISIT (OUTPATIENT)
Dept: PHYSICAL THERAPY | Age: 84
End: 2019-08-12
Payer: MEDICARE

## 2019-08-12 DIAGNOSIS — R53.81 DEBILITY: ICD-10-CM

## 2019-08-12 DIAGNOSIS — Z87.81 S/P RIGHT HIP FRACTURE: Primary | ICD-10-CM

## 2019-08-12 PROCEDURE — 97140 MANUAL THERAPY 1/> REGIONS: CPT

## 2019-08-12 PROCEDURE — 97110 THERAPEUTIC EXERCISES: CPT

## 2019-08-12 NOTE — PROGRESS NOTES
Daily Note     Today's date: 2019  Patient name: Ron Porter  : 1935  MRN: [id number]  Referring provider: Leilani Lee MD  Dx:   Encounter Diagnosis     ICD-10-CM    1  S/P right hip fracture Z87 81    2  Debility R53 81        Start Time: 930  Stop Time: 1025  Total time in clinic (min): 55 minutes    Subjective: Patient notes a little sore after her initial therapy session      Objective: See treatment diary below      Assessment: Tolerated treatment fairly well, guarded during manual therapy but was able to relax into a stretch  Patient motivated with therapy  Patient exhibited good technique with therapeutic exercises and would benefit from continued PT      Plan: Continue per plan of care        Precautions: Fall risk, hip ORIF      Manual             Right hip NV 10                                                                   Exercise Diary             heelslides nv 20x           Ball ADD 30x 30x           tband ABD 30x blue 30x           marches (sit) 30x 30x           SAQ  2#/30           LAQ 2#/ 30 2# 30x           Heel raises nv 30x           Slant board 30"x4 30"x4           Nustep 4' 5'                                                                                                                                                                                        Modalities

## 2019-08-14 ENCOUNTER — OFFICE VISIT (OUTPATIENT)
Dept: PHYSICAL THERAPY | Age: 84
End: 2019-08-14
Payer: MEDICARE

## 2019-08-14 DIAGNOSIS — R53.81 DEBILITY: ICD-10-CM

## 2019-08-14 DIAGNOSIS — Z87.81 S/P RIGHT HIP FRACTURE: Primary | ICD-10-CM

## 2019-08-14 PROCEDURE — 97110 THERAPEUTIC EXERCISES: CPT

## 2019-08-14 PROCEDURE — 97140 MANUAL THERAPY 1/> REGIONS: CPT

## 2019-08-14 NOTE — PROGRESS NOTES
Daily Note     Today's date: 2019  Patient name: Marissa Najera  : 1935  MRN: [id number]  Referring provider: Yandel De La Rosa MD  Dx:   Encounter Diagnosis     ICD-10-CM    1  S/P right hip fracture Z87 81    2  Debility R53 81        Start Time: 1000  Stop Time: 1045  Total time in clinic (min): 45 minutes    Subjective: patient reports increased soreness and stiffness today  Objective: See treatment diary below      Assessment: Tolerated treatment fairly well, increased reps today with good tolerance    Patient exhibited good technique with therapeutic exercises and would benefit from continued PT      Plan: Continue per plan of care        Precautions: Fall risk, hip ORIF      Manual            Right hip NV 10 10                                                                  Exercise Diary            heelslides nv 20x 30x          Ball ADD 30x 30x 30x          tband ABD 30x blue 30x 30x          marches (sit) 30x 30x 30x          SAQ  2#/30 2#30x          LAQ 2#/ 30 2# 30x 2#30x          Heel raises nv 30x 30x          Slant board 30"x4 30"x4 30"x4          Nustep 4' 5' 6'                                                                                                                                                                                       Modalities

## 2019-08-16 NOTE — PROGRESS NOTES
Daily Note     Today's date: 2019  Patient name: Nick Ricks  : 1935  MRN: [id number]  Referring provider: Cr Ryder MD  Dx:   Encounter Diagnosis     ICD-10-CM    1  S/P right hip fracture Z87 81    2  Debility R53 81        Start Time: 174  Stop Time: 1120  Total time in clinic (min): 45 minutes    Subjective: Patient c/o increased fatigue and increased soreness in the right hip/groin area  Objective: See treatment diary below      Assessment: Tolerated treatment fairly well, monitored vitals throughout tx due to patient c/o sob, increased O2 to 3L and felt better, O2 fluctuated from 94-97% on 3L o2  Tolerated standing hip abd and increased in time on nustep  Patient demonstrated fatigue post treatment and would benefit from continued PT      Plan: Continue per plan of care        Precautions: Fall risk, hip ORIF      Manual           Right hip NV 10 10 10                                                                 Exercise Diary           heelslides nv 20x 30x 30x         Ball ADD 30x 30x 30x 30x         tband ABD 30x blue 30x 30x 30x         marches (sit) 30x 30x 30x 30x         SAQ  2#/30 2#30x 2/30         LAQ 2#/ 30 2# 30x 2#30x 2/30         Heel raises nv 30x 30x 30x         Slant board 30"x4 30"x4 30"x4 30"x4         Nustep 4' 5' 6' 7         Stand abd     30x B                                                                                                                                                                         Modalities

## 2019-08-17 DIAGNOSIS — Z91.09 ENVIRONMENTAL ALLERGIES: ICD-10-CM

## 2019-08-18 RX ORDER — MONTELUKAST SODIUM 10 MG/1
TABLET ORAL
Qty: 90 TABLET | Refills: 0 | Status: SHIPPED | OUTPATIENT
Start: 2019-08-18 | End: 2019-11-25

## 2019-08-19 ENCOUNTER — OFFICE VISIT (OUTPATIENT)
Dept: PHYSICAL THERAPY | Age: 84
End: 2019-08-19
Payer: MEDICARE

## 2019-08-19 DIAGNOSIS — Z87.81 S/P RIGHT HIP FRACTURE: Primary | ICD-10-CM

## 2019-08-19 DIAGNOSIS — R53.81 DEBILITY: ICD-10-CM

## 2019-08-19 PROCEDURE — 97140 MANUAL THERAPY 1/> REGIONS: CPT

## 2019-08-19 PROCEDURE — 97110 THERAPEUTIC EXERCISES: CPT

## 2019-08-21 ENCOUNTER — OFFICE VISIT (OUTPATIENT)
Dept: PHYSICAL THERAPY | Age: 84
End: 2019-08-21
Payer: MEDICARE

## 2019-08-21 DIAGNOSIS — R53.81 DEBILITY: Primary | ICD-10-CM

## 2019-08-21 DIAGNOSIS — Z87.81 S/P RIGHT HIP FRACTURE: ICD-10-CM

## 2019-08-21 PROCEDURE — 97140 MANUAL THERAPY 1/> REGIONS: CPT

## 2019-08-21 PROCEDURE — 97110 THERAPEUTIC EXERCISES: CPT

## 2019-08-21 NOTE — PROGRESS NOTES
Daily Note     Today's date: 2019  Patient name: Ever Comment  : 1935  MRN: [id number]  Referring provider: Alan Silva MD  Dx:   Encounter Diagnosis     ICD-10-CM    1  Debility R53 81    2  S/P right hip fracture Z87 81        Start Time: 1020  Stop Time: 1110  Total time in clinic (min): 50 minutes    Subjective: Patient reports about 5/10 right  Medial thigh pain  Notes the weather isn't helping  Objective: See treatment diary below      Assessment: Tolerated treatment well, increased o2 consentrator to 3L due patient having SOB, o2 fluctuated [id number]%    Patient demonstrated fatigue post treatment and would benefit from continued PT      Plan: Continue per plan of care        Precautions: Fall risk, hip ORIF      Manual          Right hip NV 10 10 10 10                                                                Exercise Diary          heelslides nv 20x 30x 30x 30x        Ball ADD 30x 30x 30x 30x 30x        tband ABD 30x blue 30x 30x 30x 30x        marches (sit) 30x 30x 30x 30x 30x        SAQ  2#/30 2#30x 2/30 2/30        LAQ 2#/ 30 2# 30x 2#30x 2/30 2/30        Heel raises nv 30x 30x 30x 30x        Slant board 30"x4 30"x4 30"x4 30"x4 30"x4        Nustep 4' 5' 6' 7 7        Stand abd     30x B 30xB                                                                                                                                                                        Modalities

## 2019-08-24 DIAGNOSIS — Z91.09 ENVIRONMENTAL ALLERGIES: ICD-10-CM

## 2019-08-25 RX ORDER — MONTELUKAST SODIUM 10 MG/1
TABLET ORAL
Qty: 90 TABLET | Refills: 0 | Status: SHIPPED | OUTPATIENT
Start: 2019-08-25 | End: 2019-11-25

## 2019-08-26 ENCOUNTER — OFFICE VISIT (OUTPATIENT)
Dept: PHYSICAL THERAPY | Age: 84
End: 2019-08-26
Payer: MEDICARE

## 2019-08-26 DIAGNOSIS — Z87.81 S/P RIGHT HIP FRACTURE: ICD-10-CM

## 2019-08-26 DIAGNOSIS — R53.81 DEBILITY: Primary | ICD-10-CM

## 2019-08-26 PROCEDURE — 97140 MANUAL THERAPY 1/> REGIONS: CPT

## 2019-08-26 PROCEDURE — 97110 THERAPEUTIC EXERCISES: CPT

## 2019-08-26 NOTE — PROGRESS NOTES
Daily Note     Today's date: 2019  Patient name: Jamia Pope  : 1935  MRN: [id number]  Referring provider: Susie Montelongo MD  Dx:   Encounter Diagnosis     ICD-10-CM    1  Debility R53 81    2  S/P right hip fracture Z87 81        Start Time: 1340  Stop Time: 1430  Total time in clinic (min): 50 minutes    Subjective: Patient reports increased soreness and stiffness today, but no too bad  Objective: See treatment diary below      Assessment: Tolerated treatment well, increased nustep time today due to patient reports feeling better after manual therapy  Patient demonstrated fatigue post treatment and would benefit from continued PT      Plan: Continue per plan of care        Precautions: Fall risk, hip ORIF      Manual         Right hip NV 10 10 10 10 10                                                               Exercise Diary         heelslides nv 20x 30x 30x 30x 30x       Ball ADD 30x 30x 30x 30x 30x 30x       tband ABD 30x blue 30x 30x 30x 30x 30x       marches (sit) 30x 30x 30x 30x 30x 30x       SAQ  2#/30 2#30x 2/30 2/30 2/30       LAQ 2#/ 30 2# 30x 2#30x 2/30 2/30 2/30       Heel raises nv 30x 30x 30x 30x 30x       Slant board 30"x4 30"x4 30"x4 30"x4 30"x4 30"x4       Nustep 4' 5' 6' 7 7 14'       Stand abd     30x B 30xB 30x b                                                                                                                                                                       Modalities

## 2019-08-28 ENCOUNTER — OFFICE VISIT (OUTPATIENT)
Dept: PHYSICAL THERAPY | Age: 84
End: 2019-08-28
Payer: MEDICARE

## 2019-08-28 DIAGNOSIS — Z87.81 S/P RIGHT HIP FRACTURE: ICD-10-CM

## 2019-08-28 DIAGNOSIS — R53.81 DEBILITY: Primary | ICD-10-CM

## 2019-08-28 PROCEDURE — 97110 THERAPEUTIC EXERCISES: CPT | Performed by: PHYSICAL THERAPIST

## 2019-08-28 NOTE — PROGRESS NOTES
Daily Note     Today's date: 2019  Patient name: Joan Arias  : 1935  MRN: [id number]  Referring provider: Michael Mcleod MD  Dx:   Encounter Diagnosis     ICD-10-CM    1  Debility R53 81    2  S/P right hip fracture Z87 81        Start Time:   Stop Time: 104  Total time in clinic (min): 50 minutes    Subjective: Pt reports her hip is painful "when trying to move", 4/10  Pt continues to walk with RW and notes she feels safe with walker  Objective: See treatment diary below      Assessment: Tolerated treatment well  Patient was dterined to use Nustep for 13' as she had done in the hospital  Today she achieved her personal goal and had no increased SOB  Improved hip AROM but does continue to have pain in groin with WB activities  Plan: Continue per plan of care        Precautions: Fall risk, hip ORIF      Manual        Right hip NV 10 10 10 10 10 10                                                              Exercise Diary        heelslides nv 20x 30x 30x 30x 30x 30x      Ball ADD 30x 30x 30x 30x 30x 30x 30x      tband ABD 30x blue 30x 30x 30x 30x 30x 30x      marches (sit) 30x 30x 30x 30x 30x 30x 30x      SAQ  2#/30 2#30x 2/30 2/30 2/30 3#/30      LAQ 2#/ 30 2# 30x 2#30x 2/30 2/30 2/30 3#/30      Heel raises nv 30x 30x 30x 30x 30x 30x      Slant board 30"x4 30"x4 30"x4 30"x4 30"x4 30"x4 30"x4      Nustep 4' 5' 6' 7 7 14' 15'      Stand abd     30x B 30xB 30x b 30x                                                                                                                                                                      Modalities

## 2019-09-04 ENCOUNTER — OFFICE VISIT (OUTPATIENT)
Dept: PHYSICAL THERAPY | Age: 84
End: 2019-09-04
Payer: MEDICARE

## 2019-09-04 DIAGNOSIS — Z87.81 S/P RIGHT HIP FRACTURE: ICD-10-CM

## 2019-09-04 DIAGNOSIS — R53.81 DEBILITY: Primary | ICD-10-CM

## 2019-09-04 PROCEDURE — 97110 THERAPEUTIC EXERCISES: CPT | Performed by: PHYSICAL THERAPIST

## 2019-09-04 NOTE — PROGRESS NOTES
Daily Note     Today's date: 2019  Patient name: Leopoldo Pacheco  : 1935  MRN: [id number]  Referring provider: Pedro Thomas MD  Dx:   Encounter Diagnosis     ICD-10-CM    1  Debility R53 81    2  S/P right hip fracture Z87 81        Start Time: 164  Stop Time: 1033  Total time in clinic (min): 50 minutes    Subjective: Pt continues to report pain with WB  She is comfortable with RW       Objective: See treatment diary below      Assessment: Tolerated treatment well  Patient demonstrates equal WB with walker and walks with a good pace  Pt feels more comfortable with RW  Plan: Continue per plan of care        Precautions: Fall risk, hip ORIF      Manual   9     Right hip NV 10 10 10 10 10 10 10                                                             Exercise Diary   94     heelslides nv 20x 30x 30x 30x 30x 30x 30x     Ball ADD 30x 30x 30x 30x 30x 30x 30x 30x     tband ABD 30x blue 30x 30x 30x 30x 30x 30x 30x     marches (sit) 30x 30x 30x 30x 30x 30x 30x 30x     SAQ  2#/30 2#30x 2/30 2/30 2/30 3#/30 3#/ 30     LAQ 2#/ 30 2# 30x 2#30x 2/30 2/30 2/30 3#/30 3#/ 30     Heel raises nv 30x 30x 30x 30x 30x 30x 30x     Slant board 30"x4 30"x4 30"x4 30"x4 30"x4 30"x4 30"x4 30"x4     Nustep 4' 5' 6' 7 7 14' 15' 15'     Stand abd     30x B 30xB 30x b 30x 30x                                                                                                                                                                     Modalities

## 2019-09-09 ENCOUNTER — APPOINTMENT (OUTPATIENT)
Dept: PHYSICAL THERAPY | Age: 84
End: 2019-09-09
Payer: MEDICARE

## 2019-09-09 DIAGNOSIS — I10 ESSENTIAL HYPERTENSION: ICD-10-CM

## 2019-09-09 RX ORDER — CARVEDILOL 3.12 MG/1
TABLET ORAL
Qty: 120 TABLET | Refills: 5 | Status: SHIPPED | OUTPATIENT
Start: 2019-09-09 | End: 2020-09-10

## 2019-09-11 ENCOUNTER — APPOINTMENT (OUTPATIENT)
Dept: PHYSICAL THERAPY | Age: 84
End: 2019-09-11
Payer: MEDICARE

## 2019-11-25 DIAGNOSIS — Z91.09 ENVIRONMENTAL ALLERGIES: ICD-10-CM

## 2019-11-25 RX ORDER — MONTELUKAST SODIUM 10 MG/1
10 TABLET ORAL
Qty: 90 TABLET | Refills: 1 | Status: SHIPPED | OUTPATIENT
Start: 2019-11-25 | End: 2020-05-15

## 2019-12-04 ENCOUNTER — OFFICE VISIT (OUTPATIENT)
Dept: FAMILY MEDICINE CLINIC | Facility: CLINIC | Age: 84
End: 2019-12-04
Payer: MEDICARE

## 2019-12-04 VITALS
SYSTOLIC BLOOD PRESSURE: 98 MMHG | BODY MASS INDEX: 23.18 KG/M2 | WEIGHT: 122.8 LBS | HEIGHT: 61 IN | HEART RATE: 76 BPM | DIASTOLIC BLOOD PRESSURE: 60 MMHG | TEMPERATURE: 98.9 F | OXYGEN SATURATION: 100 %

## 2019-12-04 DIAGNOSIS — E87.1 HYPONATREMIA: ICD-10-CM

## 2019-12-04 DIAGNOSIS — R73.01 IMPAIRED FASTING GLUCOSE: Primary | ICD-10-CM

## 2019-12-04 DIAGNOSIS — I95.9 HYPOTENSION, UNSPECIFIED HYPOTENSION TYPE: ICD-10-CM

## 2019-12-04 DIAGNOSIS — Z23 NEED FOR PNEUMOCOCCAL VACCINATION: ICD-10-CM

## 2019-12-04 DIAGNOSIS — Z23 NEED FOR INFLUENZA VACCINATION: ICD-10-CM

## 2019-12-04 DIAGNOSIS — R53.83 FATIGUE, UNSPECIFIED TYPE: ICD-10-CM

## 2019-12-04 DIAGNOSIS — E11.9 TYPE 2 DIABETES MELLITUS TREATED WITHOUT INSULIN (HCC): ICD-10-CM

## 2019-12-04 DIAGNOSIS — R26.2 AMBULATORY DYSFUNCTION: ICD-10-CM

## 2019-12-04 DIAGNOSIS — Z00.00 MEDICARE ANNUAL WELLNESS VISIT, SUBSEQUENT: ICD-10-CM

## 2019-12-04 LAB — SL AMB POCT HEMOGLOBIN AIC: 5.3 (ref ?–6.5)

## 2019-12-04 PROCEDURE — 99213 OFFICE O/P EST LOW 20 MIN: CPT | Performed by: FAMILY MEDICINE

## 2019-12-04 PROCEDURE — 90662 IIV NO PRSV INCREASED AG IM: CPT | Performed by: FAMILY MEDICINE

## 2019-12-04 PROCEDURE — G0009 ADMIN PNEUMOCOCCAL VACCINE: HCPCS | Performed by: FAMILY MEDICINE

## 2019-12-04 PROCEDURE — G0439 PPPS, SUBSEQ VISIT: HCPCS | Performed by: FAMILY MEDICINE

## 2019-12-04 PROCEDURE — 83036 HEMOGLOBIN GLYCOSYLATED A1C: CPT | Performed by: FAMILY MEDICINE

## 2019-12-04 PROCEDURE — G0008 ADMIN INFLUENZA VIRUS VAC: HCPCS | Performed by: FAMILY MEDICINE

## 2019-12-04 PROCEDURE — 90670 PCV13 VACCINE IM: CPT | Performed by: FAMILY MEDICINE

## 2019-12-04 RX ORDER — CLOPIDOGREL BISULFATE 75 MG/1
75 TABLET ORAL DAILY
COMMUNITY
Start: 2019-11-14 | End: 2022-05-10 | Stop reason: SDUPTHER

## 2019-12-04 RX ORDER — LEVALBUTEROL TARTRATE 45 UG/1
2 AEROSOL, METERED ORAL EVERY 4 HOURS PRN
COMMUNITY
Start: 2019-10-31 | End: 2020-04-22

## 2019-12-04 NOTE — PROGRESS NOTES
Assessment and Plan:     Problem List Items Addressed This Visit     None           Preventive health issues were discussed with patient, and age appropriate screening tests were ordered as noted in patient's After Visit Summary  Personalized health advice and appropriate referrals for health education or preventive services given if needed, as noted in patient's After Visit Summary       History of Present Illness:     Patient presents for Medicare Annual Wellness visit    Patient Care Team:  Tevin Haro MD as PCP - General (Family Medicine)  Franky Bloch, MD Justine Burden,      Problem List:     Patient Active Problem List   Diagnosis    Fall    Physical deconditioning    Incontinence of urine in female    Pain    Pain in scapula    Pain in back    Local infection of skin and subcutaneous tissue    [facility]STEM[facility]non-ST elevated [facility] infarction) [facility]    Type 2 diabetes mellitus treated without insulin ([facility] )    Abnormal TSH    Chronic systolic dysfunction of left ventricle    Pulmonary emphysema (HCC)    Anemia    Screening for osteoporosis    Dyspnea on exertion    Medicare annual wellness visit, subsequent    Need for pneumococcal vaccination    Nasal congestion    Menopause ovarian failure    Hyponatremia    Chronic diastolic CHF [facility]congestive heart failure) [facility]    Pain of right thumb    Need for lipid screening      Past Medical and Surgical History:     Past Medical History:   Diagnosis Date    Cardiac disease     [facility]congestive heart failure) ([facility]     Diabetes mellitus ([facility]     Emphysema lung ([facility]     History of fall     Hyperlipidemia     Hypertension     Incontinence in female     Pulmonary emphysema [facility]     Wound infection after surgery      Past Surgical History:   Procedure Laterality Date    APPENDECTOMY       SECTION      INCISION AND DRAINAGE POSTERIOR SPINE N/A 2017    Procedure: THORACIC WOUND EXPLORATION, WASHOUT, DEBRIDEMENT and CLOSURE;  Surgeon: Dorian Grayson MD;  [facility];  Service: Orthopedics    LUMBAR FUSION N/A 4/14/2017    Procedure: T3-T8 Posterior Spinal Fusion;  Surgeon: Dorian Grayson MD;  [facility];  Service:    [facility] [place] I&D, POST SPINE, CERV/THOR/CERV-THOR N/A 11/28/2017    Procedure: I&D AND DEBRIDEMENT  of deep  of thoracic spine WOUND;  Surgeon: Dorian Grayson MD;  [facility];  Service: Orthopedics      Family History:     Family History   Problem Relation Age of Onset    No Known Problems Mother       Social History:     Social History     Socioeconomic History    Marital status: /Civil Union     Spouse name: Not on file    Number of children: Not on file    Years of education: Not on file    Highest education level: Not on file   Occupational History    Not on file   Social Needs    Financial resource strain: Not on file    Food insecurity:     Worry: Not on file     Inability: Not on file    Transportation needs:     Medical: Not on file     Non-medical: Not on file   Tobacco Use    Smoking status: Former Smoker    Smokeless tobacco: Never Used   Substance and Sexual Activity    Alcohol use: No    Drug use: No    Sexual activity: Not on file   Lifestyle    Physical activity:     Days per week: Not on file     Minutes per session: Not on file    Stress: Not on file   Relationships    Social connections:     Talks on phone: Not on file     Gets together: Not on file     Attends Zoroastrian service: Not on file     Active member of club or organization: Not on file     Attends meetings of clubs or organizations: Not on file     Relationship status: Not on file    Intimate partner violence:     Fear of current or ex partner: Not on file     Emotionally abused: Not on file     Physically abused: Not on file     Forced sexual activity: Not on file   Other Topics Concern    Not on file   Social History Narrative    Not on file       Medications and Allergies: Current Outpatient Medications   Medication Sig Dispense Refill    albuterol (2 5 mg/3 mL) 0 083 % nebulizer solution Take 2 5 mg by nebulization every 6 (six) hours as needed for wheezing      aspirin 81 mg chewable tablet Chew 81 mg      atorvastatin (LIPITOR) 80 mg tablet Take 1 tablet (80 mg total) by mouth daily 90 tablet 3    b complex vitamins tablet Take 1 tablet by mouth daily      carvedilol (COREG) 3 125 mg tablet TAKE 1 TABLET BY MOUTH TWICE A DAY WITH MEALS 120 tablet 5    cholecalciferol (VITAMIN D3) 1,000 units tablet Take 1,000 Units by mouth daily      clopidogrel (PLAVIX) 75 mg tablet Take 75 mg by mouth daily      Coenzyme Q10 (COQ10) 100 MG CAPS Take 300 mg by mouth daily        fluticasone (FLONASE) 50 mcg/act nasal spray 1 spray into each nostril daily 16 g 0    fluticasone-umeclidinium-vilanterol (TRELEGY ELLIPTA) 100-62 5-25 MCG/INH inhaler Inhale 1 puff daily Rinse mouth after use  2 Inhaler 0    furosemide (LASIX) 20 mg tablet Take 1 tablet by mouth 2 (two) times a day      levalbuterol (XOPENEX HFA) 45 mcg/act inhaler Inhale 2 puffs every 4 (four) hours as needed      levalbuterol (XOPENEX) 0 63 mg/3 mL nebulizer solution Take 0 63 mg by nebulization every 4 (four) hours as needed for wheezing      lisinopril (ZESTRIL) 2 5 mg tablet TAKE ONE TABLET BY MOUTH EVERY DAY AT BEDTIME 30 tablet 5    metFORMIN (GLUCOPHAGE) 500 mg tablet Take 500 mg by mouth      montelukast (SINGULAIR) 10 mg tablet Take 1 tablet (10 mg total) by mouth daily at bedtime 90 tablet 1    Respiratory Therapy Supplies (NEBULIZER/TUBING/MOUTHPIECE) KIT by Does not apply route every 4 (four) hours as needed (shortness of breath) 1 each 1    tiotropium (SPIRIVA) 18 mcg inhalation capsule Place 18 mcg into inhaler and inhale as needed         No current facility-administered medications for this visit        Allergies   Allergen Reactions    No Known Allergies Other (See Comments)      Immunizations: Immunization History   Administered Date(s) Administered    INFLUENZA 11/29/2017    Influenza Quadrivalent Preservative Free 3 years and older IM 11/29/2017    Influenza Split High Dose Preservative Free IM 01/13/2017    Influenza, high dose seasonal 0 5 mL 10/17/2018    Pneumococcal Polysaccharide PPV23 12/04/2018    Tdap 04/13/2017      Health Maintenance: There are no preventive care reminders to display for this patient  Topic Date Due    Influenza Vaccine  07/01/2019    Pneumococcal Vaccine: 65+ Years (2 of 2 - PCV13) 12/04/2019      Medicare Health Risk Assessment:     BP 98/60   Pulse 76   Temp 98 9 °F (37 2 °C)   Ht 5' 1" (1 549 m)   Wt 55 7 kg (122 lb 12 8 oz)   SpO2 100%   BMI 23 20 kg/m²          Health Risk Assessment:   Patient rates overall health as poor  Patient feels that their physical health rating is slightly worse  Eyesight was rated as same  Hearing was rated as slightly worse  Patient feels that their emotional and mental health rating is same  Pain experienced in the last 7 days has been none  Patient states that she has experienced weight loss or gain in last 6 months  Depression Screening:   PHQ-2 Score: 2      Fall Risk Screening: In the past year, patient has experienced: history of falling in past year    Number of falls: 2 or more  Injured during fall?: Yes    Feels unsteady when standing or walking?: Yes    Worried about falling?: Yes      Urinary Incontinence Screening:   Patient has leaked urine accidently in the last six months  Home Safety:  Patient does not have trouble with stairs inside or outside of their home  Patient has working smoke alarms and has working carbon monoxide detector  Home safety hazards include: none  Nutrition:   Current diet is Regular and Limited junk food  Medications:   Patient is currently taking over-the-counter supplements  OTC medications include: see medication list  Patient is able to manage medications  Activities of Daily Living (ADLs)/Instrumental Activities of Daily Living (IADLs):   Walk and transfer into and out of bed and chair?: Yes  Dress and groom yourself?: Yes    Bathe or shower yourself?: Yes    Feed yourself?  Yes  Do your laundry/housekeeping?: Yes  Manage your money, pay your bills and track your expenses?: Yes  Make your own meals?: Yes    Do your own shopping?: Yes    Previous Hospitalizations:   Any hospitalizations or ED visits within the last 12 months?: Yes    How many hospitalizations have you had in the last year?: 3-4    Advance Care Planning:     Five wishes given: Yes      PREVENTIVE SCREENINGS      Cardiovascular Screening:    General: History Lipid Disorder and Screening Current      Diabetes Screening:     General: History Diabetes and Screening Current      Colorectal Cancer Screening:     General: Screening Not Indicated      Breast Cancer Screening:     General: Screening Not Indicated      Cervical Cancer Screening:    General: Screening Not Indicated      Osteoporosis Screening:    General: Screening Current      Abdominal Aortic Aneurysm (AAA) Screening:        General: Screening Not Indicated      Lung Cancer Screening:     General: Screening Not Indicated      Hepatitis C Screening:    General: Screening Not Indicated    Hep C Screening Accepted: No       Jack Dickson MD

## 2019-12-04 NOTE — PATIENT INSTRUCTIONS
Medicare Preventive Visit Patient Instructions  Thank you for completing your Welcome to Medicare Visit or Medicare Annual Wellness Visit today  Your next wellness visit will be due in one year (12/4/2020)  The screening/preventive services that you may require over the next 5-10 years are detailed below  Some tests may not apply to you based off risk factors and/or age  Screening tests ordered at today's visit but not completed yet may show as past due  Also, please note that scanned in results may not display below  Preventive Screenings:  Service Recommendations Previous Testing/Comments   Colorectal Cancer Screening  * Colonoscopy    * Fecal Occult Blood Test (FOBT)/Fecal Immunochemical Test (FIT)  * Fecal DNA/Cologuard Test  * Flexible Sigmoidoscopy Age: 54-65 years old   Colonoscopy: every 10 years (may be performed more frequently if at higher risk)  OR  FOBT/FIT: every 1 year  OR  Cologuard: every 3 years  OR  Sigmoidoscopy: every 5 years  Screening may be recommended earlier than age 48 if at higher risk for colorectal cancer  Also, an individualized decision between you and your healthcare provider will decide whether screening between the ages of 74-80 would be appropriate  Colonoscopy: Not on file  FOBT/FIT: 08/22/2018  Cologuard: Not on file  Sigmoidoscopy: Not on file         Breast Cancer Screening Age: 36 years old  Frequency: every 1-2 years  Not required if history of left and right mastectomy Mammogram: 03/29/2017       Cervical Cancer Screening Between the ages of 21-29, pap smear recommended once every 3 years  Between the ages of 33-67, can perform pap smear with HPV co-testing every 5 years     Recommendations may differ for women with a history of total hysterectomy, cervical cancer, or abnormal pap smears in past  Pap Smear: Not on file    Screening Not Indicated   Hepatitis C Screening Once for adults born between 1945 and 1965  More frequently in patients at high risk for Hepatitis C Hep C Antibody: Not on file       Diabetes Screening 1-2 times per year if you're at risk for diabetes or have pre-diabetes Fasting glucose: 92 mg/dL   A1C: 6 4 %    Screening Not Indicated  History Diabetes   Cholesterol Screening Once every 5 years if you don't have a lipid disorder  May order more often based on risk factors  Lipid panel: 03/12/2019    Screening Not Indicated  History Lipid Disorder     Other Preventive Screenings Covered by Medicare:  1  Abdominal Aortic Aneurysm (AAA) Screening: covered once if your at risk  You're considered to be at risk if you have a family history of AAA  2  Lung Cancer Screening: covers low dose CT scan once per year if you meet all of the following conditions: (1) Age 50-69; (2) No signs or symptoms of lung cancer; (3) Current smoker or have quit smoking within the last 15 years; (4) You have a tobacco smoking history of at least 30 pack years (packs per day multiplied by number of years you smoked); (5) You get a written order from a healthcare provider  3  Glaucoma Screening: covered annually if you're considered high risk: (1) You have diabetes OR (2) Family history of glaucoma OR (3)  aged 48 and older OR (3)  American aged 72 and older  3  Osteoporosis Screening: covered every 2 years if you meet one of the following conditions: (1) You're estrogen deficient and at risk for osteoporosis based off medical history and other findings; (2) Have a vertebral abnormality; (3) On glucocorticoid therapy for more than 3 months; (4) Have primary hyperparathyroidism; (5) On osteoporosis medications and need to assess response to drug therapy  · Last bone density test (DXA Scan): 05/29/2018  5  HIV Screening: covered annually if you're between the age of 12-76  Also covered annually if you are younger than 13 and older than 72 with risk factors for HIV infection   For pregnant patients, it is covered up to 3 times per pregnancy  Immunizations:  Immunization Recommendations   Influenza Vaccine Annual influenza vaccination during flu season is recommended for all persons aged >= 6 months who do not have contraindications   Pneumococcal Vaccine (Prevnar and Pneumovax)  * Prevnar = PCV13  * Pneumovax = PPSV23   Adults 25-60 years old: 1-3 doses may be recommended based on certain risk factors  Adults 72 years old: Prevnar (PCV13) vaccine recommended followed by Pneumovax (PPSV23) vaccine  If already received PPSV23 since turning 65, then PCV13 recommended at least one year after PPSV23 dose  Hepatitis B Vaccine 3 dose series if at intermediate or high risk (ex: diabetes, end stage renal disease, liver disease)   Tetanus (Td) Vaccine - COST NOT COVERED BY MEDICARE PART B Following completion of primary series, a booster dose should be given every 10 years to maintain immunity against tetanus  Td may also be given as tetanus wound prophylaxis  Tdap Vaccine - COST NOT COVERED BY MEDICARE PART B Recommended at least once for all adults  For pregnant patients, recommended with each pregnancy  Shingles Vaccine (Shingrix) - COST NOT COVERED BY MEDICARE PART B  2 shot series recommended in those aged 48 and above     Health Maintenance Due:  There are no preventive care reminders to display for this patient  Immunizations Due:      Topic Date Due    Influenza Vaccine  07/01/2019    Pneumococcal Vaccine: 65+ Years (2 of 2 - PCV13) 12/04/2019     Advance Directives   What are advance directives? Advance directives are legal documents that state your wishes and plans for medical care  These plans are made ahead of time in case you lose your ability to make decisions for yourself  Advance directives can apply to any medical decision, such as the treatments you want, and if you want to donate organs  What are the types of advance directives?   There are many types of advance directives, and each state has rules about how to use them  You may choose a combination of any of the following:  · Living will: This is a written record of the treatment you want  You can also choose which treatments you do not want, which to limit, and which to stop at a certain time  This includes surgery, medicine, IV fluid, and tube feedings  · Durable power of  for healthcare [facility]): This is a written record that states who you want to make healthcare choices for you when you are unable to make them for yourself  This person, called a proxy, is usually a family member or a friend  You may choose more than 1 proxy  · Do not resuscitate (DNR) order:  A DNR order is used in case your heart stops beating or you stop breathing  It is a request not to have certain forms of treatment, such as CPR  A DNR order may be included in other types of advance directives  · Medical directive: This covers the care that you want if you are in a coma, near death, or unable to make decisions for yourself  You can list the treatments you want for each condition  Treatment may include pain medicine, surgery, blood transfusions, dialysis, IV or tube feedings, and a ventilator (breathing machine)  · Values history: This document has questions about your views, beliefs, and how you feel and think about life  This information can help others choose the care that you would choose  Why are advance directives important? An advance directive helps you control your care  Although spoken wishes may be used, it is better to have your wishes written down  Spoken wishes can be misunderstood, or not followed  Treatments may be given even if you do not want them  An advance directive may make it easier for your family to make difficult choices about your care  Fall Prevention    Fall prevention  includes ways to make your home and other areas safer  It also includes ways you can move more carefully to prevent a fall   Health conditions that cause changes in your blood pressure, vision, or muscle strength and coordination may increase your risk for falls  Medicines may also increase your risk for falls if they make you dizzy, weak, or sleepy  Fall prevention tips:   · Stand or sit up slowly  · Use assistive devices as directed  · Wear shoes that fit well and have soles that   · Wear a personal alarm  · Stay active  · Manage your medical conditions  Home Safety Tips:  · Add items to prevent falls in the bathroom  · Keep paths clear  · Install bright lights in your home  · Keep items you use often on shelves within reach  · Paint or place reflective tape on the edges of your stairs  Urinary Incontinence   Urinary incontinence (UI)  is when you lose control of your bladder  UI develops because your bladder cannot store or empty urine properly  The 3 most common types of UI are stress incontinence, urge incontinence, or both  Medicines:   · May be given to help strengthen your bladder control  Report any side effects of medication to your healthcare provider  Do pelvic muscle exercises often:  Your pelvic muscles help you stop urinating  Squeeze these muscles tight for 5 seconds, then relax for 5 seconds  Gradually work up to squeezing for 10 seconds  Do 3 sets of 15 repetitions a day, or as directed  This will help strengthen your pelvic muscles and improve bladder control  Train your bladder:  Go to the bathroom at set times, such as every 2 hours, even if you do not feel the urge to go  You can also try to hold your urine when you feel the urge to go  For example, hold your urine for 5 minutes when you feel the urge to go  As that becomes easier, hold your urine for 10 minutes  Self-care:   · Keep a UI record  Write down how often you leak urine and how much you leak  Make a note of what you were doing when you leaked urine  · Drink liquids as directed  You may need to limit the amount of liquid you drink to help control your urine leakage   Do not drink any liquid right before you go to bed  Limit or do not have drinks that contain caffeine or alcohol  · Prevent constipation  Eat a variety of high-fiber foods  Good examples are high-fiber cereals, beans, vegetables, and whole-grain breads  Walking is the best way to trigger your intestines to have a bowel movement  · Exercise regularly and maintain a healthy weight  Weight loss and exercise will decrease pressure on your bladder and help you control your leakage  · Use a catheter as directed  to help empty your bladder  A catheter is a tiny, plastic tube that is put into your bladder to drain your urine  · Go to behavior therapy as directed  Behavior therapy may be used to help you learn to control your urge to urinate  © Copyright [employer] 2018 Information is for End User's use only and may not be sold, redistributed or otherwise used for commercial purposes   All illustrations and images included in CareNotes® are the copyrighted property of [employer]  or [address] [place] [address] [employer]

## 2019-12-04 NOTE — PROGRESS NOTES
Assessment/Plan:    No problem-specific Assessment & Plan notes found for this encounter  Diagnoses and all orders for this visit:    Impaired fasting glucose  -     POCT hemoglobin A1c, 5 2 normal no need for medications at this time  -     IRIS Diabetic eye exam    Ambulatory dysfunction  -     Ambulatory referral to Physical Therapy; Future    Need for influenza vaccination  -     influenza vaccine, 9922-0108, high-dose, PF 0 5 mL (FLUZONE HIGH-DOSE)    Need for pneumococcal vaccination  -     PNEUMOCOCCAL CONJUGATE VACCINE 13-VALENT GREATER THAN 6 MONTHS    Hyponatremia  -     Comprehensive metabolic panel; Future    Hypotension  Advised to follow with Dr Calos Arango, cardiologist of symptoms worsen recommend going to the ER    Fatigue, unspecified type  -     CBC and differential; Future    Medicare annual wellness visit, subsequent  See Medicare wellness  Other orders  -     clopidogrel (PLAVIX) 75 mg tablet; Take 75 mg by mouth daily  -     metFORMIN (GLUCOPHAGE) 500 mg tablet; Take 500 mg by mouth  -     levalbuterol (XOPENEX HFA) 45 mcg/act inhaler; Inhale 2 puffs every 4 (four) hours as needed  -     Cancel: metFORMIN (GLUCOPHAGE) 500 mg tablet; Take 1 tablet (500 mg total) by mouth daily with breakfast      Follow up in 6 months or as needed    Subjective:      Patient ID: Alex Cain is a 82 y o  female  Patient is here to follow up, per patient she had a fall 2-3 months ago and broke her right hip and had a arcenio placed on her right hip area  She says that she is still having problems walking and is at risk for falls  When she was in the hospital her glucose levels were high and she was receiving insulin injections  Her blood pressure has been low as well recently she denies any confusion symptoms  She has a history of cardiomyopathy and follows up with Dr Calos Arango          The following portions of the patient's history were reviewed and updated as appropriate: She  has a past medical history of Cardiac disease, CHF (congestive heart failure) ([facility] ), Diabetes mellitus ([facility] ), Emphysema lung ([facility] ), History of fall, Hyperlipidemia, Hypertension, Incontinence in female, Pulmonary emphysema ([facility] ), and Wound infection after surgery  She   Patient Active Problem List    Diagnosis Date Noted    Need for influenza vaccination 2019    Fatigue 2019    Ambulatory dysfunction 2019    Chronic diastolic CHF (congestive heart failure) ([facility] ) 2019    Pain of right thumb 2019    Need for lipid screening 2019    Medicare annual wellness visit, subsequent 2018    Need for pneumococcal vaccination 2018    Nasal congestion 2018    Menopause ovarian failure 2018    Hyponatremia 2018    Dyspnea on exertion 2018    NSTEMI (non-ST elevated myocardial infarction) ([facility] ) 2018    Type 2 diabetes mellitus treated without insulin ([place] [id number] ) 2018    Abnormal TSH 2018    Chronic systolic dysfunction of left ventricle 2018    Pulmonary emphysema ([facility] ) 2018    Anemia 2018    Screening for osteoporosis 2018    Local infection of skin and subcutaneous tissue 2017    Fall 2017    Physical deconditioning 2017    Incontinence of urine in female 2017    Pain 2017    Pain in scapula 2017    Pain in back 2017     She  has a past surgical history that includes  section; Appendectomy; Lumbar fusion (N/A, 2017); Incision and drainage posterior spine (N/A, 2017); and pr i&d, post spine, cerv/thor/cerv-thor (N/A, 2017)  Her family history includes No Known Problems in her mother  She  reports that she has quit smoking  She has never used smokeless tobacco  She reports that she does not drink alcohol or use drugs    Current Outpatient Medications   Medication Sig Dispense Refill    albuterol (2 5 mg/3 mL) 0 083 % nebulizer solution Take 2 5 mg by nebulization every 6 (six) hours as needed for wheezing      aspirin 81 mg chewable tablet Chew 81 mg      atorvastatin (LIPITOR) 80 mg tablet Take 1 tablet (80 mg total) by mouth daily 90 tablet 3    b complex vitamins tablet Take 1 tablet by mouth daily      carvedilol (COREG) 3 125 mg tablet TAKE 1 TABLET BY MOUTH TWICE A DAY WITH MEALS 120 tablet 5    cholecalciferol (VITAMIN D3) 1,000 units tablet Take 1,000 Units by mouth daily      clopidogrel (PLAVIX) 75 mg tablet Take 75 mg by mouth daily      Coenzyme Q10 (COQ10) 100 MG CAPS Take 300 mg by mouth daily        fluticasone (FLONASE) 50 mcg/act nasal spray 1 spray into each nostril daily 16 g 0    fluticasone-umeclidinium-vilanterol (TRELEGY ELLIPTA) 100-62 5-25 MCG/INH inhaler Inhale 1 puff daily Rinse mouth after use  2 Inhaler 0    furosemide (LASIX) 20 mg tablet Take 1 tablet by mouth 2 (two) times a day      levalbuterol (XOPENEX HFA) 45 mcg/act inhaler Inhale 2 puffs every 4 (four) hours as needed      levalbuterol (XOPENEX) 0 63 mg/3 mL nebulizer solution Take 0 63 mg by nebulization every 4 (four) hours as needed for wheezing      lisinopril (ZESTRIL) 2 5 mg tablet TAKE ONE TABLET BY MOUTH EVERY DAY AT BEDTIME 30 tablet 5    metFORMIN (GLUCOPHAGE) 500 mg tablet Take 500 mg by mouth      montelukast (SINGULAIR) 10 mg tablet Take 1 tablet (10 mg total) by mouth daily at bedtime 90 tablet 1    Respiratory Therapy Supplies (NEBULIZER/TUBING/MOUTHPIECE) KIT by Does not apply route every 4 (four) hours as needed (shortness of breath) 1 each 1    tiotropium (SPIRIVA) 18 mcg inhalation capsule Place 18 mcg into inhaler and inhale as needed         No current facility-administered medications for this visit        Current Outpatient Medications on File Prior to Visit   Medication Sig    albuterol (2 5 mg/3 mL) 0 083 % nebulizer solution Take 2 5 mg by nebulization every 6 (six) hours as needed for wheezing    aspirin 81 mg chewable tablet Chew 81 mg    atorvastatin (LIPITOR) 80 mg tablet Take 1 tablet (80 mg total) by mouth daily    b complex vitamins tablet Take 1 tablet by mouth daily    carvedilol (COREG) 3 125 mg tablet TAKE 1 TABLET BY MOUTH TWICE A DAY WITH MEALS    cholecalciferol (VITAMIN D3) 1,000 units tablet Take 1,000 Units by mouth daily    clopidogrel (PLAVIX) 75 mg tablet Take 75 mg by mouth daily    Coenzyme Q10 (COQ10) 100 MG CAPS Take 300 mg by mouth daily      fluticasone (FLONASE) 50 mcg/act nasal spray 1 spray into each nostril daily    fluticasone-umeclidinium-vilanterol (TRELEGY ELLIPTA) 100-62 5-25 MCG/INH inhaler Inhale 1 puff daily Rinse mouth after use   furosemide (LASIX) 20 mg tablet Take 1 tablet by mouth 2 (two) times a day    levalbuterol (XOPENEX HFA) 45 mcg/act inhaler Inhale 2 puffs every 4 (four) hours as needed    levalbuterol (XOPENEX) 0 63 mg/3 mL nebulizer solution Take 0 63 mg by nebulization every 4 (four) hours as needed for wheezing    lisinopril (ZESTRIL) 2 5 mg tablet TAKE ONE TABLET BY MOUTH EVERY DAY AT BEDTIME    metFORMIN (GLUCOPHAGE) 500 mg tablet Take 500 mg by mouth    montelukast (SINGULAIR) 10 mg tablet Take 1 tablet (10 mg total) by mouth daily at bedtime    Respiratory Therapy Supplies (NEBULIZER/TUBING/MOUTHPIECE) KIT by Does not apply route every 4 (four) hours as needed (shortness of breath)    tiotropium (SPIRIVA) 18 mcg inhalation capsule Place 18 mcg into inhaler and inhale as needed      [DISCONTINUED] gabapentin (NEURONTIN) 300 mg capsule Take 1 capsule (300 mg total) by mouth daily at bedtime (Patient not taking: Reported on 12/4/2019)    [DISCONTINUED] gabapentin (NEURONTIN) 300 mg capsule Take 300 mg by mouth     No current facility-administered medications on file prior to visit  She is allergic to no known allergies       Review of Systems   Constitutional: Negative for activity change, appetite change, fatigue and fever     HENT: Negative for congestion and ear discharge  Respiratory: Negative for cough and shortness of breath  Cardiovascular: Negative for chest pain and palpitations  Gastrointestinal: Negative for diarrhea and nausea  Musculoskeletal: Negative for arthralgias and back pain  Skin: Negative for color change and rash  Neurological: Positive for weakness  Negative for dizziness and headaches  Psychiatric/Behavioral: Negative for agitation and behavioral problems  Objective:      BP 98/60   Pulse 76   Temp 98 9 °F (37 2 °C)   Ht 5' 1" (1 549 m)   Wt 55 7 kg (122 lb 12 8 oz)   SpO2 100%   BMI 23 20 kg/m²          Physical Exam   Constitutional: She is oriented to person, place, and time  She appears well-developed and well-nourished  No distress  HENT:   Head: Normocephalic and atraumatic  Nose: Nose normal    Mouth/Throat: Oropharynx is clear and moist    Eyes: Pupils are equal, round, and reactive to light  Conjunctivae are normal    Cardiovascular: Normal rate, regular rhythm and normal heart sounds  No murmur heard  Pulmonary/Chest: Effort normal and breath sounds normal  No respiratory distress  She has no wheezes  Abdominal: Soft  Bowel sounds are normal  She exhibits no distension  There is no tenderness  Musculoskeletal:   Unstable gate slow get up and go test    Neurological: She is alert and oriented to person, place, and time  Skin: Skin is warm and dry  No rash noted  She is not diaphoretic  No erythema  Psychiatric: She has a normal mood and affect

## 2020-01-14 DIAGNOSIS — I10 ESSENTIAL HYPERTENSION: ICD-10-CM

## 2020-01-14 RX ORDER — LISINOPRIL 2.5 MG/1
TABLET ORAL
Qty: 30 TABLET | Refills: 0 | Status: SHIPPED | OUTPATIENT
Start: 2020-01-14 | End: 2020-09-11 | Stop reason: SDUPTHER

## 2020-05-11 DIAGNOSIS — E78.2 MIXED HYPERLIPIDEMIA: ICD-10-CM

## 2020-05-11 DIAGNOSIS — R26.2 AMBULATORY DYSFUNCTION: Primary | ICD-10-CM

## 2020-05-11 RX ORDER — ATORVASTATIN CALCIUM 80 MG/1
80 TABLET, FILM COATED ORAL DAILY
Qty: 90 TABLET | Refills: 3 | Status: SHIPPED | OUTPATIENT
Start: 2020-05-11 | End: 2021-05-14

## 2020-05-11 RX ORDER — GABAPENTIN 300 MG/1
300 CAPSULE ORAL DAILY
COMMUNITY
Start: 2020-03-13 | End: 2020-05-11 | Stop reason: SDUPTHER

## 2020-05-11 RX ORDER — GABAPENTIN 300 MG/1
300 CAPSULE ORAL DAILY
Qty: 90 CAPSULE | Refills: 0 | Status: SHIPPED | OUTPATIENT
Start: 2020-05-11 | End: 2020-08-27

## 2020-05-15 DIAGNOSIS — Z91.09 ENVIRONMENTAL ALLERGIES: ICD-10-CM

## 2020-05-15 RX ORDER — MONTELUKAST SODIUM 10 MG/1
TABLET ORAL
Qty: 90 TABLET | Refills: 1 | Status: SHIPPED | OUTPATIENT
Start: 2020-05-15 | End: 2020-11-10

## 2020-07-17 ENCOUNTER — OFFICE VISIT (OUTPATIENT)
Dept: FAMILY MEDICINE CLINIC | Facility: CLINIC | Age: 85
End: 2020-07-17
Payer: MEDICARE

## 2020-07-17 ENCOUNTER — TELEPHONE (OUTPATIENT)
Dept: FAMILY MEDICINE CLINIC | Facility: CLINIC | Age: 85
End: 2020-07-17

## 2020-07-17 VITALS
HEART RATE: 78 BPM | OXYGEN SATURATION: 99 % | SYSTOLIC BLOOD PRESSURE: 113 MMHG | TEMPERATURE: 98.1 F | WEIGHT: 129.2 LBS | BODY MASS INDEX: 24.41 KG/M2 | DIASTOLIC BLOOD PRESSURE: 70 MMHG

## 2020-07-17 DIAGNOSIS — R21 SKIN RASH: Primary | ICD-10-CM

## 2020-07-17 DIAGNOSIS — E11.9 TYPE 2 DIABETES MELLITUS TREATED WITHOUT INSULIN (HCC): ICD-10-CM

## 2020-07-17 DIAGNOSIS — R21 SKIN RASH: ICD-10-CM

## 2020-07-17 DIAGNOSIS — R73.01 IMPAIRED FASTING GLUCOSE: Primary | ICD-10-CM

## 2020-07-17 PROBLEM — M79.644 PAIN OF RIGHT THUMB: Status: RESOLVED | Noted: 2019-03-05 | Resolved: 2020-07-17

## 2020-07-17 PROBLEM — M89.8X1 PAIN IN SCAPULA: Status: RESOLVED | Noted: 2017-04-13 | Resolved: 2020-07-17

## 2020-07-17 PROBLEM — R52 PAIN: Status: RESOLVED | Noted: 2017-04-13 | Resolved: 2020-07-17

## 2020-07-17 LAB — SL AMB POCT HEMOGLOBIN AIC: 5.7 (ref ?–6.5)

## 2020-07-17 PROCEDURE — 4040F PNEUMOC VAC/ADMIN/RCVD: CPT | Performed by: FAMILY MEDICINE

## 2020-07-17 PROCEDURE — 99214 OFFICE O/P EST MOD 30 MIN: CPT | Performed by: FAMILY MEDICINE

## 2020-07-17 PROCEDURE — 1160F RVW MEDS BY RX/DR IN RCRD: CPT | Performed by: FAMILY MEDICINE

## 2020-07-17 PROCEDURE — 3044F HG A1C LEVEL LT 7.0%: CPT | Performed by: FAMILY MEDICINE

## 2020-07-17 PROCEDURE — 3074F SYST BP LT 130 MM HG: CPT | Performed by: FAMILY MEDICINE

## 2020-07-17 PROCEDURE — 1036F TOBACCO NON-USER: CPT | Performed by: FAMILY MEDICINE

## 2020-07-17 PROCEDURE — 3078F DIAST BP <80 MM HG: CPT | Performed by: FAMILY MEDICINE

## 2020-07-17 PROCEDURE — 83036 HEMOGLOBIN GLYCOSYLATED A1C: CPT | Performed by: FAMILY MEDICINE

## 2020-07-17 RX ORDER — HYDROXYZINE HYDROCHLORIDE 25 MG/1
25 TABLET, FILM COATED ORAL EVERY 6 HOURS PRN
Qty: 30 TABLET | Refills: 0 | Status: SHIPPED | OUTPATIENT
Start: 2020-07-17

## 2020-07-17 RX ORDER — TRIAMCINOLONE ACETONIDE 5 MG/G
CREAM TOPICAL 3 TIMES DAILY
Qty: 30 G | Refills: 2 | Status: SHIPPED | OUTPATIENT
Start: 2020-07-17

## 2020-07-17 NOTE — TELEPHONE ENCOUNTER
Brenton Radames called saying that she thought you were sending over a pill and a cream, only a cream was sent into the pharmacy  Were you sending a pill for her as well?

## 2020-07-17 NOTE — PROGRESS NOTES
Assessment/Plan:    No problem-specific Assessment & Plan notes found for this encounter  Diagnoses and all orders for this visit:    Impaired fasting glucose  -     POCT hemoglobin A1c- 5 7 she was advised to stop metformin and continue lifestyle modifications only  Skin rash  -     triamcinolone (KENALOG) 0 5 % cream; Apply topically 3 (three) times a day      Follow up in 3-6 months or as needed    Subjective:      Patient ID: Jorge Woods is a 82 y o  female  Patient is here to follow up for type 2 diabetes Mellitus  She does not check her glucose levels daily  She takes metformin 500 mg once dailt  Also she has been having an itchy rash on both arms  The following portions of the patient's history were reviewed and updated as appropriate:   She  has a past medical history of Cardiac disease, CHF (congestive heart failure) ([facility] ), Diabetes mellitus ([facility] ), Emphysema lung ([facility] ), History of fall, Hyperlipidemia, Hypertension, Incontinence in female, Pulmonary emphysema ([facility] ), and Wound infection after surgery    She   Patient Active Problem List    Diagnosis Date Noted    Skin rash 07/17/2020    Need for influenza vaccination 12/04/2019    Fatigue 12/04/2019    Ambulatory dysfunction 12/04/2019    Hypotension 12/04/2019    Chronic diastolic CHF (congestive heart failure) ([facility] ) 03/05/2019    Need for lipid screening 03/05/2019    Medicare annual wellness visit, subsequent 12/04/2018    Need for pneumococcal vaccination 12/04/2018    Nasal congestion 12/04/2018    Menopause ovarian failure 12/04/2018    Hyponatremia 12/04/2018    Dyspnea on exertion 08/17/2018    NSTEMI (non-ST elevated myocardial infarction) ([facility] ) 05/18/2018    Impaired fasting glucose 05/18/2018    Abnormal TSH 05/18/2018    Chronic systolic dysfunction of left ventricle 05/18/2018    Pulmonary emphysema ([facility] ) 05/18/2018    Anemia 05/18/2018    Screening for osteoporosis 05/18/2018    Local infection of skin and subcutaneous tissue 2017    Fall 2017    Physical deconditioning 2017    Incontinence of urine in female 2017    Pain in back 2017     She  has a past surgical history that includes  section; Appendectomy; Lumbar fusion (N/A, 2017); Incision and drainage posterior spine (N/A, 2017); and pr i&d, post spine, cerv/thor/cerv-thor (N/A, 2017)  Her family history includes No Known Problems in her mother  She  reports that she has quit smoking  She has never used smokeless tobacco  She reports that she does not drink alcohol or use drugs  Current Outpatient Medications   Medication Sig Dispense Refill    albuterol (2 5 mg/3 mL) 0 083 % nebulizer solution Take 2 5 mg by nebulization every 6 (six) hours as needed for wheezing      aspirin 81 mg chewable tablet Chew 81 mg      atorvastatin (LIPITOR) 80 mg tablet Take 1 tablet (80 mg total) by mouth daily 90 tablet 3    b complex vitamins tablet Take 1 tablet by mouth daily      carvedilol (COREG) 3 125 mg tablet TAKE 1 TABLET BY MOUTH TWICE A DAY WITH MEALS 120 tablet 5    cholecalciferol (VITAMIN D3) 1,000 units tablet Take 1,000 Units by mouth daily      clopidogrel (PLAVIX) 75 mg tablet Take 75 mg by mouth daily      Coenzyme Q10 (COQ10) 100 MG CAPS Take 300 mg by mouth daily        fluticasone (FLONASE) 50 mcg/act nasal spray 1 spray into each nostril daily 16 g 0    fluticasone-umeclidinium-vilanterol (TRELEGY ELLIPTA) 100-62 5-25 MCG/INH inhaler Inhale 1 puff daily Rinse mouth after use   2 Inhaler 0    furosemide (LASIX) 20 mg tablet Take 1 tablet by mouth 2 (two) times a day      gabapentin (NEURONTIN) 300 mg capsule Take 1 capsule (300 mg total) by mouth daily 90 capsule 0    levalbuterol (XOPENEX) 0 63 mg/3 mL nebulizer solution Take 0 63 mg by nebulization every 4 (four) hours as needed for wheezing      lisinopril (ZESTRIL) 2 5 mg tablet TAKE ONE TABLET BY MOUTH EVERY DAY AT BEDTIME 30 tablet 0    montelukast (SINGULAIR) 10 mg tablet TAKE 1 TABLET (10MG TOTAL) BY MOUTH DAILY AT BEDTIME 90 tablet 1    Respiratory Therapy Supplies (NEBULIZER/TUBING/MOUTHPIECE) KIT by Does not apply route every 4 (four) hours as needed (shortness of breath) 1 each 1    tiotropium (SPIRIVA) 18 mcg inhalation capsule Place 18 mcg into inhaler and inhale as needed        triamcinolone (KENALOG) 0 5 % cream Apply topically 3 (three) times a day 30 g 2     No current facility-administered medications for this visit  Current Outpatient Medications on File Prior to Visit   Medication Sig    albuterol (2 5 mg/3 mL) 0 083 % nebulizer solution Take 2 5 mg by nebulization every 6 (six) hours as needed for wheezing    aspirin 81 mg chewable tablet Chew 81 mg    atorvastatin (LIPITOR) 80 mg tablet Take 1 tablet (80 mg total) by mouth daily    b complex vitamins tablet Take 1 tablet by mouth daily    carvedilol (COREG) 3 125 mg tablet TAKE 1 TABLET BY MOUTH TWICE A DAY WITH MEALS    cholecalciferol (VITAMIN D3) 1,000 units tablet Take 1,000 Units by mouth daily    clopidogrel (PLAVIX) 75 mg tablet Take 75 mg by mouth daily    Coenzyme Q10 (COQ10) 100 MG CAPS Take 300 mg by mouth daily      fluticasone (FLONASE) 50 mcg/act nasal spray 1 spray into each nostril daily    fluticasone-umeclidinium-vilanterol (TRELEGY ELLIPTA) 100-62 5-25 MCG/INH inhaler Inhale 1 puff daily Rinse mouth after use      furosemide (LASIX) 20 mg tablet Take 1 tablet by mouth 2 (two) times a day    gabapentin (NEURONTIN) 300 mg capsule Take 1 capsule (300 mg total) by mouth daily    levalbuterol (XOPENEX) 0 63 mg/3 mL nebulizer solution Take 0 63 mg by nebulization every 4 (four) hours as needed for wheezing    lisinopril (ZESTRIL) 2 5 mg tablet TAKE ONE TABLET BY MOUTH EVERY DAY AT BEDTIME    montelukast (SINGULAIR) 10 mg tablet TAKE 1 TABLET (10MG TOTAL) BY MOUTH DAILY AT BEDTIME    Respiratory Therapy Supplies (NEBULIZER/TUBING/MOUTHPIECE) KIT by Does not apply route every 4 (four) hours as needed (shortness of breath)    tiotropium (SPIRIVA) 18 mcg inhalation capsule Place 18 mcg into inhaler and inhale as needed      [DISCONTINUED] metFORMIN (GLUCOPHAGE) 500 mg tablet Take 500 mg by mouth     No current facility-administered medications on file prior to visit  She is allergic to no known allergies       Review of Systems   Constitutional: Negative for activity change, appetite change, fatigue and fever  HENT: Negative for congestion and ear discharge  Respiratory: Negative for cough and shortness of breath  Cardiovascular: Negative for chest pain and palpitations  Gastrointestinal: Negative for diarrhea and nausea  Musculoskeletal: Negative for arthralgias and back pain  Skin: Positive for rash  Negative for color change  Neurological: Negative for dizziness and headaches  Psychiatric/Behavioral: Negative for agitation and behavioral problems  Objective:      /70   Pulse 78   Temp 98 1 °F (36 7 °C)   Wt 58 6 kg (129 lb 3 2 oz)   SpO2 99%   BMI 24 41 kg/m²          Physical Exam   Constitutional: She is oriented to person, place, and time  She appears well-developed and well-nourished  No distress  HENT:   Head: Normocephalic and atraumatic  Nose: Nose normal    Mouth/Throat: Oropharynx is clear and moist    Eyes: Pupils are equal, round, and reactive to light  Conjunctivae are normal    Cardiovascular: Normal rate, regular rhythm and normal heart sounds  Pulses are no weak pulses  No murmur heard  Pulses:       Dorsalis pedis pulses are 2+ on the right side, and 2+ on the left side  Posterior tibial pulses are 2+ on the right side, and 2+ on the left side  Pulmonary/Chest: Effort normal and breath sounds normal  No respiratory distress  She has no wheezes  Abdominal: Soft  Bowel sounds are normal  She exhibits no distension  There is no tenderness     Feet: Right Foot:   Skin Integrity: Negative for ulcer, skin breakdown, erythema, warmth, callus or dry skin  Left Foot:   Skin Integrity: Negative for ulcer, skin breakdown, erythema, warmth, callus or dry skin  Neurological: She is alert and oriented to person, place, and time  Skin: Skin is warm and dry  Rash noted  She is not diaphoretic  There is erythema  Psychiatric: She has a normal mood and affect  Diabetic Foot Exam    Patient's shoes and socks removed  Right Foot/Ankle   Right Foot Inspection  Skin Exam: skin normal and skin intact no dry skin, no warmth, no callus, no erythema, no maceration, no abnormal color, no pre-ulcer, no ulcer and no callus                          Toe Exam: ROM and strength within normal limits  Sensory   Vibration: intact  Proprioception: intact   Monofilament testing: intact  Vascular    The right DP pulse is 2+  The right PT pulse is 2+  Left Foot/Ankle  Left Foot Inspection  Skin Exam: skin normal and skin intactno dry skin, no warmth, no erythema, no maceration, normal color, no pre-ulcer, no ulcer and no callus                         Toe Exam: ROM and strength within normal limits                   Sensory   Vibration: intact  Proprioception: intact  Monofilament: intact  Vascular    The left DP pulse is 2+  The left PT pulse is 2+  Assign Risk Category:  No deformity present; No loss of protective sensation;  No weak pulses       Risk: 0

## 2020-07-27 DIAGNOSIS — I50.32 CHRONIC DIASTOLIC CHF (CONGESTIVE HEART FAILURE) (HCC): Primary | ICD-10-CM

## 2020-07-27 RX ORDER — FUROSEMIDE 20 MG/1
40 TABLET ORAL DAILY
Qty: 90 TABLET | Refills: 3 | Status: SHIPPED | OUTPATIENT
Start: 2020-07-27 | End: 2020-08-04 | Stop reason: SDUPTHER

## 2020-08-04 DIAGNOSIS — I50.32 CHRONIC DIASTOLIC CHF (CONGESTIVE HEART FAILURE) (HCC): ICD-10-CM

## 2020-08-04 RX ORDER — FUROSEMIDE 40 MG/1
40 TABLET ORAL DAILY
Qty: 90 TABLET | Refills: 1 | Status: SHIPPED | OUTPATIENT
Start: 2020-08-04 | End: 2021-01-25

## 2020-08-26 DIAGNOSIS — R26.2 AMBULATORY DYSFUNCTION: ICD-10-CM

## 2020-08-27 RX ORDER — GABAPENTIN 300 MG/1
CAPSULE ORAL
Qty: 90 CAPSULE | Refills: 0 | Status: SHIPPED | OUTPATIENT
Start: 2020-08-27 | End: 2020-11-30

## 2020-09-10 DIAGNOSIS — I10 ESSENTIAL HYPERTENSION: ICD-10-CM

## 2020-09-10 RX ORDER — CARVEDILOL 3.12 MG/1
TABLET ORAL
Qty: 120 TABLET | Refills: 5 | Status: SHIPPED | OUTPATIENT
Start: 2020-09-10 | End: 2021-09-14

## 2020-09-11 DIAGNOSIS — I10 ESSENTIAL HYPERTENSION: ICD-10-CM

## 2020-09-11 RX ORDER — LISINOPRIL 2.5 MG/1
2.5 TABLET ORAL
Qty: 30 TABLET | Refills: 5 | Status: SHIPPED | OUTPATIENT
Start: 2020-09-11 | End: 2021-03-29

## 2020-11-10 DIAGNOSIS — Z91.09 ENVIRONMENTAL ALLERGIES: ICD-10-CM

## 2020-11-10 RX ORDER — MONTELUKAST SODIUM 10 MG/1
TABLET ORAL
Qty: 90 TABLET | Refills: 1 | Status: SHIPPED | OUTPATIENT
Start: 2020-11-10 | End: 2021-05-14

## 2020-11-30 DIAGNOSIS — R26.2 AMBULATORY DYSFUNCTION: ICD-10-CM

## 2020-11-30 RX ORDER — GABAPENTIN 300 MG/1
CAPSULE ORAL
Qty: 90 CAPSULE | Refills: 0 | Status: SHIPPED | OUTPATIENT
Start: 2020-11-30 | End: 2021-03-11

## 2021-01-20 ENCOUNTER — OFFICE VISIT (OUTPATIENT)
Dept: FAMILY MEDICINE CLINIC | Facility: CLINIC | Age: 86
End: 2021-01-20
Payer: MEDICARE

## 2021-01-20 VITALS
WEIGHT: 138 LBS | DIASTOLIC BLOOD PRESSURE: 62 MMHG | OXYGEN SATURATION: 99 % | TEMPERATURE: 98.9 F | HEART RATE: 85 BPM | BODY MASS INDEX: 26.07 KG/M2 | SYSTOLIC BLOOD PRESSURE: 124 MMHG

## 2021-01-20 DIAGNOSIS — Z00.00 MEDICARE ANNUAL WELLNESS VISIT, SUBSEQUENT: ICD-10-CM

## 2021-01-20 DIAGNOSIS — J44.9 COPD, MODERATE (HCC): Primary | ICD-10-CM

## 2021-01-20 DIAGNOSIS — E28.39 MENOPAUSE OVARIAN FAILURE: ICD-10-CM

## 2021-01-20 DIAGNOSIS — Z13.220 NEED FOR LIPID SCREENING: ICD-10-CM

## 2021-01-20 DIAGNOSIS — E11.9 TYPE 2 DIABETES MELLITUS TREATED WITHOUT INSULIN (HCC): ICD-10-CM

## 2021-01-20 PROBLEM — L08.9 LOCAL INFECTION OF SKIN AND SUBCUTANEOUS TISSUE: Status: RESOLVED | Noted: 2017-11-29 | Resolved: 2021-01-20

## 2021-01-20 PROBLEM — R21 SKIN RASH: Status: RESOLVED | Noted: 2020-07-17 | Resolved: 2021-01-20

## 2021-01-20 PROBLEM — R09.81 NASAL CONGESTION: Status: RESOLVED | Noted: 2018-12-04 | Resolved: 2021-01-20

## 2021-01-20 PROCEDURE — 99213 OFFICE O/P EST LOW 20 MIN: CPT | Performed by: FAMILY MEDICINE

## 2021-01-20 PROCEDURE — 1123F ACP DISCUSS/DSCN MKR DOCD: CPT | Performed by: FAMILY MEDICINE

## 2021-01-20 PROCEDURE — G0439 PPPS, SUBSEQ VISIT: HCPCS | Performed by: FAMILY MEDICINE

## 2021-01-20 NOTE — PROGRESS NOTES
Assessment and Plan:     Problem List Items Addressed This Visit     None           Preventive health issues were discussed with patient, and age appropriate screening tests were ordered as noted in patient's After Visit Summary  Personalized health advice and appropriate referrals for health education or preventive services given if needed, as noted in patient's After Visit Summary  History of Present Illness:     Patient presents for Welcome to Medicare visit       Patient Care Team:  Fiona Peres MD as PCP - General (Family Medicine)  MD Felecia Lewis, DO     Review of Systems:     Review of Systems   Problem List:     Patient Active Problem List   Diagnosis    Fall    Physical deconditioning    Incontinence of urine in female    Pain in back    Local infection of skin and subcutaneous tissue    [facility]non-ST elevated [facility]) [facility]    Impaired fasting glucose    Abnormal TSH    Chronic systolic dysfunction of left ventricle    Pulmonary emphysema (HCC)    Anemia    Screening for osteoporosis    Dyspnea on exertion    Medicare annual wellness visit, subsequent    Need for pneumococcal vaccination    Nasal congestion    Menopause ovarian failure    Hyponatremia    Chronic diastolic CHF (congestive heart failure) [facility]    Need for lipid screening    Need for influenza vaccination    Fatigue    Ambulatory dysfunction    Hypotension    Skin rash      Past Medical and Surgical History:     Past Medical History:   Diagnosis Date    Cardiac disease     CH[facility]congestive heart failure) [facility]     Diabetes mellitus ([facility]     Emphysema lung ([facility]     History of fall     Hyperlipidemia     Hypertension     Incontinence in female     Pulmonary emphysema [facility]     Wound infection after surgery      Past Surgical History:   Procedure Laterality Date    APPENDECTOMY       SECTION      INCISION AND DRAINAGE POSTERIOR SPINE N/A 2017 Procedure: THORACIC WOUND EXPLORATION, WASHOUT, DEBRIDEMENT and CLOSURE;  Surgeon: Isaías Dow MD;  [facility];  Service: Orthopedics    LUMBAR FUSION N/A 4/14/2017    Procedure: T3-T8 Posterior Spinal Fusion;  Surgeon: Isaías Dow MD;  [facility];  Service:    [facility] I&D, POST SPINE, CERV/THOR/CERV-THOR N/A 11/28/2017    Procedure: I&D AND DEBRIDEMENT  of deep  of thoracic spine WOUND;  Surgeon: Isaías Dow MD;  [facility];  Service: Orthopedics      Family History:     Family History   Problem Relation Age of Onset    No Known Problems Mother       Social History:        Social History     Socioeconomic History    Marital status: /Civil Union     Spouse name: Not on file    Number of children: Not on file    Years of education: Not on file    Highest education level: Not on file   Occupational History    Not on file   Social Needs    Financial resource strain: Not on file    Food insecurity     Worry: Not on file     Inability: Not on file   [employer] needs     Medical: Not on file     Non-medical: Not on file   Tobacco Use    Smoking status: Former Smoker    Smokeless tobacco: Never Used   Substance and Sexual Activity    Alcohol use: No    Drug use: No    Sexual activity: Not on file   Lifestyle    Physical activity     Days per week: Not on file     Minutes per session: Not on file    Stress: Not on file   Relationships    Social connections     Talks on phone: Not on file     Gets together: Not on file     Attends Alevism service: Not on file     Active member of club or organization: Not on file     Attends meetings of clubs or organizations: Not on file     Relationship status: Not on file    Intimate partner violence     Fear of current or ex partner: Not on file     Emotionally abused: Not on file     Physically abused: Not on file     Forced sexual activity: Not on file   Other Topics Concern    Not on file   Social History Narrative    Not on file      Medications and Allergies:     Current Outpatient Medications   Medication Sig Dispense Refill    albuterol (2 5 mg/3 mL) 0 083 % nebulizer solution Take 2 5 mg by nebulization every 6 (six) hours as needed for wheezing      aspirin 81 mg chewable tablet Chew 81 mg      atorvastatin (LIPITOR) 80 mg tablet Take 1 tablet (80 mg total) by mouth daily 90 tablet 3    b complex vitamins tablet Take 1 tablet by mouth daily      carvedilol (COREG) 3 125 mg tablet TAKE ONE TABLET BY MOUTH TWICE A DAY WITH MEALS 120 tablet 5    cholecalciferol (VITAMIN D3) 1,000 units tablet Take 1,000 Units by mouth daily      clopidogrel (PLAVIX) 75 mg tablet Take 75 mg by mouth daily      Coenzyme Q10 (COQ10) 100 MG CAPS Take 300 mg by mouth daily        fluticasone (FLONASE) 50 mcg/act nasal spray 1 spray into each nostril daily 16 g 0    fluticasone-umeclidinium-vilanterol (TRELEGY ELLIPTA) 100-62 5-25 MCG/INH inhaler Inhale 1 puff daily Rinse mouth after use   2 Inhaler 0    furosemide (LASIX) 40 mg tablet Take 1 tablet (40 mg total) by mouth daily Take one daily 90 tablet 1    gabapentin (NEURONTIN) 300 mg capsule TAKE ONE CAPSULE BY MOUTH EVERY DAY 90 capsule 0    hydrOXYzine HCL (ATARAX) 25 mg tablet Take 1 tablet (25 mg total) by mouth every 6 (six) hours as needed for itching 30 tablet 0    levalbuterol (XOPENEX) 0 63 mg/3 mL nebulizer solution Take 0 63 mg by nebulization every 4 (four) hours as needed for wheezing      lisinopril (ZESTRIL) 2 5 mg tablet Take 1 tablet (2 5 mg total) by mouth daily at bedtime 30 tablet 5    montelukast (SINGULAIR) 10 mg tablet TAKE ONE TABLET BY MOUTH AT BEDTIME 90 tablet 1    Respiratory Therapy Supplies (NEBULIZER/TUBING/MOUTHPIECE) KIT by Does not apply route every 4 (four) hours as needed (shortness of breath) 1 each 1    tiotropium (SPIRIVA) 18 mcg inhalation capsule Place 18 mcg into inhaler and inhale as needed        triamcinolone (KENALOG) 0 5 % cream Apply topically 3 (three) times a day 30 g 2    Ventolin  (90 Base) MCG/ACT inhaler        No current facility-administered medications for this visit  Allergies   Allergen Reactions    No Known Allergies Other (See Comments)      Immunizations:     Immunization History   Administered Date(s) Administered    INFLUENZA 11/29/2017    Influenza Quadrivalent Preservative Free 3 years and older IM 11/29/2017    Influenza Split High Dose Preservative Free IM 01/13/2017    Influenza, high dose seasonal 0 7 mL 10/17/2018, 12/04/2019    Pneumococcal Conjugate 13-Valent 12/04/2019    Pneumococcal Polysaccharide PPV23 12/04/2018    Tdap 04/13/2017      Health Maintenance: There are no preventive care reminders to display for this patient  Topic Date Due    Influenza Vaccine (1) 09/01/2020      Medicare Screening Tests and Risk Assessments:     Shelbi Gonzalez is here for her Subsequent Wellness visit  Health Risk Assessment:   Patient rates overall health as fair  Patient feels that their physical health rating is same  Eyesight was rated as same  Hearing was rated as same  Patient feels that their emotional and mental health rating is same  Pain experienced in the last 7 days has been none  Patient states that she has experienced weight loss or gain in last 6 months  Depression Screening:   PHQ-2 Score: 0      Fall Risk Screening: In the past year, patient has experienced: no history of falling in past year      Urinary Incontinence Screening:   Patient has not leaked urine accidently in the last six months  Home Safety:  Patient has trouble with stairs inside or outside of their home  Patient has working smoke alarms and has working carbon monoxide detector  Home safety hazards include: none  Nutrition:   Current diet is Regular  Medications:   Patient is currently taking over-the-counter supplements  OTC medications include: vitamin d3,c10,b complex,mucinex   Patient is able to manage medications  Activities of Daily Living (ADLs)/Instrumental Activities of Daily Living (IADLs):   Walk and transfer into and out of bed and chair?: Yes  Dress and groom yourself?: Yes    Bathe or shower yourself?: Yes    Feed yourself?  Yes  Do your laundry/housekeeping?: Yes  Manage your money, pay your bills and track your expenses?: Yes  Make your own meals?: Yes    Do your own shopping?: Yes    Previous Hospitalizations:   Any hospitalizations or ED visits within the last 12 months?: No      Advance Care Planning:     Five wishes given: Yes      PREVENTIVE SCREENINGS      Cardiovascular Screening:    General: History Lipid Disorder    Due for: Lipid Panel      Diabetes Screening:     General: History Diabetes    Due for: Blood Glucose      Colorectal Cancer Screening:     General: Screening Not Indicated      Breast Cancer Screening:     General: Screening Not Indicated      Cervical Cancer Screening:    General: Screening Not Indicated      Osteoporosis Screening:      Due for: DXA Axial      Abdominal Aortic Aneurysm (AAA) Screening:        General: Screening Not Indicated      Lung Cancer Screening:     General: Screening Not Indicated      Hepatitis C Screening:    General: Screening Not Indicated    Hep C Screening Accepted: No     No exam data present     Physical Exam:     Wt 62 6 kg (138 lb)   BMI 26 07 kg/m²     Physical Exam     Sofia Cheatham MD

## 2021-01-20 NOTE — PATIENT INSTRUCTIONS
Medicare Preventive Visit Patient Instructions  Thank you for completing your Welcome to Medicare Visit or Medicare Annual Wellness Visit today  Your next wellness visit will be due in one year (1/20/2022)  The screening/preventive services that you may require over the next 5-10 years are detailed below  Some tests may not apply to you based off risk factors and/or age  Screening tests ordered at today's visit but not completed yet may show as past due  Also, please note that scanned in results may not display below  Preventive Screenings:  Service Recommendations Previous Testing/Comments   Colorectal Cancer Screening  * Colonoscopy    * Fecal Occult Blood Test (FOBT)/Fecal Immunochemical Test (FIT)  * Fecal DNA/Cologuard Test  * Flexible Sigmoidoscopy Age: 54-65 years old   Colonoscopy: every 10 years (may be performed more frequently if at higher risk)  OR  FOBT/FIT: every 1 year  OR  Cologuard: every 3 years  OR  Sigmoidoscopy: every 5 years  Screening may be recommended earlier than age 48 if at higher risk for colorectal cancer  Also, an individualized decision between you and your healthcare provider will decide whether screening between the ages of 74-80 would be appropriate  Colonoscopy: Not on file  FOBT/FIT: 08/22/2018  Cologuard: Not on file  Sigmoidoscopy: Not on file         Breast Cancer Screening Age: 36 years old  Frequency: every 1-2 years  Not required if history of left and right mastectomy Mammogram: 03/29/2017       Cervical Cancer Screening Between the ages of 21-29, pap smear recommended once every 3 years  Between the ages of 33-67, can perform pap smear with HPV co-testing every 5 years     Recommendations may differ for women with a history of total hysterectomy, cervical cancer, or abnormal pap smears in past  Pap Smear: Not on file    Screening Not Indicated   Hepatitis C Screening Once for adults born between 1945 and 1965  More frequently in patients at high risk for Hepatitis C Hep C Antibody: Not on file       Diabetes Screening 1-2 times per year if you're at risk for diabetes or have pre-diabetes Fasting glucose: 92 mg/dL   A1C: 5 7    Screening Not Indicated  History Diabetes   Cholesterol Screening Once every 5 years if you don't have a lipid disorder  May order more often based on risk factors  Lipid panel: 03/12/2019    Screening Not Indicated  History Lipid Disorder     Other Preventive Screenings Covered by Medicare:  1  Abdominal Aortic Aneurysm (AAA) Screening: covered once if your at risk  You're considered to be at risk if you have a family history of AAA  2  Lung Cancer Screening: covers low dose CT scan once per year if you meet all of the following conditions: (1) Age 50-69; (2) No signs or symptoms of lung cancer; (3) Current smoker or have quit smoking within the last 15 years; (4) You have a tobacco smoking history of at least 30 pack years (packs per day multiplied by number of years you smoked); (5) You get a written order from a healthcare provider  3  Glaucoma Screening: covered annually if you're considered high risk: (1) You have diabetes OR (2) Family history of glaucoma OR (3)  aged 48 and older OR (3)  American aged 72 and older  3  Osteoporosis Screening: covered every 2 years if you meet one of the following conditions: (1) You're estrogen deficient and at risk for osteoporosis based off medical history and other findings; (2) Have a vertebral abnormality; (3) On glucocorticoid therapy for more than 3 months; (4) Have primary hyperparathyroidism; (5) On osteoporosis medications and need to assess response to drug therapy  · Last bone density test (DXA Scan): 05/29/2018  5  HIV Screening: covered annually if you're between the age of 12-76  Also covered annually if you are younger than 13 and older than 72 with risk factors for HIV infection   For pregnant patients, it is covered up to 3 times per pregnancy  Immunizations:  Immunization Recommendations   Influenza Vaccine Annual influenza vaccination during flu season is recommended for all persons aged >= 6 months who do not have contraindications   Pneumococcal Vaccine (Prevnar and Pneumovax)  * Prevnar = PCV13  * Pneumovax = PPSV23   Adults 25-60 years old: 1-3 doses may be recommended based on certain risk factors  Adults 72 years old: Prevnar (PCV13) vaccine recommended followed by Pneumovax (PPSV23) vaccine  If already received PPSV23 since turning 65, then PCV13 recommended at least one year after PPSV23 dose  Hepatitis B Vaccine 3 dose series if at intermediate or high risk (ex: diabetes, end stage renal disease, liver disease)   Tetanus (Td) Vaccine - COST NOT COVERED BY MEDICARE PART B Following completion of primary series, a booster dose should be given every 10 years to maintain immunity against tetanus  Td may also be given as tetanus wound prophylaxis  Tdap Vaccine - COST NOT COVERED BY MEDICARE PART B Recommended at least once for all adults  For pregnant patients, recommended with each pregnancy  Shingles Vaccine (Shingrix) - COST NOT COVERED BY MEDICARE PART B  2 shot series recommended in those aged 48 and above     Health Maintenance Due:  There are no preventive care reminders to display for this patient  Immunizations Due:      Topic Date Due    Influenza Vaccine (1) 09/01/2020     Advance Directives   What are advance directives? Advance directives are legal documents that state your wishes and plans for medical care  These plans are made ahead of time in case you lose your ability to make decisions for yourself  Advance directives can apply to any medical decision, such as the treatments you want, and if you want to donate organs  What are the types of advance directives? There are many types of advance directives, and each state has rules about how to use them   You may choose a combination of any of the following:  · Living will: This is a written record of the treatment you want  You can also choose which treatments you do not want, which to limit, and which to stop at a certain time  This includes surgery, medicine, IV fluid, and tube feedings  · Durable power of  for healthcare [facility]): This is a written record that states who you want to make healthcare choices for you when you are unable to make them for yourself  This person, called a proxy, is usually a family member or a friend  You may choose more than 1 proxy  · Do not resuscitate (DNR) order:  A DNR order is used in case your heart stops beating or you stop breathing  It is a request not to have certain forms of treatment, such as CPR  A DNR order may be included in other types of advance directives  · Medical directive: This covers the care that you want if you are in a coma, near death, or unable to make decisions for yourself  You can list the treatments you want for each condition  Treatment may include pain medicine, surgery, blood transfusions, dialysis, IV or tube feedings, and a ventilator (breathing machine)  · Values history: This document has questions about your views, beliefs, and how you feel and think about life  This information can help others choose the care that you would choose  Why are advance directives important? An advance directive helps you control your care  Although spoken wishes may be used, it is better to have your wishes written down  Spoken wishes can be misunderstood, or not followed  Treatments may be given even if you do not want them  An advance directive may make it easier for your family to make difficult choices about your care  © Copyright [employer] 2018 Information is for End User's use only and may not be sold, redistributed or otherwise used for commercial purposes   All illustrations and images included in CareNotes® are the copyrighted property of A [employer] A M [employer]  or [facility]

## 2021-01-20 NOTE — PROGRESS NOTES
Assessment/Plan:    No problem-specific Assessment & Plan notes found for this encounter  Diagnoses and all orders for this visit:    COPD, moderate ([address] )  Advised for her to take Spiriva daily    Menopause ovarian failure  -     DXA bone density spine hip and pelvis; Future    Type 2 diabetes mellitus treated without insulin ([facility])  -     Comprehensive metabolic panel; Future  -     Hemoglobin A1C; Future    Need for lipid screening  -     Lipid panel; Future    Medicare annual wellness visit, subsequent  See Medicare wellness note  Other orders  -     Ventolin  (90 Base) MCG/ACT inhaler      Follow up in 6 months or as needed    Subjective:      Patient ID: Laxmi Langley is a 82 y o  female  Patient has a history of COPD and takes ventolin as needed, Trelegy daily and Spiriva but not daily  She is on 3 liters of oxygen all the time  She also has CHF  She denies any SOB at this time sees Dr Keri Johnston  The following portions of the patient's history were reviewed and updated as appropriate:   She  has a past medical history of Cardiac disease, CHF (congestive heart failure) ([address] ), Diabetes mellitus ([address] ), Emphysema lung ([address] ), History of fall, Hyperlipidemia, Hypertension, Incontinence in female, Pulmonary emphysema ([address] ), and Wound infection after surgery    She   Patient Active Problem List    Diagnosis Date Noted    COPD, moderate ([address] ) 01/20/2021    Need for influenza vaccination 12/04/2019    Fatigue 12/04/2019    Ambulatory dysfunction 12/04/2019    Hypotension 12/04/2019    Chronic diastolic CHF (congestive heart failure) ([address] ) 03/05/2019    Need for lipid screening 03/05/2019    Medicare annual wellness visit, subsequent 12/04/2018    Need for pneumococcal vaccination 12/04/2018    Menopause ovarian failure 12/04/2018    Hyponatremia 12/04/2018    Dyspnea on exertion 08/17/2018    NSTEMI (non-ST elevated myocardial infarction) ([address] ) 05/18/2018    Impaired fasting glucose 2018    Abnormal TSH 2018    Chronic systolic dysfunction of left ventricle 2018    Pulmonary emphysema [facility] ) 2018    Anemia 2018    Screening for osteoporosis 2018    Fall 2017    Physical deconditioning 2017    Incontinence of urine in female 2017    Pain in back 2017     She  has a past surgical history that includes  section; Appendectomy; Lumbar fusion (N/A, 2017); Incision and drainage posterior spine (N/A, 2017); and pr i&d, post spine, cerv/thor/cerv-thor (N/A, 2017)  Her family history includes No Known Problems in her mother  She  reports that she has quit smoking  She has never used smokeless tobacco  She reports that she does not drink alcohol or use drugs  Current Outpatient Medications   Medication Sig Dispense Refill    albuterol (2 5 mg/3 mL) 0 083 % nebulizer solution Take 2 5 mg by nebulization every 6 (six) hours as needed for wheezing      aspirin 81 mg chewable tablet Chew 81 mg      atorvastatin (LIPITOR) 80 mg tablet Take 1 tablet (80 mg total) by mouth daily 90 tablet 3    b complex vitamins tablet Take 1 tablet by mouth daily      carvedilol (COREG) 3 125 mg tablet TAKE ONE TABLET BY MOUTH TWICE A DAY WITH MEALS 120 tablet 5    cholecalciferol (VITAMIN D3) 1,000 units tablet Take 1,000 Units by mouth daily      clopidogrel (PLAVIX) 75 mg tablet Take 75 mg by mouth daily      Coenzyme Q10 (COQ10) 100 MG CAPS Take 300 mg by mouth daily        fluticasone (FLONASE) 50 mcg/act nasal spray 1 spray into each nostril daily 16 g 0    fluticasone-umeclidinium-vilanterol (TRELEGY ELLIPTA) 100-62 5-25 MCG/INH inhaler Inhale 1 puff daily Rinse mouth after use   2 Inhaler 0    furosemide (LASIX) 40 mg tablet Take 1 tablet (40 mg total) by mouth daily Take one daily 90 tablet 1    gabapentin (NEURONTIN) 300 mg capsule TAKE ONE CAPSULE BY MOUTH EVERY DAY 90 capsule 0    hydrOXYzine HCL (ATARAX) 25 mg tablet Take 1 tablet (25 mg total) by mouth every 6 (six) hours as needed for itching 30 tablet 0    levalbuterol (XOPENEX) 0 63 mg/3 mL nebulizer solution Take 0 63 mg by nebulization every 4 (four) hours as needed for wheezing      lisinopril (ZESTRIL) 2 5 mg tablet Take 1 tablet (2 5 mg total) by mouth daily at bedtime 30 tablet 5    montelukast (SINGULAIR) 10 mg tablet TAKE ONE TABLET BY MOUTH AT BEDTIME 90 tablet 1    Respiratory Therapy Supplies (NEBULIZER/TUBING/MOUTHPIECE) KIT by Does not apply route every 4 (four) hours as needed (shortness of breath) 1 each 1    tiotropium (SPIRIVA) 18 mcg inhalation capsule Place 18 mcg into inhaler and inhale as needed        triamcinolone (KENALOG) 0 5 % cream Apply topically 3 (three) times a day 30 g 2    Ventolin  (90 Base) MCG/ACT inhaler        No current facility-administered medications for this visit  Current Outpatient Medications on File Prior to Visit   Medication Sig    albuterol (2 5 mg/3 mL) 0 083 % nebulizer solution Take 2 5 mg by nebulization every 6 (six) hours as needed for wheezing    aspirin 81 mg chewable tablet Chew 81 mg    atorvastatin (LIPITOR) 80 mg tablet Take 1 tablet (80 mg total) by mouth daily    b complex vitamins tablet Take 1 tablet by mouth daily    carvedilol (COREG) 3 125 mg tablet TAKE ONE TABLET BY MOUTH TWICE A DAY WITH MEALS    cholecalciferol (VITAMIN D3) 1,000 units tablet Take 1,000 Units by mouth daily    clopidogrel (PLAVIX) 75 mg tablet Take 75 mg by mouth daily    Coenzyme Q10 (COQ10) 100 MG CAPS Take 300 mg by mouth daily      fluticasone (FLONASE) 50 mcg/act nasal spray 1 spray into each nostril daily    fluticasone-umeclidinium-vilanterol (TRELEGY ELLIPTA) 100-62 5-25 MCG/INH inhaler Inhale 1 puff daily Rinse mouth after use      furosemide (LASIX) 40 mg tablet Take 1 tablet (40 mg total) by mouth daily Take one daily    gabapentin (NEURONTIN) 300 mg capsule TAKE ONE CAPSULE BY MOUTH EVERY DAY    hydrOXYzine HCL (ATARAX) 25 mg tablet Take 1 tablet (25 mg total) by mouth every 6 (six) hours as needed for itching    levalbuterol (XOPENEX) 0 63 mg/3 mL nebulizer solution Take 0 63 mg by nebulization every 4 (four) hours as needed for wheezing    lisinopril (ZESTRIL) 2 5 mg tablet Take 1 tablet (2 5 mg total) by mouth daily at bedtime    montelukast (SINGULAIR) 10 mg tablet TAKE ONE TABLET BY MOUTH AT BEDTIME    Respiratory Therapy Supplies (NEBULIZER/TUBING/MOUTHPIECE) KIT by Does not apply route every 4 (four) hours as needed (shortness of breath)    tiotropium (SPIRIVA) 18 mcg inhalation capsule Place 18 mcg into inhaler and inhale as needed      triamcinolone (KENALOG) 0 5 % cream Apply topically 3 (three) times a day    Ventolin  (90 Base) MCG/ACT inhaler      No current facility-administered medications on file prior to visit  She is allergic to no known allergies       Review of Systems   Constitutional: Negative for activity change, appetite change, fatigue and fever  HENT: Negative for congestion and ear discharge  Respiratory: Negative for cough and shortness of breath  Cardiovascular: Negative for chest pain and palpitations  Gastrointestinal: Negative for diarrhea and nausea  Musculoskeletal: Negative for arthralgias and back pain  Skin: Negative for color change and rash  Neurological: Negative for dizziness and headaches  Psychiatric/Behavioral: Negative for agitation and behavioral problems  Objective:      /62   Pulse 85   Temp 98 9 °F (37 2 °C)   Wt 62 6 kg (138 lb)   SpO2 99%   BMI 26 07 kg/m²          Physical Exam  Constitutional:       General: She is not in acute distress  Appearance: She is well-developed  She is not diaphoretic  HENT:      Head: Normocephalic and atraumatic  Nose: Nose normal    Eyes:      Conjunctiva/sclera: Conjunctivae normal       Pupils: Pupils are equal, round, and reactive to light  Cardiovascular:      Rate and Rhythm: Normal rate and regular rhythm  Heart sounds: Normal heart sounds  No murmur  Pulmonary:      Effort: Pulmonary effort is normal  No respiratory distress  Breath sounds: Normal breath sounds  No wheezing  Abdominal:      General: Bowel sounds are normal  There is no distension  Palpations: Abdomen is soft  Tenderness: There is no abdominal tenderness  Skin:     General: Skin is warm and dry  Findings: No erythema or rash  Neurological:      Mental Status: She is alert and oriented to person, place, and time

## 2021-01-25 DIAGNOSIS — I50.32 CHRONIC DIASTOLIC CHF (CONGESTIVE HEART FAILURE) (HCC): ICD-10-CM

## 2021-01-25 RX ORDER — FUROSEMIDE 40 MG/1
TABLET ORAL
Qty: 90 TABLET | Refills: 1 | Status: SHIPPED | OUTPATIENT
Start: 2021-01-25 | End: 2021-07-23

## 2021-03-10 DIAGNOSIS — R26.2 AMBULATORY DYSFUNCTION: ICD-10-CM

## 2021-03-11 RX ORDER — GABAPENTIN 300 MG/1
CAPSULE ORAL
Qty: 90 CAPSULE | Refills: 3 | Status: SHIPPED | OUTPATIENT
Start: 2021-03-11 | End: 2022-03-03

## 2021-03-28 DIAGNOSIS — I10 ESSENTIAL HYPERTENSION: ICD-10-CM

## 2021-03-29 RX ORDER — LISINOPRIL 2.5 MG/1
2.5 TABLET ORAL
Qty: 30 TABLET | Refills: 5 | Status: SHIPPED | OUTPATIENT
Start: 2021-03-29 | End: 2021-09-08

## 2021-05-07 ENCOUNTER — TELEPHONE (OUTPATIENT)
Dept: FAMILY MEDICINE CLINIC | Facility: CLINIC | Age: 86
End: 2021-05-07

## 2021-05-07 DIAGNOSIS — R04.0 FREQUENT NOSEBLEEDS: Primary | ICD-10-CM

## 2021-05-07 NOTE — TELEPHONE ENCOUNTER
Cleve has been having nose bleeds  On Wednesday she bled for 7 hours  She is on oxygen and a nebulizer  Please advise

## 2021-05-10 ENCOUNTER — APPOINTMENT (OUTPATIENT)
Dept: LAB | Facility: CLINIC | Age: 86
End: 2021-05-10
Payer: MEDICARE

## 2021-05-10 DIAGNOSIS — Z13.220 NEED FOR LIPID SCREENING: ICD-10-CM

## 2021-05-10 DIAGNOSIS — R04.0 FREQUENT NOSEBLEEDS: ICD-10-CM

## 2021-05-10 DIAGNOSIS — E11.9 TYPE 2 DIABETES MELLITUS TREATED WITHOUT INSULIN (HCC): ICD-10-CM

## 2021-05-10 LAB
ALBUMIN SERPL BCP-MCNC: 3.8 G/DL (ref 3.5–5)
ALP SERPL-CCNC: 54 U/L (ref 46–116)
ALT SERPL W P-5'-P-CCNC: 23 U/L (ref 12–78)
ANION GAP SERPL CALCULATED.3IONS-SCNC: 5 MMOL/L (ref 4–13)
AST SERPL W P-5'-P-CCNC: 16 U/L (ref 5–45)
BASOPHILS # BLD AUTO: 0.04 THOUSANDS/ΜL (ref 0–0.1)
BASOPHILS NFR BLD AUTO: 1 % (ref 0–1)
BILIRUB SERPL-MCNC: 0.35 MG/DL (ref 0.2–1)
BUN SERPL-MCNC: 18 MG/DL (ref 5–25)
CALCIUM SERPL-MCNC: 9.1 MG/DL (ref 8.3–10.1)
CHLORIDE SERPL-SCNC: 99 MMOL/L (ref 100–108)
CHOLEST SERPL-MCNC: 196 MG/DL (ref 50–200)
CO2 SERPL-SCNC: 31 MMOL/L (ref 21–32)
CREAT SERPL-MCNC: 0.53 MG/DL (ref 0.6–1.3)
EOSINOPHIL # BLD AUTO: 0.38 THOUSAND/ΜL (ref 0–0.61)
EOSINOPHIL NFR BLD AUTO: 6 % (ref 0–6)
ERYTHROCYTE [DISTWIDTH] IN BLOOD BY AUTOMATED COUNT: 13.6 % (ref 11.6–15.1)
GFR SERPL CREATININE-BSD FRML MDRD: 88 ML/MIN/1.73SQ M
GLUCOSE P FAST SERPL-MCNC: 99 MG/DL (ref 65–99)
HCT VFR BLD AUTO: 29.6 % (ref 34.8–46.1)
HDLC SERPL-MCNC: 107 MG/DL
HGB BLD-MCNC: 9.5 G/DL (ref 11.5–15.4)
IMM GRANULOCYTES # BLD AUTO: 0.02 THOUSAND/UL (ref 0–0.2)
IMM GRANULOCYTES NFR BLD AUTO: 0 % (ref 0–2)
LDLC SERPL CALC-MCNC: 77 MG/DL (ref 0–100)
LYMPHOCYTES # BLD AUTO: 1.51 THOUSANDS/ΜL (ref 0.6–4.47)
LYMPHOCYTES NFR BLD AUTO: 26 % (ref 14–44)
MCH RBC QN AUTO: 31.8 PG (ref 26.8–34.3)
MCHC RBC AUTO-ENTMCNC: 32.1 G/DL (ref 31.4–37.4)
MCV RBC AUTO: 99 FL (ref 82–98)
MONOCYTES # BLD AUTO: 0.73 THOUSAND/ΜL (ref 0.17–1.22)
MONOCYTES NFR BLD AUTO: 12 % (ref 4–12)
NEUTROPHILS # BLD AUTO: 3.23 THOUSANDS/ΜL (ref 1.85–7.62)
NEUTS SEG NFR BLD AUTO: 55 % (ref 43–75)
NONHDLC SERPL-MCNC: 89 MG/DL
NRBC BLD AUTO-RTO: 0 /100 WBCS
PLATELET # BLD AUTO: 202 THOUSANDS/UL (ref 149–390)
PMV BLD AUTO: 9.9 FL (ref 8.9–12.7)
POTASSIUM SERPL-SCNC: 4 MMOL/L (ref 3.5–5.3)
PROT SERPL-MCNC: 6.9 G/DL (ref 6.4–8.2)
RBC # BLD AUTO: 2.99 MILLION/UL (ref 3.81–5.12)
SODIUM SERPL-SCNC: 135 MMOL/L (ref 136–145)
TRIGL SERPL-MCNC: 62 MG/DL
WBC # BLD AUTO: 5.91 THOUSAND/UL (ref 4.31–10.16)

## 2021-05-10 PROCEDURE — 80061 LIPID PANEL: CPT

## 2021-05-10 PROCEDURE — 80053 COMPREHEN METABOLIC PANEL: CPT

## 2021-05-10 PROCEDURE — 83036 HEMOGLOBIN GLYCOSYLATED A1C: CPT

## 2021-05-10 PROCEDURE — 36415 COLL VENOUS BLD VENIPUNCTURE: CPT

## 2021-05-10 PROCEDURE — 85025 COMPLETE CBC W/AUTO DIFF WBC: CPT

## 2021-05-11 DIAGNOSIS — D64.9 ANEMIA, UNSPECIFIED TYPE: Primary | ICD-10-CM

## 2021-05-11 DIAGNOSIS — E53.8 B12 DEFICIENCY: ICD-10-CM

## 2021-05-11 LAB
EST. AVERAGE GLUCOSE BLD GHB EST-MCNC: 105 MG/DL
HBA1C MFR BLD: 5.3 %

## 2021-05-14 DIAGNOSIS — Z91.09 ENVIRONMENTAL ALLERGIES: ICD-10-CM

## 2021-05-14 DIAGNOSIS — E78.2 MIXED HYPERLIPIDEMIA: ICD-10-CM

## 2021-05-14 RX ORDER — ATORVASTATIN CALCIUM 80 MG/1
TABLET, FILM COATED ORAL
Qty: 90 TABLET | Refills: 3 | Status: SHIPPED | OUTPATIENT
Start: 2021-05-14 | End: 2022-05-09

## 2021-05-14 RX ORDER — MONTELUKAST SODIUM 10 MG/1
TABLET ORAL
Qty: 90 TABLET | Refills: 1 | Status: SHIPPED | OUTPATIENT
Start: 2021-05-14 | End: 2021-11-08

## 2021-06-15 ENCOUNTER — TELEPHONE (OUTPATIENT)
Dept: LAB | Facility: HOSPITAL | Age: 86
End: 2021-06-15

## 2021-06-17 ENCOUNTER — APPOINTMENT (OUTPATIENT)
Dept: LAB | Facility: HOSPITAL | Age: 86
End: 2021-06-17
Payer: MEDICARE

## 2021-06-17 DIAGNOSIS — R82.71 BACTERIA IN URINE: ICD-10-CM

## 2021-06-17 LAB
BACTERIA UR QL AUTO: ABNORMAL /HPF
BILIRUB UR QL STRIP: NEGATIVE
CLARITY UR: ABNORMAL
COLOR UR: YELLOW
GLUCOSE UR STRIP-MCNC: NEGATIVE MG/DL
HGB UR QL STRIP.AUTO: ABNORMAL
KETONES UR STRIP-MCNC: NEGATIVE MG/DL
LEUKOCYTE ESTERASE UR QL STRIP: ABNORMAL
NITRITE UR QL STRIP: POSITIVE
NON-SQ EPI CELLS URNS QL MICRO: ABNORMAL /HPF
PH UR STRIP.AUTO: 6 [PH]
PROT UR STRIP-MCNC: NEGATIVE MG/DL
RBC #/AREA URNS AUTO: ABNORMAL /HPF
SP GR UR STRIP.AUTO: 1.02 (ref 1–1.03)
UROBILINOGEN UR QL STRIP.AUTO: 0.2 E.U./DL
WBC #/AREA URNS AUTO: ABNORMAL /HPF

## 2021-06-17 PROCEDURE — 87086 URINE CULTURE/COLONY COUNT: CPT

## 2021-06-17 PROCEDURE — 87077 CULTURE AEROBIC IDENTIFY: CPT

## 2021-06-17 PROCEDURE — 81001 URINALYSIS AUTO W/SCOPE: CPT

## 2021-06-17 PROCEDURE — 87186 SC STD MICRODIL/AGAR DIL: CPT

## 2021-06-18 DIAGNOSIS — N39.0 URINARY TRACT INFECTION WITHOUT HEMATURIA, SITE UNSPECIFIED: ICD-10-CM

## 2021-06-18 DIAGNOSIS — D64.9 ANEMIA, UNSPECIFIED TYPE: Primary | ICD-10-CM

## 2021-06-18 RX ORDER — AMOXICILLIN AND CLAVULANATE POTASSIUM 875; 125 MG/1; MG/1
1 TABLET, FILM COATED ORAL EVERY 12 HOURS SCHEDULED
Qty: 14 TABLET | Refills: 0 | Status: SHIPPED | OUTPATIENT
Start: 2021-06-18 | End: 2021-06-25

## 2021-06-18 RX ORDER — FERROUS SULFATE TAB EC 324 MG (65 MG FE EQUIVALENT) 324 (65 FE) MG
324 TABLET DELAYED RESPONSE ORAL
Qty: 60 TABLET | Refills: 2 | Status: SHIPPED | OUTPATIENT
Start: 2021-06-18 | End: 2021-07-18

## 2021-06-19 LAB — BACTERIA UR CULT: ABNORMAL

## 2021-07-01 ENCOUNTER — TELEPHONE (OUTPATIENT)
Dept: FAMILY MEDICINE CLINIC | Facility: CLINIC | Age: 86
End: 2021-07-01

## 2021-07-01 NOTE — TELEPHONE ENCOUNTER
Suzette Leos from [address] [facility] called to report pt is getting an MRI on her knee this Tuesday 07/06  Pt requested Ana's services be put on hold until after her MRI

## 2021-07-01 NOTE — TELEPHONE ENCOUNTER
Preston Manzanares from [address] [facility] called as she was schedule to see patient today for at home nursing  Patient refused service today with Preston Manzanares stated that she is still having trouble with her left knee, but patient was seen by ortho and they gave her a brace and ordered an MRI which she is scheduled for on Tuesday 7/6/21    Patient did state she was at home PT

## 2021-07-23 DIAGNOSIS — I50.32 CHRONIC DIASTOLIC CHF (CONGESTIVE HEART FAILURE) (HCC): ICD-10-CM

## 2021-07-23 RX ORDER — FUROSEMIDE 40 MG/1
TABLET ORAL
Qty: 90 TABLET | Refills: 1 | Status: SHIPPED | OUTPATIENT
Start: 2021-07-23 | End: 2022-01-24

## 2021-08-06 ENCOUNTER — OFFICE VISIT (OUTPATIENT)
Dept: FAMILY MEDICINE CLINIC | Facility: CLINIC | Age: 86
End: 2021-08-06
Payer: MEDICARE

## 2021-08-06 VITALS
TEMPERATURE: 97.7 F | OXYGEN SATURATION: 100 % | WEIGHT: 142 LBS | HEIGHT: 60 IN | BODY MASS INDEX: 27.88 KG/M2 | HEART RATE: 70 BPM | DIASTOLIC BLOOD PRESSURE: 68 MMHG | SYSTOLIC BLOOD PRESSURE: 146 MMHG

## 2021-08-06 DIAGNOSIS — E78.2 MIXED HYPERLIPIDEMIA: ICD-10-CM

## 2021-08-06 DIAGNOSIS — D64.9 ANEMIA, UNSPECIFIED TYPE: Primary | ICD-10-CM

## 2021-08-06 DIAGNOSIS — Z13.220 NEED FOR LIPID SCREENING: ICD-10-CM

## 2021-08-06 DIAGNOSIS — R73.01 IMPAIRED FASTING GLUCOSE: ICD-10-CM

## 2021-08-06 PROBLEM — R53.83 FATIGUE: Status: RESOLVED | Noted: 2019-12-04 | Resolved: 2021-08-06

## 2021-08-06 PROCEDURE — 99213 OFFICE O/P EST LOW 20 MIN: CPT | Performed by: FAMILY MEDICINE

## 2021-08-06 NOTE — PROGRESS NOTES
Assessment/Plan:    No problem-specific Assessment & Plan notes found for this encounter  Diagnoses and all orders for this visit:    Anemia, unspecified type  -     CBC and differential; Future    Mixed hyperlipidemia    Impaired fasting glucose  -     Comprehensive metabolic panel; Future  -     Hemoglobin A1C; Future    Need for lipid screening  -     Lipid panel; Future      Follow up in 6 months    Subjective:      Patient ID: Nivia Pacheco is a 82 y o  female  Patient is here for a follow up has a history of anemia  Does feel fatigue had low iron taking ferrous sulfate twice daily  Does get constipation at times  The following portions of the patient's history were reviewed and updated as appropriate:   She  has a past medical history of Cardiac disease, CHF (congestive heart failure) ([facility] ), Diabetes mellitus ([facility] ), Emphysema lung ([facility] ), History of fall, Hyperlipidemia, Hypertension, Incontinence in female, Pulmonary emphysema ([address] ), and Wound infection after surgery    She   Patient Active Problem List    Diagnosis Date Noted    COPD, moderate ([address] ) 01/20/2021    Need for influenza vaccination 12/04/2019    Ambulatory dysfunction 12/04/2019    Hypotension 12/04/2019    Chronic diastolic CHF (congestive heart failure) ([facility] ) 03/05/2019    Need for lipid screening 03/05/2019    Medicare annual wellness visit, subsequent 12/04/2018    Need for pneumococcal vaccination 12/04/2018    Menopause ovarian failure 12/04/2018    Hyponatremia 12/04/2018    Dyspnea on exertion 08/17/2018    NSTEMI (non-ST elevated myocardial infarction) ([facility] ) 05/18/2018    Impaired fasting glucose 05/18/2018    Abnormal TSH 05/18/2018    Chronic systolic dysfunction of left ventricle 05/18/2018    Pulmonary emphysema ([facility] ) 05/18/2018    Anemia 05/18/2018    Screening for osteoporosis 05/18/2018    Fall 04/13/2017    Physical deconditioning 04/13/2017    Incontinence of urine in female 04/13/2017    Pain in back 2017     She  has a past surgical history that includes  section; Appendectomy; Lumbar fusion (N/A, 2017); Incision and drainage posterior spine (N/A, 2017); and pr i&d, post spine, cerv/thor/cerv-thor (N/A, 2017)  Her family history includes No Known Problems in her mother  She  reports that she has quit smoking  She has never used smokeless tobacco  She reports that she does not drink alcohol and does not use drugs  Current Outpatient Medications   Medication Sig Dispense Refill    albuterol (2 5 mg/3 mL) 0 083 % nebulizer solution Take 2 5 mg by nebulization every 6 (six) hours as needed for wheezing      aspirin 81 mg chewable tablet Chew 81 mg      atorvastatin (LIPITOR) 80 mg tablet TAKE ONE TABLET BY MOUTH EVERY DAY 90 tablet 3    b complex vitamins tablet Take 1 tablet by mouth daily      carvedilol (COREG) 3 125 mg tablet TAKE ONE TABLET BY MOUTH TWICE A DAY WITH MEALS 120 tablet 5    cholecalciferol (VITAMIN D3) 1,000 units tablet Take 1,000 Units by mouth daily      clopidogrel (PLAVIX) 75 mg tablet Take 75 mg by mouth daily      Coenzyme Q10 (COQ10) 100 MG CAPS Take 300 mg by mouth daily        ferrous sulfate 324 (65 Fe) mg Take 1 tablet (324 mg total) by mouth 2 (two) times a day before meals 60 tablet 2    fluticasone (FLONASE) 50 mcg/act nasal spray 1 spray into each nostril daily 16 g 0    fluticasone-umeclidinium-vilanterol (TRELEGY ELLIPTA) 100-62 5-25 MCG/INH inhaler Inhale 1 puff daily Rinse mouth after use   2 Inhaler 0    furosemide (LASIX) 40 mg tablet TAKE ONE TABLET BY MOUTH EVERY DAY 90 tablet 1    gabapentin (NEURONTIN) 300 mg capsule TAKE ONE CAPSULE BY MOUTH EVERY DAY 90 capsule 3    hydrOXYzine HCL (ATARAX) 25 mg tablet Take 1 tablet (25 mg total) by mouth every 6 (six) hours as needed for itching 30 tablet 0    levalbuterol (XOPENEX) 0 63 mg/3 mL nebulizer solution Take 0 63 mg by nebulization every 4 (four) hours as needed for wheezing      lisinopril (ZESTRIL) 2 5 mg tablet TAKE 1 TABLET (2 5 MG TOTAL) BY MOUTH DAILY AT BEDTIME 30 tablet 5    montelukast (SINGULAIR) 10 mg tablet TAKE ONE TABLET BY MOUTH EVERY DAY AT BEDTIME 90 tablet 1    Respiratory Therapy Supplies (NEBULIZER/TUBING/MOUTHPIECE) KIT by Does not apply route every 4 (four) hours as needed (shortness of breath) 1 each 1    tiotropium (SPIRIVA) 18 mcg inhalation capsule Place 18 mcg into inhaler and inhale as needed        triamcinolone (KENALOG) 0 5 % cream Apply topically 3 (three) times a day 30 g 2    Ventolin  (90 Base) MCG/ACT inhaler        No current facility-administered medications for this visit  Current Outpatient Medications on File Prior to Visit   Medication Sig    albuterol (2 5 mg/3 mL) 0 083 % nebulizer solution Take 2 5 mg by nebulization every 6 (six) hours as needed for wheezing    aspirin 81 mg chewable tablet Chew 81 mg    atorvastatin (LIPITOR) 80 mg tablet TAKE ONE TABLET BY MOUTH EVERY DAY    b complex vitamins tablet Take 1 tablet by mouth daily    carvedilol (COREG) 3 125 mg tablet TAKE ONE TABLET BY MOUTH TWICE A DAY WITH MEALS    cholecalciferol (VITAMIN D3) 1,000 units tablet Take 1,000 Units by mouth daily    clopidogrel (PLAVIX) 75 mg tablet Take 75 mg by mouth daily    Coenzyme Q10 (COQ10) 100 MG CAPS Take 300 mg by mouth daily      ferrous sulfate 324 (65 Fe) mg Take 1 tablet (324 mg total) by mouth 2 (two) times a day before meals    fluticasone (FLONASE) 50 mcg/act nasal spray 1 spray into each nostril daily    fluticasone-umeclidinium-vilanterol (TRELEGY ELLIPTA) 100-62 5-25 MCG/INH inhaler Inhale 1 puff daily Rinse mouth after use      furosemide (LASIX) 40 mg tablet TAKE ONE TABLET BY MOUTH EVERY DAY    gabapentin (NEURONTIN) 300 mg capsule TAKE ONE CAPSULE BY MOUTH EVERY DAY    hydrOXYzine HCL (ATARAX) 25 mg tablet Take 1 tablet (25 mg total) by mouth every 6 (six) hours as needed for itching    levalbuterol (XOPENEX) 0 63 mg/3 mL nebulizer solution Take 0 63 mg by nebulization every 4 (four) hours as needed for wheezing    lisinopril (ZESTRIL) 2 5 mg tablet TAKE 1 TABLET (2 5 MG TOTAL) BY MOUTH DAILY AT BEDTIME    montelukast (SINGULAIR) 10 mg tablet TAKE ONE TABLET BY MOUTH EVERY DAY AT BEDTIME    Respiratory Therapy Supplies (NEBULIZER/TUBING/MOUTHPIECE) KIT by Does not apply route every 4 (four) hours as needed (shortness of breath)    tiotropium (SPIRIVA) 18 mcg inhalation capsule Place 18 mcg into inhaler and inhale as needed      triamcinolone (KENALOG) 0 5 % cream Apply topically 3 (three) times a day    Ventolin  (90 Base) MCG/ACT inhaler      No current facility-administered medications on file prior to visit  She is allergic to no known allergies       Review of Systems   Constitutional: Negative for activity change, appetite change, fatigue and fever  HENT: Negative for congestion and ear discharge  Respiratory: Negative for cough and shortness of breath  Cardiovascular: Negative for chest pain and palpitations  Gastrointestinal: Negative for diarrhea and nausea  Musculoskeletal: Negative for arthralgias and back pain  Skin: Negative for color change and rash  Neurological: Negative for dizziness and headaches  Psychiatric/Behavioral: Negative for agitation and behavioral problems  Objective:      /68   Pulse 70   Temp 97 7 °F (36 5 °C)   Ht 5' (1 524 m)   Wt 64 4 kg (142 lb)   SpO2 100%   BMI 27 73 kg/m²          Physical Exam  Constitutional:       General: She is not in acute distress  Appearance: She is well-developed  She is not diaphoretic  HENT:      Head: Normocephalic and atraumatic  Nose: Nose normal    Eyes:      Conjunctiva/sclera: Conjunctivae normal       Pupils: Pupils are equal, round, and reactive to light  Cardiovascular:      Rate and Rhythm: Normal rate and regular rhythm        Heart sounds: Normal heart sounds  No murmur heard  Pulmonary:      Effort: Pulmonary effort is normal  No respiratory distress  Breath sounds: Normal breath sounds  No wheezing  Abdominal:      General: Bowel sounds are normal  There is no distension  Palpations: Abdomen is soft  Tenderness: There is no abdominal tenderness  Skin:     General: Skin is warm and dry  Findings: No erythema or rash  Neurological:      Mental Status: She is alert and oriented to person, place, and time

## 2021-09-08 DIAGNOSIS — I10 ESSENTIAL HYPERTENSION: ICD-10-CM

## 2021-09-08 RX ORDER — LISINOPRIL 2.5 MG/1
TABLET ORAL
Qty: 30 TABLET | Refills: 5 | Status: SHIPPED | OUTPATIENT
Start: 2021-09-08 | End: 2022-03-21

## 2021-09-14 DIAGNOSIS — I10 ESSENTIAL HYPERTENSION: ICD-10-CM

## 2021-09-14 RX ORDER — CARVEDILOL 3.12 MG/1
TABLET ORAL
Qty: 120 TABLET | Refills: 5 | Status: SHIPPED | OUTPATIENT
Start: 2021-09-14

## 2021-11-07 DIAGNOSIS — Z91.09 ENVIRONMENTAL ALLERGIES: ICD-10-CM

## 2021-11-08 RX ORDER — MONTELUKAST SODIUM 10 MG/1
TABLET ORAL
Qty: 90 TABLET | Refills: 1 | Status: SHIPPED | OUTPATIENT
Start: 2021-11-08 | End: 2022-05-09

## 2022-01-22 DIAGNOSIS — I50.32 CHRONIC DIASTOLIC CHF (CONGESTIVE HEART FAILURE) (HCC): ICD-10-CM

## 2022-01-24 RX ORDER — FUROSEMIDE 40 MG/1
TABLET ORAL
Qty: 90 TABLET | Refills: 1 | Status: SHIPPED | OUTPATIENT
Start: 2022-01-24

## 2022-03-03 DIAGNOSIS — R26.2 AMBULATORY DYSFUNCTION: ICD-10-CM

## 2022-03-03 RX ORDER — GABAPENTIN 300 MG/1
CAPSULE ORAL
Qty: 90 CAPSULE | Refills: 3 | Status: SHIPPED | OUTPATIENT
Start: 2022-03-03

## 2022-03-19 DIAGNOSIS — I10 ESSENTIAL HYPERTENSION: ICD-10-CM

## 2022-03-21 RX ORDER — LISINOPRIL 2.5 MG/1
TABLET ORAL
Qty: 30 TABLET | Refills: 5 | Status: SHIPPED | OUTPATIENT
Start: 2022-03-21

## 2022-03-25 ENCOUNTER — OFFICE VISIT (OUTPATIENT)
Dept: FAMILY MEDICINE CLINIC | Facility: CLINIC | Age: 87
End: 2022-03-25
Payer: MEDICARE

## 2022-03-25 VITALS
DIASTOLIC BLOOD PRESSURE: 66 MMHG | HEART RATE: 68 BPM | SYSTOLIC BLOOD PRESSURE: 124 MMHG | TEMPERATURE: 97.4 F | BODY MASS INDEX: 27.73 KG/M2 | HEIGHT: 60 IN

## 2022-03-25 DIAGNOSIS — D64.9 ANEMIA, UNSPECIFIED TYPE: ICD-10-CM

## 2022-03-25 DIAGNOSIS — J44.9 COPD, MODERATE (HCC): ICD-10-CM

## 2022-03-25 DIAGNOSIS — I50.32 CHRONIC DIASTOLIC CHF (CONGESTIVE HEART FAILURE) (HCC): ICD-10-CM

## 2022-03-25 DIAGNOSIS — R73.01 IMPAIRED FASTING GLUCOSE: Primary | ICD-10-CM

## 2022-03-25 DIAGNOSIS — I50.32 CHRONIC DIASTOLIC CONGESTIVE HEART FAILURE (HCC): ICD-10-CM

## 2022-03-25 DIAGNOSIS — J18.9 PNEUMONIA DUE TO INFECTIOUS ORGANISM, UNSPECIFIED LATERALITY, UNSPECIFIED PART OF LUNG: ICD-10-CM

## 2022-03-25 DIAGNOSIS — R26.2 AMBULATORY DYSFUNCTION: ICD-10-CM

## 2022-03-25 LAB — SL AMB POCT HEMOGLOBIN AIC: 5.7 (ref ?–6.5)

## 2022-03-25 PROCEDURE — 83036 HEMOGLOBIN GLYCOSYLATED A1C: CPT | Performed by: PHYSICIAN ASSISTANT

## 2022-03-25 PROCEDURE — 99495 TRANSJ CARE MGMT MOD F2F 14D: CPT | Performed by: PHYSICIAN ASSISTANT

## 2022-03-25 RX ORDER — BLOOD-GLUCOSE METER
KIT MISCELLANEOUS
Qty: 1 KIT | Refills: 0 | Status: SHIPPED | OUTPATIENT
Start: 2022-03-25

## 2022-03-25 RX ORDER — BLOOD SUGAR DIAGNOSTIC
STRIP MISCELLANEOUS
Qty: 400 EACH | Refills: 3 | Status: SHIPPED | OUTPATIENT
Start: 2022-03-25

## 2022-03-25 RX ORDER — FLUTICASONE FUROATE, UMECLIDINIUM BROMIDE AND VILANTEROL TRIFENATATE 100; 62.5; 25 UG/1; UG/1; UG/1
POWDER RESPIRATORY (INHALATION)
COMMUNITY

## 2022-03-25 RX ORDER — LANCETS 33 GAUGE
EACH MISCELLANEOUS
Qty: 400 EACH | Refills: 3 | Status: SHIPPED | OUTPATIENT
Start: 2022-03-25

## 2022-03-25 NOTE — PROGRESS NOTES
Assessment/Plan:     No problem-specific Assessment & Plan notes found for this encounter  Diagnoses and all orders for this visit:    Impaired fasting glucose  -     POCT hemoglobin A1c  -     Blood Glucose Monitoring Suppl (OneTouch Verio Reflect) w/Device KIT; Check blood sugars four times daily  Please substitute with appropriate alternative as covered by patient's insurance  Dx: E11 65  -     glucose blood (OneTouch Verio) test strip; Check blood sugars four times daily  Please substitute with appropriate alternative as covered by patient's insurance  Dx: E11 65  -     OneTouch Delica Lancets 55A MISC; Check blood sugars four times daily  Please substitute with appropriate alternative as covered by patient's insurance  Dx: E11 65    COPD, moderate ([address] )    Pneumonia due to infectious organism, unspecified laterality, unspecified part of lung  -     Comprehensive metabolic panel; Future  -     XR chest pa & lateral; Future    Anemia, unspecified type  -     CBC and differential; Future  -     Iron Panel (Includes Ferritin, Iron Sat%, Iron, and TIBC); Future    Chronic diastolic congestive heart failure (HCC)    Chronic diastolic CHF (congestive heart failure) (HCC)    Ambulatory dysfunction    Other orders  -     fluticasone-umeclidinium-vilanterol (Trelegy Ellipta) 100-62 5-25 MCG/INH inhaler; Trelegy Ellipta 100 mcg-62 5 mcg-25 mcg powder for inhalation        A1C 5 7% - discussed pre diabetic range  She will monitor her sugars at home  Glucometer and supplies sent to pharmacy  Continue close f/u cardiology and pulmonology   Repeat CXR in 4 weeks to ensure pneumonia has resolved- completed her course of abx    Subjective:     Patient ID: Amira Yost is a 82 y o  female  Patient presents for TCM  She is doing well since admission for COPD exacerbation and fall  Patient on 3L oxygen via [place] throughout the day   Follows with Dr Livier Ballard    Neck pain resolved from fall- saw orthopedics in the hospital  PT at home  Home nurse twice a week  Has life alert necklace  Patients Surinder Gallegos lives about 4 minutes away from her      Hospital Course:  Patient after being admitted was initiated on antibiotics as well as steroids and nebulizer  Orthopedic was consulted as well as cardiology  Nephrology also has been consulted and Lasix was placed on hold  Patient was seen by orthopedics and recommended there was no acute fracture and recommended: ALL injury, cervical spine, resolvingShe is not required to wear the cervical collar anymore given her symptoms  WB as tolerated  Exercises as tolerated  Please re-consult if needed  She may follow-up in 4-6 weeks if needed  Patient was seen by cardiology and initially had been placed on heparin drip and the elevated troponin was felt to be due to demand ischemia and they recommended: This is likely secondary to demand ischemia from underlying disease  She does have abnormal troponins and abnormal EKG  In view of this we will keep on IV heparin for 24 to 48 hours  Echo to assess ejection fraction  Given underlying pneumonia no ischemia evaluation at this time  Patient was also seen by nephrology and given acute kidney injury Lasix and ACE inhibitor's were held  Subsequently his creatinine function improved nephrology recommended patient can be resumed on Lasix and his Coreg but lisinopril was held given patient's blood pressure being on low side  She did improve from her pneumonia perspective and currently is doing well on oxygen which he utilizes at the nursing facility  She will be discharged back to skilled nursing facility and will complete oral antibiotic course  Review of Systems   Constitutional: Negative for chills, fatigue and fever  HENT: Negative for congestion, ear pain, sinus pain, sore throat and trouble swallowing  Eyes: Negative for pain, discharge and redness  Respiratory: Negative for cough, chest tightness, shortness of breath and wheezing  Cardiovascular: Negative for chest pain, palpitations and leg swelling  Gastrointestinal: Negative for abdominal pain, diarrhea, nausea and vomiting  Musculoskeletal: Negative for arthralgias, joint swelling and myalgias  Skin: Negative for rash  Neurological: Negative for dizziness, weakness, numbness and headaches  Objective:     Physical Exam  Vitals and nursing note reviewed  Constitutional:       General: She is not in acute distress  Appearance: Normal appearance  She is well-developed  Interventions: Nasal cannula in place  Cardiovascular:      Rate and Rhythm: Normal rate and regular rhythm  Pulmonary:      Effort: Pulmonary effort is normal       Breath sounds: Normal breath sounds  Abdominal:      General: Bowel sounds are normal       Palpations: Abdomen is soft  Abdomen is not rigid  Tenderness: There is no abdominal tenderness  There is no guarding or rebound  Negative signs include Nascimento's sign and McBurney's sign  Hernia: No hernia is present  Skin:     General: Skin is warm and dry  Vitals:    03/25/22 1104   BP: 124/66   Pulse: 68   Temp: (!) 97 4 °F (36 3 °C)   Height: 5' (1 524 m)       Transitional Care Management Review:  Neila Litten is a 82 y o  female here for TCM follow up       During the TCM phone call patient stated:    TCM Call (since 2/22/2022)     None      TCM Call (since 2/22/2022)     None          Christian Prince PA-C

## 2022-04-04 ENCOUNTER — TELEPHONE (OUTPATIENT)
Dept: LAB | Facility: HOSPITAL | Age: 87
End: 2022-04-04

## 2022-04-08 ENCOUNTER — LAB (OUTPATIENT)
Dept: LAB | Facility: HOSPITAL | Age: 87
End: 2022-04-08
Payer: MEDICARE

## 2022-04-08 DIAGNOSIS — D64.9 ANEMIA, UNSPECIFIED TYPE: ICD-10-CM

## 2022-04-08 DIAGNOSIS — Z13.220 NEED FOR LIPID SCREENING: ICD-10-CM

## 2022-04-08 DIAGNOSIS — J18.9 PNEUMONIA DUE TO INFECTIOUS ORGANISM, UNSPECIFIED LATERALITY, UNSPECIFIED PART OF LUNG: ICD-10-CM

## 2022-04-08 DIAGNOSIS — R73.01 IMPAIRED FASTING GLUCOSE: ICD-10-CM

## 2022-04-08 LAB
ALBUMIN SERPL BCP-MCNC: 4.1 G/DL (ref 3.5–5)
ALP SERPL-CCNC: 77 U/L (ref 46–116)
ALT SERPL W P-5'-P-CCNC: 32 U/L (ref 12–78)
ANION GAP SERPL CALCULATED.3IONS-SCNC: 4 MMOL/L (ref 4–13)
AST SERPL W P-5'-P-CCNC: 20 U/L (ref 5–45)
BASOPHILS # BLD AUTO: 0.03 THOUSANDS/ΜL (ref 0–0.1)
BASOPHILS NFR BLD AUTO: 1 % (ref 0–1)
BILIRUB SERPL-MCNC: 0.49 MG/DL (ref 0.2–1)
BUN SERPL-MCNC: 15 MG/DL (ref 5–25)
CALCIUM SERPL-MCNC: 9.6 MG/DL (ref 8.3–10.1)
CHLORIDE SERPL-SCNC: 101 MMOL/L (ref 100–108)
CHOLEST SERPL-MCNC: 162 MG/DL
CO2 SERPL-SCNC: 30 MMOL/L (ref 21–32)
CREAT SERPL-MCNC: 0.6 MG/DL (ref 0.6–1.3)
EOSINOPHIL # BLD AUTO: 0.25 THOUSAND/ΜL (ref 0–0.61)
EOSINOPHIL NFR BLD AUTO: 4 % (ref 0–6)
ERYTHROCYTE [DISTWIDTH] IN BLOOD BY AUTOMATED COUNT: 13.3 % (ref 11.6–15.1)
EST. AVERAGE GLUCOSE BLD GHB EST-MCNC: 103 MG/DL
FERRITIN SERPL-MCNC: 218 NG/ML (ref 8–388)
GFR SERPL CREATININE-BSD FRML MDRD: 83 ML/MIN/1.73SQ M
GLUCOSE P FAST SERPL-MCNC: 104 MG/DL (ref 65–99)
HBA1C MFR BLD: 5.2 %
HCT VFR BLD AUTO: 32.3 % (ref 34.8–46.1)
HDLC SERPL-MCNC: 98 MG/DL
HGB BLD-MCNC: 10.1 G/DL (ref 11.5–15.4)
IMM GRANULOCYTES # BLD AUTO: 0.02 THOUSAND/UL (ref 0–0.2)
IMM GRANULOCYTES NFR BLD AUTO: 0 % (ref 0–2)
IRON SATN MFR SERPL: 27 % (ref 15–50)
IRON SERPL-MCNC: 70 UG/DL (ref 50–170)
LDLC SERPL CALC-MCNC: 51 MG/DL (ref 0–100)
LYMPHOCYTES # BLD AUTO: 1.24 THOUSANDS/ΜL (ref 0.6–4.47)
LYMPHOCYTES NFR BLD AUTO: 21 % (ref 14–44)
MCH RBC QN AUTO: 31.4 PG (ref 26.8–34.3)
MCHC RBC AUTO-ENTMCNC: 31.3 G/DL (ref 31.4–37.4)
MCV RBC AUTO: 100 FL (ref 82–98)
MONOCYTES # BLD AUTO: 0.75 THOUSAND/ΜL (ref 0.17–1.22)
MONOCYTES NFR BLD AUTO: 13 % (ref 4–12)
NEUTROPHILS # BLD AUTO: 3.68 THOUSANDS/ΜL (ref 1.85–7.62)
NEUTS SEG NFR BLD AUTO: 61 % (ref 43–75)
NONHDLC SERPL-MCNC: 64 MG/DL
NRBC BLD AUTO-RTO: 0 /100 WBCS
PLATELET # BLD AUTO: 259 THOUSANDS/UL (ref 149–390)
PMV BLD AUTO: 10.6 FL (ref 8.9–12.7)
POTASSIUM SERPL-SCNC: 4.3 MMOL/L (ref 3.5–5.3)
PROT SERPL-MCNC: 7.1 G/DL (ref 6.4–8.2)
RBC # BLD AUTO: 3.22 MILLION/UL (ref 3.81–5.12)
SODIUM SERPL-SCNC: 135 MMOL/L (ref 136–145)
TIBC SERPL-MCNC: 263 UG/DL (ref 250–450)
TRIGL SERPL-MCNC: 63 MG/DL
WBC # BLD AUTO: 5.97 THOUSAND/UL (ref 4.31–10.16)

## 2022-04-08 PROCEDURE — 36415 COLL VENOUS BLD VENIPUNCTURE: CPT

## 2022-04-08 PROCEDURE — 85025 COMPLETE CBC W/AUTO DIFF WBC: CPT

## 2022-04-08 PROCEDURE — 80061 LIPID PANEL: CPT

## 2022-04-08 PROCEDURE — 83036 HEMOGLOBIN GLYCOSYLATED A1C: CPT

## 2022-04-08 PROCEDURE — 83540 ASSAY OF IRON: CPT

## 2022-04-08 PROCEDURE — 83550 IRON BINDING TEST: CPT

## 2022-04-08 PROCEDURE — 82728 ASSAY OF FERRITIN: CPT

## 2022-04-08 PROCEDURE — 80053 COMPREHEN METABOLIC PANEL: CPT

## 2022-04-25 ENCOUNTER — TELEPHONE (OUTPATIENT)
Dept: FAMILY MEDICINE CLINIC | Facility: CLINIC | Age: 87
End: 2022-04-25

## 2022-04-25 NOTE — TELEPHONE ENCOUNTER
Nurse from [facility] called in with report on patient  She stated that patient has had an increase in 2 lbs, + 2 pitting edema in LE L>R - also the Nicas testing that they do on patient weekly came elizabeth positive for the start of CHF  Patient claims to be taking lasix 40 mg - they are trying to avoid a hospitalization

## 2022-04-26 DIAGNOSIS — I50.32 CHRONIC DIASTOLIC CHF (CONGESTIVE HEART FAILURE) (HCC): Primary | ICD-10-CM

## 2022-04-26 NOTE — TELEPHONE ENCOUNTER
Recommend for her to take an extra dose of lasix 40 mg for 5 days    Also recommend blood work and Chest X-ray ordered  If she develops SOB or worsening symptoms recommend going to the ER

## 2022-05-07 DIAGNOSIS — E78.2 MIXED HYPERLIPIDEMIA: ICD-10-CM

## 2022-05-07 DIAGNOSIS — Z91.09 ENVIRONMENTAL ALLERGIES: ICD-10-CM

## 2022-05-09 RX ORDER — ATORVASTATIN CALCIUM 80 MG/1
TABLET, FILM COATED ORAL
Qty: 90 TABLET | Refills: 3 | Status: SHIPPED | OUTPATIENT
Start: 2022-05-09

## 2022-05-09 RX ORDER — MONTELUKAST SODIUM 10 MG/1
TABLET ORAL
Qty: 90 TABLET | Refills: 1 | Status: SHIPPED | OUTPATIENT
Start: 2022-05-09

## 2022-05-10 ENCOUNTER — TELEPHONE (OUTPATIENT)
Dept: FAMILY MEDICINE CLINIC | Facility: CLINIC | Age: 87
End: 2022-05-10

## 2022-05-10 DIAGNOSIS — I21.4 NSTEMI (NON-ST ELEVATED MYOCARDIAL INFARCTION) (HCC): Primary | ICD-10-CM

## 2022-05-10 RX ORDER — CLOPIDOGREL BISULFATE 75 MG/1
75 TABLET ORAL DAILY
Qty: 30 TABLET | Refills: 28 | Status: SHIPPED | OUTPATIENT
Start: 2022-05-10 | End: 2024-09-18

## 2022-05-10 NOTE — TELEPHONE ENCOUNTER
Milagro is requesting a prescription for Clopidogrel 75 mg once a day  It kahtleen like Dr Shawn Talley was filling it

## 2022-05-17 ENCOUNTER — TELEPHONE (OUTPATIENT)
Dept: FAMILY MEDICINE CLINIC | Facility: CLINIC | Age: 87
End: 2022-05-17

## 2022-05-17 NOTE — TELEPHONE ENCOUNTER
Noemi from [address] [facility] called to say that the patient is requesting discharge from services  Tyler Gravely just wanted you to be aware  Please call Noemi back to let her know you are ok with this

## 2022-06-03 ENCOUNTER — RA CDI HCC (OUTPATIENT)
Dept: OTHER | Facility: HOSPITAL | Age: 87
End: 2022-06-03

## 2022-08-22 ENCOUNTER — TELEPHONE (OUTPATIENT)
Dept: LAB | Facility: HOSPITAL | Age: 87
End: 2022-08-22

## 2022-08-26 ENCOUNTER — APPOINTMENT (OUTPATIENT)
Dept: LAB | Facility: HOSPITAL | Age: 87
End: 2022-08-26
Payer: MEDICARE

## 2022-08-26 DIAGNOSIS — I50.32 CHRONIC DIASTOLIC CHF (CONGESTIVE HEART FAILURE) (HCC): ICD-10-CM

## 2022-08-26 LAB
ANION GAP SERPL CALCULATED.3IONS-SCNC: 4 MMOL/L (ref 4–13)
BUN SERPL-MCNC: 19 MG/DL (ref 5–25)
CALCIUM SERPL-MCNC: 9.5 MG/DL (ref 8.3–10.1)
CHLORIDE SERPL-SCNC: 94 MMOL/L (ref 96–108)
CO2 SERPL-SCNC: 31 MMOL/L (ref 21–32)
CREAT SERPL-MCNC: 0.68 MG/DL (ref 0.6–1.3)
GFR SERPL CREATININE-BSD FRML MDRD: 80 ML/MIN/1.73SQ M
GLUCOSE P FAST SERPL-MCNC: 98 MG/DL (ref 65–99)
NT-PROBNP SERPL-MCNC: 964 PG/ML
POTASSIUM SERPL-SCNC: 4.2 MMOL/L (ref 3.5–5.3)
SODIUM SERPL-SCNC: 129 MMOL/L (ref 135–147)

## 2022-08-26 PROCEDURE — 80048 BASIC METABOLIC PNL TOTAL CA: CPT

## 2022-08-26 PROCEDURE — 36415 COLL VENOUS BLD VENIPUNCTURE: CPT

## 2022-08-26 PROCEDURE — 83880 ASSAY OF NATRIURETIC PEPTIDE: CPT

## 2022-09-15 DIAGNOSIS — I10 ESSENTIAL HYPERTENSION: ICD-10-CM

## 2022-09-15 RX ORDER — CARVEDILOL 3.12 MG/1
TABLET ORAL
Qty: 120 TABLET | Refills: 5 | Status: SHIPPED | OUTPATIENT
Start: 2022-09-15

## 2022-11-09 ENCOUNTER — NURSING HOME VISIT (OUTPATIENT)
Dept: NEPHROLOGY | Facility: CLINIC | Age: 87
End: 2022-11-09

## 2022-11-09 VITALS
TEMPERATURE: 98.1 F | OXYGEN SATURATION: 97 % | DIASTOLIC BLOOD PRESSURE: 62 MMHG | SYSTOLIC BLOOD PRESSURE: 110 MMHG | HEART RATE: 56 BPM

## 2022-11-09 DIAGNOSIS — Z13.220 NEED FOR LIPID SCREENING: ICD-10-CM

## 2022-11-09 DIAGNOSIS — I51.9 CHRONIC SYSTOLIC DYSFUNCTION OF LEFT VENTRICLE: ICD-10-CM

## 2022-11-09 DIAGNOSIS — R79.89 ABNORMAL TSH: ICD-10-CM

## 2022-11-09 DIAGNOSIS — J43.9 PULMONARY EMPHYSEMA, UNSPECIFIED EMPHYSEMA TYPE (HCC): ICD-10-CM

## 2022-11-09 DIAGNOSIS — I50.32 CHRONIC DIASTOLIC CHF (CONGESTIVE HEART FAILURE) (HCC): ICD-10-CM

## 2022-11-09 DIAGNOSIS — R26.2 AMBULATORY DYSFUNCTION: ICD-10-CM

## 2022-11-09 DIAGNOSIS — I10 ESSENTIAL HYPERTENSION: ICD-10-CM

## 2022-11-09 DIAGNOSIS — R73.01 IMPAIRED FASTING GLUCOSE: Primary | ICD-10-CM

## 2022-11-09 NOTE — PROGRESS NOTES
[address] [facility] [place], [place]    Name: Ger Ha  YOB: 1935      Assessment/Plan:       Problem List Items Addressed This Visit        Endocrine    Impaired fasting glucose - Primary      Check an A1c            Respiratory    Pulmonary emphysema (HCC)      Continue oxygen at 3 liters/minutes            Cardiovascular and Mediastinum    Chronic systolic dysfunction of left ventricle      Followed by Cardiology  Does take Lasix         Chronic diastolic CHF (congestive heart failure) ([address] )    Essential hypertension      Blood pressure is well controlled on low-dose carvedilol            Other    Abnormal TSH      Check a TSH and T4         Need for lipid screening      Lipid profile         Ambulatory dysfunction            Subjective:      Patient ID: Ger Ha is a 82 y o  female  HPI   Has a long history of COPD and emphysema and is on oxygen 3 liters/minute continuous  She has a history of MI remotely, ambulatory dysfunction  She has a history of frequent falls and uses a walker  She has type 2 diabetes mellitus but she was unaware of this  She has hyperlipidemia and primary hypertension  She was told she had valvular disease and oasis stents could be placed  At was not clear which he was talking about but she has been on Plavix since  She is a new patient to [facility]    The following portions of the patient's history were reviewed and updated as appropriate: allergies, current medications, past family history, past medical history, past social history, past surgical history and problem list     Review of Systems   Constitutional: Negative for fatigue  HENT: Positive for hearing loss  Eyes: Positive for visual disturbance  Respiratory: Negative for cough and shortness of breath  Cardiovascular: Negative for chest pain, palpitations and leg swelling  Gastrointestinal: Positive for diarrhea   Negative for abdominal pain, blood in stool and constipation  Genitourinary: Negative for decreased urine volume, difficulty urinating, dysuria and urgency  Stress  incontinent   Musculoskeletal: Positive for arthralgias  Neurological: Positive for weakness  Negative for dizziness and headaches  Hematological: Does not bruise/bleed easily  Psychiatric/Behavioral: Negative for dysphoric mood           Social History     Socioeconomic History   • Marital status: /Civil Union     Spouse name: None   • Number of children: None   • Years of education: None   • Highest education level: None   Occupational History   • None   Tobacco Use   • Smoking status: Former Smoker   • Smokeless tobacco: Never Used   Substance and Sexual Activity   • Alcohol use: No   • Drug use: No   • Sexual activity: None   Other Topics Concern   • None   Social History Narrative   • None     Social Determinants of Health     Financial Resource Strain: Not on file   Food Insecurity: Not on file   Transportation Needs: Not on file   Physical Activity: Not on file   Stress: Not on file   Social Connections: Not on file   Intimate Partner Violence: Not on file   Housing Stability: Not on file     Past Medical History:   Diagnosis Date   • Cardiac disease    • CHF (congestive heart failure) ([facility] )    • Diabetes mellitus ([facility] )    • Emphysema lung ([facility] )    • History of fall    • Hyperlipidemia    • Hypertension    • Incontinence in female    • Pulmonary emphysema ([facility] )    • Wound infection after surgery      Past Surgical History:   Procedure Laterality Date   • APPENDECTOMY     •  SECTION     • INCISION AND DRAINAGE POSTERIOR SPINE N/A 2017    Procedure: THORACIC WOUND EXPLORATION, WASHOUT, DEBRIDEMENT and CLOSURE;  Surgeon: Arnold Jeffers MD;  [facility];  Service: Orthopedics   • LUMBAR FUSION N/A 2017    Procedure: T3-T8 Posterior Spinal Fusion;  Surgeon: Arnold Jeffers MD;  [facility];  Service:    • GA I&D, POST SPINE, CERV/THOR/CERV-THOR N/A 11/28/2017    Procedure: I&D AND DEBRIDEMENT  of deep  of thoracic spine WOUND;  Surgeon: Avi George MD;  [facility];  Service: Orthopedics       Current Outpatient Medications:   •  albuterol (2 5 mg/3 mL) 0 083 % nebulizer solution, Take 2 5 mg by nebulization every 6 (six) hours as needed for wheezing, Disp: , Rfl:   •  aspirin 81 mg chewable tablet, Chew 81 mg, Disp: , Rfl:   •  atorvastatin (LIPITOR) 80 mg tablet, TAKE ONE TABLET BY MOUTH EVERY DAY, Disp: 90 tablet, Rfl: 3  •  b complex vitamins tablet, Take 1 tablet by mouth daily, Disp: , Rfl:   •  Blood Glucose Monitoring Suppl (OneTouch Verio Reflect) w/Device KIT, Check blood sugars four times daily  Please substitute with appropriate alternative as covered by patient's insurance   Dx: E11 65, Disp: 1 kit, Rfl: 0  •  carvedilol (COREG) 3 125 mg tablet, TAKE ONE TABLET BY MOUTH TWICE A DAY WITH MEALS, Disp: 120 tablet, Rfl: 5  •  cholecalciferol (VITAMIN D3) 1,000 units tablet, Take 1,000 Units by mouth daily, Disp: , Rfl:   •  clopidogrel (PLAVIX) 75 mg tablet, Take 1 tablet (75 mg total) by mouth daily, Disp: 30 tablet, Rfl: 28  •  Coenzyme Q10 (COQ10) 100 MG CAPS, Take 300 mg by mouth daily  , Disp: , Rfl:   •  ferrous sulfate 324 (65 Fe) mg, Take 1 tablet (324 mg total) by mouth 2 (two) times a day before meals, Disp: 60 tablet, Rfl: 2  •  fluticasone (FLONASE) 50 mcg/act nasal spray, 1 spray into each nostril daily, Disp: 16 g, Rfl: 0  •  fluticasone-umeclidinium-vilanterol (TRELEGY ELLIPTA) 100-62 5-25 MCG/INH inhaler, Inhale 1 puff daily Rinse mouth after use , Disp: 2 Inhaler, Rfl: 0  •  fluticasone-umeclidinium-vilanterol (Trelegy Ellipta) 100-62 5-25 MCG/INH inhaler, Trelegy Ellipta 100 mcg-62 5 mcg-25 mcg powder for inhalation, Disp: , Rfl:   •  furosemide (LASIX) 40 mg tablet, TAKE ONE TABLET BY MOUTH EVERY DAY, Disp: 90 tablet, Rfl: 1  •  gabapentin (NEURONTIN) 300 mg capsule, TAKE ONE CAPSULE BY MOUTH EVERY DAY, Disp: 90 capsule, Rfl: 3  •  glucose blood (OneTouch Verio) test strip, Check blood sugars four times daily  Please substitute with appropriate alternative as covered by patient's insurance  Dx: E11 65, Disp: 400 each, Rfl: 3  •  hydrOXYzine HCL (ATARAX) 25 mg tablet, Take 1 tablet (25 mg total) by mouth every 6 (six) hours as needed for itching, Disp: 30 tablet, Rfl: 0  •  levalbuterol (XOPENEX) 0 63 mg/3 mL nebulizer solution, Take 0 63 mg by nebulization every 4 (four) hours as needed for wheezing, Disp: , Rfl:   •  lisinopril (ZESTRIL) 2 5 mg tablet, TAKE ONE TABLET BY MOUTH EVERY DAY AT BEDTIME, Disp: 30 tablet, Rfl: 5  •  montelukast (SINGULAIR) 10 mg tablet, TAKE ONE TABLET BY MOUTH EVERY DAY AT BEDTIME, Disp: 90 tablet, Rfl: 1  •  OneTouch Delica Lancets 33N MISC, Check blood sugars four times daily  Please substitute with appropriate alternative as covered by patient's insurance   Dx: E11 65, Disp: 400 each, Rfl: 3  •  Respiratory Therapy Supplies (NEBULIZER/TUBING/MOUTHPIECE) KIT, by Does not apply route every 4 (four) hours as needed (shortness of breath), Disp: 1 each, Rfl: 1  •  tiotropium (SPIRIVA) 18 mcg inhalation capsule, Place 18 mcg into inhaler and inhale as needed  , Disp: , Rfl:   •  triamcinolone (KENALOG) 0 5 % cream, Apply topically 3 (three) times a day, Disp: 30 g, Rfl: 2  •  Ventolin  (90 Base) MCG/ACT inhaler, , Disp: , Rfl:     Lab Results   Component Value Date    SODIUM 129 (L) 08/26/2022    K 4 2 08/26/2022    CL 94 (L) 08/26/2022    CO2 31 08/26/2022    AGAP 4 08/26/2022    BUN 19 08/26/2022    CREATININE 0 68 08/26/2022    GLUC 90 11/29/2017    GLUF 98 08/26/2022    CALCIUM 9 5 08/26/2022    AST 20 04/08/2022    ALT 32 04/08/2022    ALKPHOS 77 04/08/2022    TP 7 1 04/08/2022    TBILI 0 49 04/08/2022    EGFR 80 08/26/2022     Lab Results   Component Value Date    WBC 5 97 04/08/2022    HGB 10 1 (L) 04/08/2022    HCT 32 3 (L) 04/08/2022     (H) 04/08/2022     04/08/2022     Lab Results   Component Value Date    CHOLESTEROL 162 04/08/2022    CHOLESTEROL 196 05/10/2021    CHOLESTEROL 157 03/12/2019     Lab Results   Component Value Date    HDL 98 04/08/2022     05/10/2021    HDL 94 (H) 03/12/2019     Lab Results   Component Value Date    LDLCALC 51 04/08/2022    LDLCALC 77 05/10/2021    LDLCALC 52 03/12/2019     Lab Results   Component Value Date    TRIG 63 04/08/2022    TRIG 62 05/10/2021    TRIG 56 03/12/2019     No results found for: CHOLHDL  No results found for: [id number], TSH  Lab Results   Component Value Date    CALCIUM 9 5 08/26/2022     No results found for: SPEP, UPEP  No results found for: MICROALCHRISSIE [id number]        Objective:      /62   Pulse 56   Temp 98 1 °F (36 7 °C)   SpO2 97%          Physical Exam  Vitals reviewed  Constitutional:       Appearance: She is obese  HENT:      Head: Normocephalic and atraumatic  Right Ear: External ear normal       Left Ear: External ear normal    Eyes:      Extraocular Movements: Extraocular movements intact  Conjunctiva/sclera: Conjunctivae normal       Pupils: Pupils are equal, round, and reactive to light  Cardiovascular:      Rate and Rhythm: Normal rate and regular rhythm  Pulmonary:      Effort: Pulmonary effort is normal       Comments: Distant breath sounds bilateral  Abdominal:      General: Bowel sounds are normal  There is no distension  Palpations: Abdomen is soft  Tenderness: There is no abdominal tenderness  Musculoskeletal:      Right lower leg: No edema  Left lower leg: No edema  Skin:     General: Skin is warm and dry  Neurological:      General: No focal deficit present  Mental Status: She is alert  Mental status is at baseline  Motor: Weakness present  Gait: Gait abnormal    Psychiatric:         Mood and Affect: Mood normal          Behavior: Behavior normal          Thought Content:  Thought content normal  Judgment: Judgment normal

## 2022-11-18 DIAGNOSIS — Z91.09 ENVIRONMENTAL ALLERGIES: ICD-10-CM

## 2022-11-18 RX ORDER — MONTELUKAST SODIUM 10 MG/1
TABLET ORAL
Qty: 90 TABLET | Refills: 1 | Status: SHIPPED | OUTPATIENT
Start: 2022-11-18

## 2022-11-23 LAB — HBA1C MFR BLD HPLC: 6.3 %

## 2022-12-08 ENCOUNTER — NURSING HOME VISIT (OUTPATIENT)
Dept: NEPHROLOGY | Facility: CLINIC | Age: 87
End: 2022-12-08

## 2022-12-08 VITALS
HEART RATE: 84 BPM | DIASTOLIC BLOOD PRESSURE: 66 MMHG | WEIGHT: 130.5 LBS | SYSTOLIC BLOOD PRESSURE: 108 MMHG | BODY MASS INDEX: 25.49 KG/M2 | OXYGEN SATURATION: 99 % | TEMPERATURE: 97.7 F | RESPIRATION RATE: 16 BRPM

## 2022-12-08 DIAGNOSIS — J44.1 COPD EXACERBATION (HCC): ICD-10-CM

## 2022-12-08 DIAGNOSIS — J44.9 COPD, MODERATE (HCC): ICD-10-CM

## 2022-12-08 DIAGNOSIS — J10.1 INFLUENZA A: ICD-10-CM

## 2022-12-08 DIAGNOSIS — I50.32 CHRONIC DIASTOLIC CHF (CONGESTIVE HEART FAILURE) (HCC): Primary | ICD-10-CM

## 2022-12-08 DIAGNOSIS — I10 ESSENTIAL HYPERTENSION: ICD-10-CM

## 2022-12-08 DIAGNOSIS — R73.01 IMPAIRED FASTING GLUCOSE: ICD-10-CM

## 2022-12-08 NOTE — ASSESSMENT & PLAN NOTE
Wt Readings from Last 3 Encounters:   12/08/22 59 2 kg (130 lb 8 oz)   08/06/21 64 4 kg (142 lb)   01/20/21 62 6 kg (138 lb)     Patient is currently compensated, continue to monitor closely, encourage low-sodium diet, continue with diuretic as indicated

## 2022-12-08 NOTE — PROGRESS NOTES
[facility] [place], [place]    Name: Weston Ovalle  YOB: 1935      Assessment/Plan:    COPD exacerbation [facility])  She continues to experience COPD exacerbation at this time, will add inhaled budesonide twice daily x1 month, will extend the patient's prednisone taper from a reduction of 10 mg every 2 days to reduction in prednisone by 10 mg every 3 days  Continue with albuterol nebulizers 4 times a day  We will continue closely monitor the patient from a clinical standpoint  Patient high risk for readmission  Influenza A  Continue with treatment with Tamiflu for full course, patient has an additional 6 days of treatment  Chronic diastolic CHF (congestive heart failure) (HCC)  Wt Readings from Last 3 Encounters:   12/08/22 59 2 kg (130 lb 8 oz)   08/06/21 64 4 kg (142 lb)   01/20/21 62 6 kg (138 lb)     Patient is currently compensated, continue to monitor closely, encourage low-sodium diet, continue with diuretic as indicated  Essential hypertension  Blood pressures are well controlled at this time, continue with current medications  Impaired fasting glucose  Continue with metformin, follow-up blood work at regular intervals  Problem List Items Addressed This Visit        Endocrine    Impaired fasting glucose     Continue with metformin, follow-up blood work at regular intervals  Respiratory    COPD, moderate ([address] )    COPD exacerbation ([address] )     She continues to experience COPD exacerbation at this time, will add inhaled budesonide twice daily x1 month, will extend the patient's prednisone taper from a reduction of 10 mg every 2 days to reduction in prednisone by 10 mg every 3 days  Continue with albuterol nebulizers 4 times a day  We will continue closely monitor the patient from a clinical standpoint  Patient high risk for readmission           Influenza A     Continue with treatment with Tamiflu for full course, patient has an additional 6 days of treatment  Cardiovascular and Mediastinum    Chronic diastolic CHF (congestive heart failure) (HCC) - Primary     Wt Readings from Last 3 Encounters:   12/08/22 59 2 kg (130 lb 8 oz)   08/06/21 64 4 kg (142 lb)   01/20/21 62 6 kg (138 lb)     Patient is currently compensated, continue to monitor closely, encourage low-sodium diet, continue with diuretic as indicated  Essential hypertension     Blood pressures are well controlled at this time, continue with current medications  Patient currently at high risk for readmission to the hospital due to COPD exacerbation which remains ongoing  We will reverse taper of steroids and increase prednisone and then continue to reduce any suppressive taper by 10 mg down every 3 days instead of every 2 days  Continue nebulizer treatments for at least 1 month, and also add budesonide via nebulizer  Subjective:      Patient ID: Flor Hernandes is a 82 y o  female  Patient presents for re-evaluation  Patient had a admission at [facility], [address], for a COPD exacerbation associated with influenza a  Patient has been back at the assisted living facility since Monday, December 5  She was to continue to stay on Tamiflu for an additional 10 days upon discharge  Patient was apparently on IV Solu-Medrol in the hospital, she was converted to oral prednisone which has been reduced by 10 mg every 2 days  Patient unfortunately continues to worsen at this time, continues to complain of wheezing associated with a productive cough  She complains of feeling weak, and occasionally has difficulty breathing  Patient is taking all other medications as prescribed at this time, with no specific issues or complaints    She currently remains on azithromycin, this was started on December 6, Tuesday, after coming back from the hospital       The following portions of the patient's history were reviewed and updated as appropriate: allergies, current medications, past family history, past medical history, past social history, past surgical history and problem list     Review of Systems   All other systems reviewed and are negative          Social History     Socioeconomic History   • Marital status: /Civil Union     Spouse name: Not on file   • Number of children: Not on file   • Years of education: Not on file   • Highest education level: Not on file   Occupational History   • Not on file   Tobacco Use   • Smoking status: Former   • Smokeless tobacco: Never   Substance and Sexual Activity   • Alcohol use: No   • Drug use: No   • Sexual activity: Not on file   Other Topics Concern   • Not on file   Social History Narrative   • Not on file     Social Determinants of Health     Financial Resource Strain: Not on file   Food Insecurity: Not on file   Transportation Needs: Not on file   Physical Activity: Not on file   Stress: Not on file   Social Connections: Not on file   Intimate Partner Violence: Not on file   Housing Stability: Not on file     Past Medical History:   Diagnosis Date   • Cardiac disease    • CHF (congestive heart failure) ([facility] )    • Diabetes mellitus ([facility] )    • [facility] )    • History of fall    • Hyperlipidemia    • Hypertension    • Incontinence in female    • Pulmonary emphysema ([facility] )    • Wound infection after surgery      Past Surgical History:   Procedure Laterality Date   • APPENDECTOMY     •  SECTION     • INCISION AND DRAINAGE POSTERIOR SPINE N/A 2017    Procedure: THORACIC WOUND EXPLORATION, WASHOUT, DEBRIDEMENT and CLOSURE;  Surgeon: Suzy Bruno MD;  [facility];  Service: Orthopedics   • LUMBAR FUSION N/A 2017    Procedure: T3-T8 Posterior Spinal Fusion;  Surgeon: Suzy Bruno MD;  [facility];  Service:    • CT I&D, POST SPINE, CERV/THOR/CERV-THOR N/A 2017    Procedure: I&D AND DEBRIDEMENT  of deep  of thoracic spine WOUND;  Surgeon: Chad Ritchie MD;  [facility];  Service: Orthopedics       Current Outpatient Medications:   •  albuterol (2 5 mg/3 mL) 0 083 % nebulizer solution, Take 2 5 mg by nebulization every 6 (six) hours as needed for wheezing, Disp: , Rfl:   •  aspirin 81 mg chewable tablet, Chew 81 mg, Disp: , Rfl:   •  atorvastatin (LIPITOR) 80 mg tablet, TAKE ONE TABLET BY MOUTH EVERY DAY, Disp: 90 tablet, Rfl: 3  •  b complex vitamins tablet, Take 1 tablet by mouth daily, Disp: , Rfl:   •  Blood Glucose Monitoring Suppl (OneTouch Verio Reflect) w/Device KIT, Check blood sugars four times daily  Please substitute with appropriate alternative as covered by patient's insurance   Dx: E11 65, Disp: 1 kit, Rfl: 0  •  carvedilol (COREG) 3 125 mg tablet, TAKE ONE TABLET BY MOUTH TWICE A DAY WITH MEALS, Disp: 120 tablet, Rfl: 5  •  cholecalciferol (VITAMIN D3) 1,000 units tablet, Take 1,000 Units by mouth daily, Disp: , Rfl:   •  clopidogrel (PLAVIX) 75 mg tablet, Take 1 tablet (75 mg total) by mouth daily, Disp: 30 tablet, Rfl: 28  •  Coenzyme Q10 (COQ10) 100 MG CAPS, Take 300 mg by mouth daily  , Disp: , Rfl:   •  ferrous sulfate 324 (65 Fe) mg, Take 1 tablet (324 mg total) by mouth 2 (two) times a day before meals, Disp: 60 tablet, Rfl: 2  •  fluticasone (FLONASE) 50 mcg/act nasal spray, 1 spray into each nostril daily, Disp: 16 g, Rfl: 0  •  fluticasone-umeclidinium-vilanterol (TRELEGY ELLIPTA) 100-62 5-25 MCG/INH inhaler, Inhale 1 puff daily Rinse mouth after use , Disp: 2 Inhaler, Rfl: 0  •  fluticasone-umeclidinium-vilanterol (Trelegy Ellipta) 100-62 5-25 MCG/INH inhaler, Trelegy Ellipta 100 mcg-62 5 mcg-25 mcg powder for inhalation, Disp: , Rfl:   •  furosemide (LASIX) 40 mg tablet, TAKE ONE TABLET BY MOUTH EVERY DAY, Disp: 90 tablet, Rfl: 1  •  gabapentin (NEURONTIN) 300 mg capsule, TAKE ONE CAPSULE BY MOUTH EVERY DAY, Disp: 90 capsule, Rfl: 3  •  glucose blood (OneTouch Verio) test strip, Check blood sugars four times daily  Please substitute with appropriate alternative as covered by patient's insurance  Dx: E11 65, Disp: 400 each, Rfl: 3  •  hydrOXYzine HCL (ATARAX) 25 mg tablet, Take 1 tablet (25 mg total) by mouth every 6 (six) hours as needed for itching, Disp: 30 tablet, Rfl: 0  •  levalbuterol (XOPENEX) 0 63 mg/3 mL nebulizer solution, Take 0 63 mg by nebulization every 4 (four) hours as needed for wheezing, Disp: , Rfl:   •  lisinopril (ZESTRIL) 2 5 mg tablet, TAKE ONE TABLET BY MOUTH EVERY DAY AT BEDTIME, Disp: 30 tablet, Rfl: 5  •  montelukast (SINGULAIR) 10 mg tablet, TAKE ONE TABLET BY MOUTH AT BEDTIME, Disp: 90 tablet, Rfl: 1  •  OneTouch Delica Lancets 11F MISC, Check blood sugars four times daily  Please substitute with appropriate alternative as covered by patient's insurance   Dx: E11 65, Disp: 400 each, Rfl: 3  •  Respiratory Therapy Supplies (NEBULIZER/TUBING/MOUTHPIECE) KIT, by Does not apply route every 4 (four) hours as needed (shortness of breath), Disp: 1 each, Rfl: 1  •  tiotropium (SPIRIVA) 18 mcg inhalation capsule, Place 18 mcg into inhaler and inhale as needed  , Disp: , Rfl:   •  triamcinolone (KENALOG) 0 5 % cream, Apply topically 3 (three) times a day, Disp: 30 g, Rfl: 2  •  Ventolin  (90 Base) MCG/ACT inhaler, , Disp: , Rfl:     Lab Results   Component Value Date    SODIUM 129 (L) 08/26/2022    K 4 2 08/26/2022    CL 94 (L) 08/26/2022    CO2 31 08/26/2022    AGAP 4 08/26/2022    BUN 19 08/26/2022    CREATININE 0 68 08/26/2022    GLUC 90 11/29/2017    GLUF 98 08/26/2022    CALCIUM 9 5 08/26/2022    AST 20 04/08/2022    ALT 32 04/08/2022    ALKPHOS 77 04/08/2022    TP 7 1 04/08/2022    TBILI 0 49 04/08/2022    EGFR 80 08/26/2022     Lab Results   Component Value Date    WBC 5 97 04/08/2022    HGB 10 1 (L) 04/08/2022    HCT 32 3 (L) 04/08/2022     (H) 04/08/2022     04/08/2022     Lab Results   Component Value Date    CHOLESTEROL 162 04/08/2022    CHOLESTEROL 196 05/10/2021    CHOLESTEROL 157 03/12/2019     Lab Results   Component Value Date    HDL 98 04/08/2022     05/10/2021    HDL 94 (H) 03/12/2019     Lab Results   Component Value Date    LDLCALC 51 04/08/2022    LDLCALC 77 05/10/2021    LDLCALC 52 03/12/2019     Lab Results   Component Value Date    TRIG 63 04/08/2022    TRIG 62 05/10/2021    TRIG 56 03/12/2019     No results found for: CHOLHDL  No results found for: [id number], TSH  Lab Results   Component Value Date    CALCIUM 9 5 08/26/2022     No results found for: SPEP, UPEP  No results found for: MICROALBUR, [id number]        Objective:      /66   Pulse 84   Temp 97 7 °F (36 5 °C)   Resp 16   Wt 59 2 kg (130 lb 8 oz)   SpO2 99%   BMI 25 49 kg/m²          Physical Exam  Vitals reviewed  Constitutional:       General: She is not in acute distress  Appearance: She is well-developed  HENT:      Head: Normocephalic and atraumatic  Eyes:      Conjunctiva/sclera: Conjunctivae normal    Cardiovascular:      Rate and Rhythm: Normal rate and regular rhythm  Pulmonary:      Effort: Pulmonary effort is normal       Comments: Reduced breath sounds bilaterally  Abdominal:      Palpations: Abdomen is soft  Musculoskeletal:      Cervical back: Neck supple  Skin:     General: Skin is warm  Findings: No rash  Neurological:      Mental Status: She is alert and oriented to person, place, and time  Cranial Nerves: No cranial nerve deficit     Psychiatric:         Behavior: Behavior normal

## 2022-12-08 NOTE — ASSESSMENT & PLAN NOTE
Continue with treatment with Tamiflu for full course, patient has an additional 6 days of treatment

## 2022-12-08 NOTE — ASSESSMENT & PLAN NOTE
She continues to experience COPD exacerbation at this time, will add inhaled budesonide twice daily x1 month, will extend the patient's prednisone taper from a reduction of 10 mg every 2 days to reduction in prednisone by 10 mg every 3 days  Continue with albuterol nebulizers 4 times a day  We will continue closely monitor the patient from a clinical standpoint  Patient high risk for readmission

## 2022-12-28 DIAGNOSIS — R05.9 COUGH: Primary | ICD-10-CM

## 2022-12-28 RX ORDER — GUAIFENESIN 600 MG/1
TABLET, EXTENDED RELEASE ORAL
Qty: 60 TABLET | Refills: 4 | Status: SHIPPED | OUTPATIENT
Start: 2022-12-28

## 2023-02-02 ENCOUNTER — NURSING HOME VISIT (OUTPATIENT)
Dept: OTHER | Facility: HOSPITAL | Age: 88
End: 2023-02-02

## 2023-02-02 DIAGNOSIS — J44.9 COPD, MODERATE (HCC): Primary | ICD-10-CM

## 2023-02-02 DIAGNOSIS — E87.1 HYPONATREMIA: ICD-10-CM

## 2023-02-02 DIAGNOSIS — R26.2 AMBULATORY DYSFUNCTION: ICD-10-CM

## 2023-02-02 DIAGNOSIS — I50.32 CHRONIC DIASTOLIC CHF (CONGESTIVE HEART FAILURE) (HCC): ICD-10-CM

## 2023-02-02 RX ORDER — GABAPENTIN 300 MG/1
CAPSULE ORAL
Qty: 30 CAPSULE | Refills: 4 | Status: SHIPPED | OUTPATIENT
Start: 2023-02-02

## 2023-02-06 DIAGNOSIS — R73.01 IMPAIRED FASTING GLUCOSE: Primary | ICD-10-CM

## 2023-02-06 PROBLEM — J10.1 INFLUENZA A: Status: RESOLVED | Noted: 2022-12-08 | Resolved: 2023-02-06

## 2023-03-16 ENCOUNTER — TELEPHONE (OUTPATIENT)
Dept: NEPHROLOGY | Facility: CLINIC | Age: 88
End: 2023-03-16

## 2023-03-16 NOTE — TELEPHONE ENCOUNTER
Nadia with [facility] called for pt  Pt is requesting a portable concentrator for her oxygen  Nadia also requested updated notes from last visit, which she stated was Feb 2nd  I was unable to find notes to send  Nadia stated she is not in a andrew

## 2023-03-23 VITALS
TEMPERATURE: 98.2 F | BODY MASS INDEX: 24.22 KG/M2 | WEIGHT: 124 LBS | RESPIRATION RATE: 14 BRPM | SYSTOLIC BLOOD PRESSURE: 139 MMHG | HEART RATE: 97 BPM | DIASTOLIC BLOOD PRESSURE: 66 MMHG

## 2023-03-23 DIAGNOSIS — E44.1 MILD PROTEIN-CALORIE MALNUTRITION (HCC): Primary | ICD-10-CM

## 2023-03-23 NOTE — PROGRESS NOTES
[facility] [place], [place]    Name: Amira Yost  YOB: 1935      Assessment/Plan:    COPD, moderate ([address] )  Continue with oxygen via nasal cannula as well as nebulizer treatments and other inhalers as prescribed by pulmonary  Patient currently stable with oxygen via nasal cannula at 4 L/minute  Chronic diastolic CHF (congestive heart failure) (HCC)  Wt Readings from Last 3 Encounters:   02/02/23 56 2 kg (124 lb)   12/08/22 59 2 kg (130 lb 8 oz)   08/06/21 64 4 kg (142 lb)     Patient is compensated at this time, continue with furosemide 40 mg daily, monitor volume status and clinical outcomes  Follow-up blood work every 3-4 months  Hyponatremia  Continued medical restriction, low-sodium diet  Problem List Items Addressed This Visit        Respiratory    COPD, moderate ([address] ) - Primary     Continue with oxygen via nasal cannula as well as nebulizer treatments and other inhalers as prescribed by pulmonary  Patient currently stable with oxygen via nasal cannula at 4 L/minute  Cardiovascular and Mediastinum    Chronic diastolic CHF (congestive heart failure) (HCC)     Wt Readings from Last 3 Encounters:   02/02/23 56 2 kg (124 lb)   12/08/22 59 2 kg (130 lb 8 oz)   08/06/21 64 4 kg (142 lb)     Patient is compensated at this time, continue with furosemide 40 mg daily, monitor volume status and clinical outcomes  Follow-up blood work every 3-4 months  Other    Hyponatremia     Continued medical restriction, low-sodium diet  Subjective:      Patient ID: Amira Yost is a 82 y o  female  Patient presents for follow up  Patient has been doing well since her last visit, she continues to require 4 L via nasal cannula to maintain proper oxygenation  Stay on medications as prescribed with no specific side effects at this time      We reviewed the patient's labs in detail, last blood work noted a serum creatinine of around 0 7 mg/dL and a serum sodium level of 133 mmol/L  The following portions of the patient's history were reviewed and updated as appropriate: allergies, current medications, past family history, past medical history, past social history, past surgical history and problem list     Review of Systems   All other systems reviewed and are negative          Social History     Socioeconomic History   • Marital status: /Civil Union     Spouse name: Not on file   • Number of children: Not on file   • Years of education: Not on file   • Highest education level: Not on file   Occupational History   • Not on file   Tobacco Use   • Smoking status: Former   • Smokeless tobacco: Never   Substance and Sexual Activity   • Alcohol use: No   • Drug use: No   • Sexual activity: Not on file   Other Topics Concern   • Not on file   Social History Narrative   • Not on file     Social Determinants of Health     Financial Resource Strain: Not on file   Food Insecurity: Not on file   Transportation Needs: Not on file   Physical Activity: Not on file   Stress: Not on file   Social Connections: Not on file   Intimate Partner Violence: Not on file   Housing Stability: Not on file     Past Medical History:   Diagnosis Date   • Cardiac disease    • CHF (congestive heart failure) ([facility] )    • Diabetes mellitus ([facility] )    • Emphysema lung ([facility] )    • History of fall    • Hyperlipidemia    • Hypertension    • Incontinence in female    • Pulmonary emphysema ([facility] )    • Wound infection after surgery      Past Surgical History:   Procedure Laterality Date   • APPENDECTOMY     •  SECTION     • INCISION AND DRAINAGE POSTERIOR SPINE N/A 2017    Procedure: THORACIC WOUND EXPLORATION, WASHOUT, DEBRIDEMENT and CLOSURE;  Surgeon: Abigail Chavez MD;  Location:  MAIN OR;  Service: Orthopedics   • LUMBAR FUSION N/A 2017    Procedure: T3-T8 Posterior Spinal Fusion;  Surgeon: Abigail Chavez MD;  Location: [facility];  Service:    • VA I&D, POST SPINE, CERV/THOR/CERV-THOR N/A 11/28/2017    Procedure: I&D AND DEBRIDEMENT  of deep  of thoracic spine WOUND;  Surgeon: Juanjose Norman MD;  Location: [facility];  Service: Orthopedics       Current Outpatient Medications:   •  albuterol (2 5 mg/3 mL) 0 083 % nebulizer solution, Take 2 5 mg by nebulization every 6 (six) hours as needed for wheezing, Disp: , Rfl:   •  aspirin 81 mg chewable tablet, Chew 81 mg, Disp: , Rfl:   •  atorvastatin (LIPITOR) 80 mg tablet, TAKE ONE TABLET BY MOUTH EVERY DAY, Disp: 90 tablet, Rfl: 3  •  b complex vitamins tablet, Take 1 tablet by mouth daily, Disp: , Rfl:   •  Blood Glucose Monitoring Suppl (OneTouch Verio Reflect) w/Device KIT, Check blood sugars four times daily  Please substitute with appropriate alternative as covered by patient's insurance   Dx: E11 65, Disp: 1 kit, Rfl: 0  •  carvedilol (COREG) 3 125 mg tablet, TAKE ONE TABLET BY MOUTH TWICE A DAY WITH MEALS, Disp: 120 tablet, Rfl: 5  •  cholecalciferol (VITAMIN D3) 1,000 units tablet, Take 1,000 Units by mouth daily, Disp: , Rfl:   •  clopidogrel (PLAVIX) 75 mg tablet, Take 1 tablet (75 mg total) by mouth daily, Disp: 30 tablet, Rfl: 28  •  Coenzyme Q10 (COQ10) 100 MG CAPS, Take 300 mg by mouth daily  , Disp: , Rfl:   •  ferrous sulfate 324 (65 Fe) mg, Take 1 tablet (324 mg total) by mouth 2 (two) times a day before meals, Disp: 60 tablet, Rfl: 2  •  fluticasone (FLONASE) 50 mcg/act nasal spray, 1 spray into each nostril daily, Disp: 16 g, Rfl: 0  •  fluticasone-umeclidinium-vilanterol (TRELEGY ELLIPTA) 100-62 5-25 MCG/INH inhaler, Inhale 1 puff daily Rinse mouth after use , Disp: 2 Inhaler, Rfl: 0  •  fluticasone-umeclidinium-vilanterol (Trelegy Ellipta) 100-62 5-25 MCG/INH inhaler, Trelegy Ellipta 100 mcg-62 5 mcg-25 mcg powder for inhalation, Disp: , Rfl:   •  furosemide (LASIX) 40 mg tablet, TAKE ONE TABLET BY MOUTH EVERY DAY, Disp: 90 tablet, Rfl: 1  •  gabapentin (NEURONTIN) 300 mg capsule, 1 CAP ORALLY AT BEDTIME DX: NEUROPATHY *NOT ON CYCLE-NO REFILL*, Disp: 30 capsule, Rfl: 4  •  glucose blood (OneTouch Verio) test strip, Check blood sugars four times daily  Please substitute with appropriate alternative as covered by patient's insurance  Dx: E11 65, Disp: 400 each, Rfl: 3  •  hydrOXYzine HCL (ATARAX) 25 mg tablet, Take 1 tablet (25 mg total) by mouth every 6 (six) hours as needed for itching, Disp: 30 tablet, Rfl: 0  •  levalbuterol (XOPENEX) 0 63 mg/3 mL nebulizer solution, Take 0 63 mg by nebulization every 4 (four) hours as needed for wheezing, Disp: , Rfl:   •  lisinopril (ZESTRIL) 2 5 mg tablet, TAKE ONE TABLET BY MOUTH EVERY DAY AT BEDTIME, Disp: 30 tablet, Rfl: 5  •  metFORMIN (GLUCOPHAGE) 500 mg tablet, 1 TAB ORALLY EVERY MORNING DX: DIABETES (NIDDM) *NOT ON CYCLE-NO REFILL*, Disp: 30 tablet, Rfl: 4  •  montelukast (SINGULAIR) 10 mg tablet, TAKE ONE TABLET BY MOUTH AT BEDTIME, Disp: 90 tablet, Rfl: 1  •  Mucus Relief 600 MG 12 hr tablet, 1 TAB ORALLY TWICE DAILY DX: COUGH *NOT ON CYCLE-NO REFILL*, Disp: 60 tablet, Rfl: 4  •  OneTouch Delica Lancets 42T MISC, Check blood sugars four times daily  Please substitute with appropriate alternative as covered by patient's insurance   Dx: E11 65, Disp: 400 each, Rfl: 3  •  Respiratory Therapy Supplies (NEBULIZER/TUBING/MOUTHPIECE) KIT, by Does not apply route every 4 (four) hours as needed (shortness of breath), Disp: 1 each, Rfl: 1  •  tiotropium (SPIRIVA) 18 mcg inhalation capsule, Place 18 mcg into inhaler and inhale as needed  , Disp: , Rfl:   •  Trelegy Ellipta 100-62 5-25 MCG/ACT inhaler, 1 PUFF ORALLY EVERY MORNING DX: COPD **DISCARD 6 WK AFTER OPENED**, Disp: 60 each, Rfl: 5  •  triamcinolone (KENALOG) 0 5 % cream, Apply topically 3 (three) times a day, Disp: 30 g, Rfl: 2  •  Ventolin  (90 Base) MCG/ACT inhaler, , Disp: , Rfl:      Lab Results   Component Value Date    SODIUM 129 (L) 08/26/2022    K 4 2 08/26/2022    CL 94 (L) 08/26/2022    CO2 31 08/26/2022    AGAP 4 08/26/2022    BUN 19 08/26/2022    CREATININE 0 68 08/26/2022    GLUC 90 11/29/2017    GLUF 98 08/26/2022    CALCIUM 9 5 08/26/2022    AST 20 04/08/2022    ALT 32 04/08/2022    ALKPHOS 77 04/08/2022    TP 7 1 04/08/2022    TBILI 0 49 04/08/2022    EGFR 80 08/26/2022     Lab Results   Component Value Date    WBC 5 97 04/08/2022    HGB 10 1 (L) 04/08/2022    HCT 32 3 (L) 04/08/2022     (H) 04/08/2022     04/08/2022     Lab Results   Component Value Date    CHOLESTEROL 162 04/08/2022    CHOLESTEROL 196 05/10/2021    CHOLESTEROL 157 03/12/2019     Lab Results   Component Value Date    HDL 98 04/08/2022     05/10/2021    HDL 94 (H) 03/12/2019     Lab Results   Component Value Date    LDLCALC 51 04/08/2022    LDLCALC 77 05/10/2021    LDLCALC 52 03/12/2019     Lab Results   Component Value Date    TRIG 63 04/08/2022    TRIG 62 05/10/2021    TRIG 56 03/12/2019     No results found for: CHOLHDL  No results found for: [id number], TSH        Objective:      /66   Pulse 97   Temp 98 2 °F (36 8 °C)   Resp 14   Wt 56 2 kg (124 lb)   BMI 24 22 kg/m²          Physical Exam  Vitals reviewed  Constitutional:       General: She is not in acute distress  Appearance: She is well-developed  HENT:      Head: Normocephalic and atraumatic  Eyes:      Conjunctiva/sclera: Conjunctivae normal    Cardiovascular:      Rate and Rhythm: Normal rate and regular rhythm  Pulmonary:      Effort: Pulmonary effort is normal       Comments: Reduced at the bases  Abdominal:      Palpations: Abdomen is soft  Musculoskeletal:      Cervical back: Neck supple  Skin:     General: Skin is warm  Findings: No rash  Neurological:      Mental Status: She is alert and oriented to person, place, and time  Cranial Nerves: No cranial nerve deficit     Psychiatric:         Behavior: Behavior normal

## 2023-03-23 NOTE — ASSESSMENT & PLAN NOTE
Wt Readings from Last 3 Encounters:   02/02/23 56 2 kg (124 lb)   12/08/22 59 2 kg (130 lb 8 oz)   08/06/21 64 4 kg (142 lb)     Patient is compensated at this time, continue with furosemide 40 mg daily, monitor volume status and clinical outcomes  Follow-up blood work every 3-4 months

## 2023-03-23 NOTE — ASSESSMENT & PLAN NOTE
Continue with oxygen via nasal cannula as well as nebulizer treatments and other inhalers as prescribed by pulmonary  Patient currently stable with oxygen via nasal cannula at 4 L/minute

## 2023-03-24 RX ORDER — MULTIVITAMIN WITH FOLIC ACID 400 MCG
TABLET ORAL
Qty: 28 TABLET | Refills: 4 | Status: SHIPPED | OUTPATIENT
Start: 2023-03-24

## 2023-05-18 DIAGNOSIS — Z91.09 ENVIRONMENTAL ALLERGIES: ICD-10-CM

## 2023-05-22 RX ORDER — MONTELUKAST SODIUM 10 MG/1
TABLET ORAL
Qty: 30 TABLET | Refills: 4 | Status: SHIPPED | OUTPATIENT
Start: 2023-05-22

## 2023-05-25 DIAGNOSIS — J44.9 COPD, MODERATE (HCC): Primary | ICD-10-CM

## 2023-05-25 RX ORDER — ALBUTEROL SULFATE 2.5 MG/3ML
SOLUTION RESPIRATORY (INHALATION)
Qty: 180 ML | Refills: 4 | Status: SHIPPED | OUTPATIENT
Start: 2023-05-25

## 2023-07-07 DIAGNOSIS — R60.0 BILATERAL LOWER EXTREMITY EDEMA: Primary | ICD-10-CM

## 2023-07-07 RX ORDER — TORSEMIDE 20 MG/1
TABLET ORAL
Qty: 60 TABLET | Refills: 4 | Status: SHIPPED | OUTPATIENT
Start: 2023-07-07

## 2023-08-09 DIAGNOSIS — E78.2 MIXED HYPERLIPIDEMIA: ICD-10-CM

## 2023-08-09 RX ORDER — ATORVASTATIN CALCIUM 80 MG/1
TABLET, FILM COATED ORAL
Qty: 90 TABLET | Refills: 3 | OUTPATIENT
Start: 2023-08-09

## 2023-08-22 DIAGNOSIS — E78.2 MIXED HYPERLIPIDEMIA: ICD-10-CM

## 2023-08-22 RX ORDER — ATORVASTATIN CALCIUM 80 MG/1
80 TABLET, FILM COATED ORAL DAILY
Qty: 90 TABLET | Refills: 3 | Status: SHIPPED | OUTPATIENT
Start: 2023-08-22

## 2023-09-24 DIAGNOSIS — I10 ESSENTIAL HYPERTENSION: ICD-10-CM

## 2023-09-25 RX ORDER — CARVEDILOL 3.12 MG/1
TABLET ORAL
Qty: 120 TABLET | Refills: 5 | Status: SHIPPED | OUTPATIENT
Start: 2023-09-25

## 2023-10-03 ENCOUNTER — RA CDI HCC (OUTPATIENT)
Dept: OTHER | Facility: HOSPITAL | Age: 88
End: 2023-10-03

## 2023-10-10 ENCOUNTER — OFFICE VISIT (OUTPATIENT)
Dept: FAMILY MEDICINE CLINIC | Facility: CLINIC | Age: 88
End: 2023-10-10
Payer: MEDICARE

## 2023-10-10 VITALS
HEART RATE: 71 BPM | DIASTOLIC BLOOD PRESSURE: 70 MMHG | OXYGEN SATURATION: 98 % | SYSTOLIC BLOOD PRESSURE: 122 MMHG | BODY MASS INDEX: 26.76 KG/M2 | TEMPERATURE: 97 F | WEIGHT: 137 LBS

## 2023-10-10 DIAGNOSIS — I50.32 CHRONIC DIASTOLIC CHF (CONGESTIVE HEART FAILURE) (HCC): ICD-10-CM

## 2023-10-10 DIAGNOSIS — J44.1 COPD EXACERBATION (HCC): ICD-10-CM

## 2023-10-10 DIAGNOSIS — R60.0 BILATERAL LOWER EXTREMITY EDEMA: ICD-10-CM

## 2023-10-10 DIAGNOSIS — J44.9 COPD, MODERATE (HCC): ICD-10-CM

## 2023-10-10 DIAGNOSIS — E78.2 MIXED HYPERLIPIDEMIA: ICD-10-CM

## 2023-10-10 DIAGNOSIS — D64.9 ANEMIA, UNSPECIFIED TYPE: ICD-10-CM

## 2023-10-10 DIAGNOSIS — R53.83 OTHER FATIGUE: ICD-10-CM

## 2023-10-10 DIAGNOSIS — Z00.00 MEDICARE ANNUAL WELLNESS VISIT, SUBSEQUENT: ICD-10-CM

## 2023-10-10 DIAGNOSIS — R73.01 IMPAIRED FASTING GLUCOSE: Primary | ICD-10-CM

## 2023-10-10 PROBLEM — I95.9 HYPOTENSION: Status: RESOLVED | Noted: 2019-12-04 | Resolved: 2023-10-10
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
## 2023-10-10 PROBLEM — R06.09 DYSPNEA ON EXERTION: Status: RESOLVED | Noted: 2018-08-17 | Resolved: 2023-10-10

## 2023-10-10 PROBLEM — E87.1 HYPONATREMIA: Status: RESOLVED | Noted: 2018-12-04 | Resolved: 2023-10-10

## 2023-10-10 PROBLEM — R79.89 ABNORMAL TSH: Status: RESOLVED | Noted: 2018-05-18 | Resolved: 2023-10-10

## 2023-10-10 PROBLEM — Z23 NEED FOR INFLUENZA VACCINATION: Status: RESOLVED | Noted: 2019-12-04 | Resolved: 2023-10-10

## 2023-10-10 PROBLEM — Z13.220 NEED FOR LIPID SCREENING: Status: RESOLVED | Noted: 2019-03-05 | Resolved: 2023-10-10

## 2023-10-10 PROBLEM — M54.9 PAIN IN BACK: Status: RESOLVED | Noted: 2017-04-13 | Resolved: 2023-10-10

## 2023-10-10 PROBLEM — Z23 NEED FOR PNEUMOCOCCAL VACCINATION: Status: RESOLVED | Noted: 2018-12-04 | Resolved: 2023-10-10

## 2023-10-10 PROCEDURE — 99213 OFFICE O/P EST LOW 20 MIN: CPT | Performed by: FAMILY MEDICINE

## 2023-10-10 PROCEDURE — G0439 PPPS, SUBSEQ VISIT: HCPCS | Performed by: FAMILY MEDICINE

## 2023-10-10 RX ORDER — TORSEMIDE 20 MG/1
TABLET ORAL
Qty: 60 TABLET | Refills: 4
Start: 2023-10-10

## 2023-10-10 NOTE — PATIENT INSTRUCTIONS
Medicare Preventive Visit Patient Instructions  Thank you for completing your Welcome to Medicare Visit or Medicare Annual Wellness Visit today. Your next wellness visit will be due in one year (10/10/2024). The screening/preventive services that you may require over the next 5-10 years are detailed below. Some tests may not apply to you based off risk factors and/or age. Screening tests ordered at today's visit but not completed yet may show as past due. Also, please note that scanned in results may not display below. Preventive Screenings:  Service Recommendations Previous Testing/Comments   Colorectal Cancer Screening  * Colonoscopy    * Fecal Occult Blood Test (FOBT)/Fecal Immunochemical Test (FIT)  * Fecal DNA/Cologuard Test  * Flexible Sigmoidoscopy Age: 43-73 years old   Colonoscopy: every 10 years (may be performed more frequently if at higher risk)  OR  FOBT/FIT: every 1 year  OR  Cologuard: every 3 years  OR  Sigmoidoscopy: every 5 years  Screening may be recommended earlier than age 39 if at higher risk for colorectal cancer. Also, an individualized decision between you and your healthcare provider will decide whether screening between the ages of 77-80 would be appropriate. Colonoscopy: Not on file  FOBT/FIT: Not on file  Cologuard: Not on file  Sigmoidoscopy: Not on file    Screening Not Indicated     Breast Cancer Screening Age: 36 years old  Frequency: every 1-2 years  Not required if history of left and right mastectomy Mammogram: 03/29/2017        Cervical Cancer Screening Between the ages of 21-29, pap smear recommended once every 3 years. Between the ages of 32-69, can perform pap smear with HPV co-testing every 5 years.    Recommendations may differ for women with a history of total hysterectomy, cervical cancer, or abnormal pap smears in past. Pap Smear: Not on file    Screening Not Indicated   Hepatitis C Screening Once for adults born between 1945 and 1965  More frequently in patients at high risk for Hepatitis C Hep C Antibody: Not on file        Diabetes Screening 1-2 times per year if you're at risk for diabetes or have pre-diabetes Fasting glucose: 98 mg/dL (8/26/2022)  A1C: 6.3 (11/23/2022)  Screening Current   Cholesterol Screening Once every 5 years if you don't have a lipid disorder. May order more often based on risk factors. Lipid panel: 12/03/2022    Screening Not Indicated  History Lipid Disorder     Other Preventive Screenings Covered by Medicare:  1. Abdominal Aortic Aneurysm (AAA) Screening: covered once if your at risk. You're considered to be at risk if you have a family history of AAA. 2. Lung Cancer Screening: covers low dose CT scan once per year if you meet all of the following conditions: (1) Age 48-67; (2) No signs or symptoms of lung cancer; (3) Current smoker or have quit smoking within the last 15 years; (4) You have a tobacco smoking history of at least 20 pack years (packs per day multiplied by number of years you smoked); (5) You get a written order from a healthcare provider. 3. Glaucoma Screening: covered annually if you're considered high risk: (1) You have diabetes OR (2) Family history of glaucoma OR (3)  aged 48 and older OR (3)  American aged 72 and older  3. Osteoporosis Screening: covered every 2 years if you meet one of the following conditions: (1) You're estrogen deficient and at risk for osteoporosis based off medical history and other findings; (2) Have a vertebral abnormality; (3) On glucocorticoid therapy for more than 3 months; (4) Have primary hyperparathyroidism; (5) On osteoporosis medications and need to assess response to drug therapy. · Last bone density test (DXA Scan): 05/29/2018. 5. HIV Screening: covered annually if you're between the age of 14-79. Also covered annually if you are younger than 13 and older than 72 with risk factors for HIV infection.  For pregnant patients, it is covered up to 3 times per pregnancy. Immunizations:  Immunization Recommendations   Influenza Vaccine Annual influenza vaccination during flu season is recommended for all persons aged >= 6 months who do not have contraindications   Pneumococcal Vaccine   * Pneumococcal conjugate vaccine = PCV13 (Prevnar 13), PCV15 (Vaxneuvance), PCV20 (Prevnar 20)  * Pneumococcal polysaccharide vaccine = PPSV23 (Pneumovax) Adults 57-24 yo with certain risk factors or if 65+ yo  · If never received any pneumonia vaccine: recommend Prevnar 20 (PCV20)  · Give PCV20 if previously received 1 dose of PCV13 or PPSV23   Hepatitis B Vaccine 3 dose series if at intermediate or high risk (ex: diabetes, end stage renal disease, liver disease)   Respiratory syncytial virus (RSV) Vaccine - COVERED BY MEDICARE PART D  * RSVPreF3 (Arexvy) CDC recommends that adults 61years of age and older may receive a single dose of RSV vaccine using shared clinical decision-making (SCDM)   Tetanus (Td) Vaccine - COST NOT COVERED BY MEDICARE PART B Following completion of primary series, a booster dose should be given every 10 years to maintain immunity against tetanus. Td may also be given as tetanus wound prophylaxis. Tdap Vaccine - COST NOT COVERED BY MEDICARE PART B Recommended at least once for all adults. For pregnant patients, recommended with each pregnancy. Shingles Vaccine (Shingrix) - COST NOT COVERED BY MEDICARE PART B  2 shot series recommended in those 19 years and older who have or will have weakened immune systems or those 50 years and older     Health Maintenance Due:  There are no preventive care reminders to display for this patient. Immunizations Due:      Topic Date Due   • COVID-19 Vaccine (4 - Moderna series) 04/27/2022     Advance Directives   What are advance directives? Advance directives are legal documents that state your wishes and plans for medical care.  These plans are made ahead of time in case you lose your ability to make decisions for yourself. Advance directives can apply to any medical decision, such as the treatments you want, and if you want to donate organs. What are the types of advance directives? There are many types of advance directives, and each state has rules about how to use them. You may choose a combination of any of the following:  · Living will: This is a written record of the treatment you want. You can also choose which treatments you do not want, which to limit, and which to stop at a certain time. This includes surgery, medicine, IV fluid, and tube feedings. · Durable power of  for healthcare [facility]): This is a written record that states who you want to make healthcare choices for you when you are unable to make them for yourself. This person, called a proxy, is usually a family member or a friend. You may choose more than 1 proxy. · Do not resuscitate (DNR) order:  A DNR order is used in case your heart stops beating or you stop breathing. It is a request not to have certain forms of treatment, such as CPR. A DNR order may be included in other types of advance directives. · Medical directive: This covers the care that you want if you are in a coma, near death, or unable to make decisions for yourself. You can list the treatments you want for each condition. Treatment may include pain medicine, surgery, blood transfusions, dialysis, IV or tube feedings, and a ventilator (breathing machine). · Values history: This document has questions about your views, beliefs, and how you feel and think about life. This information can help others choose the care that you would choose. Why are advance directives important? An advance directive helps you control your care. Although spoken wishes may be used, it is better to have your wishes written down. Spoken wishes can be misunderstood, or not followed. Treatments may be given even if you do not want them.  An advance directive may make it easier for your family to make difficult choices about your care. © Copyright [employer] 2018 Information is for End User's use only and may not be sold, redistributed or otherwise used for commercial purposes.  All illustrations and images included in CareNotes® are the copyrighted property of [employer] or [address] [place] [address]

## 2023-10-10 NOTE — PROGRESS NOTES
Assessment and Plan:     Problem List Items Addressed This Visit    None       Preventive health issues were discussed with patient, and age appropriate screening tests were ordered as noted in patient's After Visit Summary. Personalized health advice and appropriate referrals for health education or preventive services given if needed, as noted in patient's After Visit Summary.      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   • Pain in back   • NSTEMI (non-ST elevated myocardial infarction) ([address])   • Impaired fasting glucose   • Abnormal TSH   • Chronic systolic dysfunction of left ventricle   • Pulmonary emphysema (HCC)   • Anemia   • Screening for osteoporosis   • Dyspnea on exertion   • Medicare annual wellness visit, subsequent   • Need for pneumococcal vaccination   • Menopause ovarian failure   • Hyponatremia   • Chronic diastolic CHF (congestive heart failure) ([address])   • Need for lipid screening   • Need for influenza vaccination   • Ambulatory dysfunction   • Hypotension   • COPD, moderate ([address])   • Essential hypertension   • COPD exacerbation ([address])      Past Medical and Surgical History:     Past Medical History:   Diagnosis Date   • Cardiac disease    • CHF (congestive heart failure) ([address])    • Diabetes mellitus ([address])    • Emphysema lung ([address])    • History of fall    • Hyperlipidemia    • Hypertension    • Incontinence in female    • Pulmonary emphysema ([address])    • Wound infection after surgery      Past Surgical History:   Procedure Laterality Date   • APPENDECTOMY     •  SECTION     • INCISION AND DRAINAGE POSTERIOR SPINE N/A 6/20/2017    Procedure: THORACIC WOUND EXPLORATION, WASHOUT, DEBRIDEMENT and CLOSURE;  Surgeon: Lyndsay Claudio MD;  [facility];  Service: Orthopedics   • LUMBAR FUSION N/A 4/14/2017    Procedure: T3-T8 Posterior Spinal Fusion;  Surgeon: Lyndsay Claudio MD;  [facility];  Service:    • MI I&D DEEP ABSCESS PST SPINE CRV THRC/CERVICOTHR N/A 11/28/2017    Procedure: I&D AND DEBRIDEMENT  of deep  of thoracic spine WOUND;  Surgeon: Lyndsay Claudio MD;  [facility];  Service: Orthopedics      Family History:     Family History   Problem Relation Age of Onset   • No Known Problems Mother       Social History:     Social History     Socioeconomic History   • Marital status: /Civil Union     Spouse name: None   • Number of children: None   • Years of education: None   • Highest education level: None   Occupational History   • None   Tobacco Use   • Smoking status: Former   • Smokeless tobacco: Never   Substance and Sexual Activity   • Alcohol use: No   • Drug use: No   • Sexual activity: None   Other Topics Concern   • None   Social History Narrative   • None     Social Determinants of Health     Financial Resource Strain: Not on file   Food Insecurity: Not on file   Transportation Needs: Not on file   Physical Activity: Not on file   Stress: Not on file   Social Connections: Not on file   Intimate Partner Violence: Not on file   Housing Stability: Not on file      Medications and Allergies:     Current Outpatient Medications   Medication Sig Dispense Refill   • albuterol (2.5 mg/3 mL) 0.083 % nebulizer solution 1 VIAL VIA NEB EVERY 4 HOURS WHILE AWAKE DX: COPD 180 mL 4   • aspirin 81 mg chewable tablet Chew 81 mg     • atorvastatin (LIPITOR) 80 mg tablet Take 1 tablet (80 mg total) by mouth daily 90 tablet 3   • b complex vitamins tablet Take 1 tablet by mouth daily     • Blood Glucose Monitoring Suppl (OneTouch Verio Reflect) w/Device KIT Check blood sugars four times daily.  Please substitute with appropriate alternative as covered by patient's insurance. Dx: E11.65 1 kit 0   • carvedilol (COREG) 3.125 mg tablet TAKE ONE TABLET BY MOUTH TWICE A DAY WITH MEALS 120 tablet 5   • cholecalciferol (VITAMIN D3) 1,000 units tablet Take 1,000 Units by mouth daily     • clopidogrel (PLAVIX) 75 mg tablet Take 1 tablet (75 mg total) by mouth daily 30 tablet 28   • Coenzyme Q10 (COQ10) 100 MG CAPS Take 300 mg by mouth daily       • ferrous sulfate 324 (65 Fe) mg Take 1 tablet (324 mg total) by mouth 2 (two) times a day before meals 60 tablet 2   • fluticasone (FLONASE) 50 mcg/act nasal spray 1 spray into each nostril daily 16 g 0   • fluticasone-umeclidinium-vilanterol (TRELEGY ELLIPTA) 100-62.5-25 MCG/INH inhaler Inhale 1 puff daily Rinse mouth after use. 2 Inhaler 0   • fluticasone-umeclidinium-vilanterol (Trelegy Ellipta) 100-62.5-25 MCG/INH inhaler Trelegy Ellipta 100 mcg-62.5 mcg-25 mcg powder for inhalation     • furosemide (LASIX) 40 mg tablet TAKE ONE TABLET BY MOUTH EVERY DAY 90 tablet 1   • gabapentin (NEURONTIN) 300 mg capsule 1 CAP ORALLY AT BEDTIME DX: NEUROPATHY *NOT ON CYCLE-NO REFILL* 30 capsule 4   • glucose blood (OneTouch Verio) test strip Check blood sugars four times daily. Please substitute with appropriate alternative as covered by patient's insurance.  Dx: E11.65 400 each 3   • hydrOXYzine HCL (ATARAX) 25 mg tablet Take 1 tablet (25 mg total) by mouth every 6 (six) hours as needed for itching 30 tablet 0   • levalbuterol (XOPENEX) 0.63 mg/3 mL nebulizer solution Take 0.63 mg by nebulization every 4 (four) hours as needed for wheezing     • lisinopril (ZESTRIL) 2.5 mg tablet TAKE ONE TABLET BY MOUTH EVERY DAY AT BEDTIME 30 tablet 5   • metFORMIN (GLUCOPHAGE) 500 mg tablet 1 TAB ORALLY EVERY MORNING DX: DIABETES (NIDDM) *NOT ON CYCLE-NO REFILL* 30 tablet 4   • montelukast (SINGULAIR) 10 mg tablet 1 TAB ORALLY AT BEDTIME DX:ALLERGY *NOT ON CYCLE-NO REFILL* 30 tablet 4   • Mucus Relief 600 MG 12 hr tablet 1 TAB ORALLY TWICE DAILY DX: COUGH *NOT ON CYCLE-NO REFILL* 60 tablet 4   • Multiple Vitamin (Daily-Ibrahima) TABS 1 TAB ORALLY EVERY MORNING DX: SUPPLEMENT 28 tablet 4   • OneTouch Delica Lancets 46E MISC Check blood sugars four times daily. Please substitute with appropriate alternative as covered by patient's insurance. Dx: E11.65 400 each 3   • Respiratory Therapy Supplies (NEBULIZER/TUBING/MOUTHPIECE) KIT by Does not apply route every 4 (four) hours as needed (shortness of breath) 1 each 1   • tiotropium (SPIRIVA) 18 mcg inhalation capsule Place 18 mcg into inhaler and inhale as needed       • torsemide (DEMADEX) 20 mg tablet 2 TABS (40MG) ORALLY EVERY MORNING DX: EDEMA 60 tablet 4   • Trelegy Ellipta 100-62.5-25 MCG/ACT inhaler 1 PUFF ORALLY EVERY MORNING DX: COPD **DISCARD 6 WK AFTER OPENED** 60 each 5   • triamcinolone (KENALOG) 0.5 % cream Apply topically 3 (three) times a day 30 g 2   • Ventolin  (90 Base) MCG/ACT inhaler        No current facility-administered medications for this visit. Allergies   Allergen Reactions   • No Known Allergies Other (See Comments)      Immunizations:     Immunization History   Administered Date(s) Administered   • COVID-19 MODERNA VACC 0.5 ML IM 02/05/2021, 03/05/2021, 03/02/2022   • INFLUENZA 11/29/2017   • Influenza Quadrivalent Preservative Free 3 years and older IM 11/29/2017   • Influenza Split High Dose Preservative Free IM 01/13/2017   • Influenza, high dose seasonal 0.7 mL 10/17/2018, 12/04/2019   • Pneumococcal Conjugate 13-Valent 12/04/2019   • Pneumococcal Polysaccharide PPV23 12/04/2018   • Tdap 04/13/2017   • Tuberculin Skin Test 03/02/2020, 03/11/2020      Health Maintenance: There are no preventive care reminders to display for this patient.       Topic Date Due   • COVID-19 Vaccine (4 - Moderna series) 04/27/2022   • Influenza Vaccine (1) 09/01/2023      Medicare Screening Tests and Risk Assessments:     Dalila Haider is here for her Subsequent Wellness visit. Last Medicare Wellness visit information reviewed, patient interviewed, no change since last AWV. Health Risk Assessment:   Patient rates overall health as good. Patient feels that their physical health rating is same. Patient is very satisfied with their life. Eyesight was rated as same. Hearing was rated as slightly worse. Patient feels that their emotional and mental health rating is same. Patients states they are never, rarely angry. Patient states they are never, rarely unusually tired/fatigued. Pain experienced in the last 7 days has been none. Patient states that she has experienced no weight loss or gain in last 6 months. Depression Screening:   PHQ-2 Score: 0      Fall Risk Screening: In the past year, patient has experienced: no history of falling in past year      Urinary Incontinence Screening:   Patient has not leaked urine accidently in the last six months. Home Safety:  Patient has trouble with stairs inside or outside of their home. Patient has working smoke alarms and has working carbon monoxide detector. Home safety hazards include: none. Nutrition:   Current diet is Regular. Medications:   Patient is currently taking over-the-counter supplements. OTC medications include: see medication list. Patient is able to manage medications. Activities of Daily Living (ADLs)/Instrumental Activities of Daily Living (IADLs):   Walk and transfer into and out of bed and chair?: Yes  Dress and groom yourself?: Yes    Bathe or shower yourself?: Yes    Feed yourself?  Yes  Do your laundry/housekeeping?: No  Manage your money, pay your bills and track your expenses?: No  Make your own meals?: No    Do your own shopping?: No    Previous Hospitalizations:   Any hospitalizations or ED visits within the last 12 months?: No      PREVENTIVE SCREENINGS      Cardiovascular Screening:    General: History Lipid Disorder    Due for: Lipid Panel      Diabetes Screening: General: Screening Current      Colorectal Cancer Screening:     General: Screening Not Indicated      Breast Cancer Screening:     General: Screening Not Indicated      Cervical Cancer Screening:    General: Screening Not Indicated      Osteoporosis Screening:      Due for: DXA Axial      Abdominal Aortic Aneurysm (AAA) Screening:        General: Screening Not Indicated      Lung Cancer Screening:     General: Screening Not Indicated    Screening, Brief Intervention, and Referral to Treatment (SBIRT)    Screening  Typical number of drinks in a day: 0  Typical number of drinks in a week: 0  Interpretation: Low risk drinking behavior. Single Item Drug Screening:  How often have you used an illegal drug (including marijuana) or a prescription medication for non-medical reasons in the past year? never    Single Item Drug Screen Score: 0  Interpretation: Negative screen for possible drug use disorder    No results found. Physical Exam:     /70 (BP Location: Left arm, Patient Position: Sitting, Cuff Size: Standard)   Pulse 71   Temp (!) 97 °F (36.1 °C)   Wt 62.1 kg (137 lb)   SpO2 98% Comment: 3 LPM  BMI 26.76 kg/m²     Physical Exam  Vitals and nursing note reviewed. Constitutional:       General: She is not in acute distress. Appearance: She is well-developed. HENT:      Head: Normocephalic and atraumatic. Eyes:      Conjunctiva/sclera: Conjunctivae normal.   Cardiovascular:      Rate and Rhythm: Normal rate and regular rhythm. Heart sounds: No murmur heard. Pulmonary:      Effort: Pulmonary effort is normal. No respiratory distress. Breath sounds: Normal breath sounds. Abdominal:      Palpations: Abdomen is soft. Tenderness: There is no abdominal tenderness. Musculoskeletal:         General: No swelling. Cervical back: Neck supple. Skin:     General: Skin is warm and dry. Capillary Refill: Capillary refill takes less than 2 seconds.    Neurological: Mental Status: She is alert.    Psychiatric:         Mood and Affect: Mood normal.          Gaby Mauricio MD

## 2023-10-10 NOTE — PROGRESS NOTES
Name: Serafin Tanner      : 1935      MRN: [id number]  Encounter Provider: Sherrie Park MD  Encounter Date: 10/10/2023   Encounter department: [address] [place] [address]     1. Impaired fasting glucose  HgbA1c- 5.5  Will wait for cbc and fasting glucose  If evidence supports pre-diabetes recommend to stop metformin  -     Comprehensive metabolic panel; Future    2. Bilateral lower extremity edema  -     torsemide (DEMADEX) 20 mg tablet; 2 tabs every morning    3. COPD, moderate ([address])  Denies any SOB on home Oxygen 3 L NC    4. Chronic diastolic CHF (congestive heart failure) (HCC)  Denies any acute decompensation    5. Mixed hyperlipidemia  -     Lipid panel; Future    6. Other fatigue  -     CBC and differential; Future  -     TSH, 3rd generation with Free T4 reflex; Future    7. Anemia, unspecified type  Cbc    8. Medicare annual wellness visit, subsequent  See medicare wellness note    F/u in 6 months       Subjective     Patient is here to follow up. Has a Hx of Pre-diabetes denies any symptoms related to this. Does take metformin daily. Also has COPD and CHF denies any shortness of breath takes diuretic daily as well as inhaler. Review of Systems   Constitutional: Negative for activity change, appetite change, fatigue and fever. HENT: Negative for congestion and ear discharge. Respiratory: Negative for cough and shortness of breath. Cardiovascular: Negative for chest pain and palpitations. Gastrointestinal: Negative for diarrhea and nausea. Musculoskeletal: Negative for arthralgias and back pain. Skin: Negative for color change and rash. Neurological: Negative for dizziness and headaches. Psychiatric/Behavioral: Negative for agitation and behavioral problems.        Past Medical History:   Diagnosis Date   • Cardiac disease    • CHF (congestive heart failure) ([address])    • Diabetes mellitus ([address])    • Emphysema lung ([address])    • History of fall    • Hyperlipidemia    • Hypertension    • Incontinence in female    • Pulmonary emphysema ([address])    • Wound infection after surgery      Past Surgical History:   Procedure Laterality Date   • APPENDECTOMY     •  SECTION     • INCISION AND DRAINAGE POSTERIOR SPINE N/A 2017    Procedure: THORACIC WOUND EXPLORATION, WASHOUT, DEBRIDEMENT and CLOSURE;  Surgeon: Clint Rascon MD;  [facility];  Service: Orthopedics   • LUMBAR FUSION N/A 2017    Procedure: T3-T8 Posterior Spinal Fusion;  Surgeon: Clint Rascon MD;  [facility];  Service:    • OR I&D DEEP ABSCESS PST SPINE CRV THRC/CERVICOTHR N/A 2017    Procedure: I&D AND DEBRIDEMENT  of deep  of thoracic spine WOUND;  Surgeon: Clint Rascon MD;  [facility];  Service: Orthopedics     Family History   Problem Relation Age of Onset   • No Known Problems Mother      Social History     Socioeconomic History   • Marital status: /Civil Union     Spouse name: None   • Number of children: None   • Years of education: None   • Highest education level: None   Occupational History   • None   Tobacco Use   • Smoking status: Former   • Smokeless tobacco: Never   Substance and Sexual Activity   • Alcohol use: No   • Drug use: No   • Sexual activity: None   Other Topics Concern   • None   Social History Narrative   • None     Social Determinants of Health     Financial Resource Strain: Low Risk  (10/10/2023)    Overall Financial Resource Strain (CARDIA)    • Difficulty of Paying Living Expenses: Not very hard   Food Insecurity: Not on file   Transportation Needs: No Transportation Needs (10/10/2023)    PRAPARE - Transportation    • Lack of Transportation (Medical): No    • Lack of Transportation (Non-Medical):  No   Physical Activity: Not on file   Stress: Not on file   Social Connections: Not on file   Intimate Partner Violence: Not on file   Housing Stability: Not on file     Current Outpatient Medications on File Prior to Visit   Medication Sig   • albuterol (2.5 mg/3 mL) 0.083 % nebulizer solution 1 VIAL VIA NEB EVERY 4 HOURS WHILE AWAKE DX: COPD   • atorvastatin (LIPITOR) 80 mg tablet Take 1 tablet (80 mg total) by mouth daily   • b complex vitamins tablet Take 1 tablet by mouth daily   • carvedilol (COREG) 3.125 mg tablet TAKE ONE TABLET BY MOUTH TWICE A DAY WITH MEALS   • cholecalciferol (VITAMIN D3) 1,000 units tablet Take 1,000 Units by mouth daily   • clopidogrel (PLAVIX) 75 mg tablet Take 1 tablet (75 mg total) by mouth daily   • Coenzyme Q10 (COQ10) 100 MG CAPS Take 300 mg by mouth daily     • fluticasone-umeclidinium-vilanterol (TRELEGY ELLIPTA) 100-62.5-25 MCG/INH inhaler Inhale 1 puff daily Rinse mouth after use. • gabapentin (NEURONTIN) 300 mg capsule 1 CAP ORALLY AT BEDTIME DX: NEUROPATHY *NOT ON CYCLE-NO REFILL*   • metFORMIN (GLUCOPHAGE) 500 mg tablet 1 TAB ORALLY EVERY MORNING DX: DIABETES (NIDDM) *NOT ON CYCLE-NO REFILL*   • montelukast (SINGULAIR) 10 mg tablet 1 TAB ORALLY AT BEDTIME DX:ALLERGY *NOT ON CYCLE-NO REFILL*   • Mucus Relief 600 MG 12 hr tablet 1 TAB ORALLY TWICE DAILY DX: COUGH *NOT ON CYCLE-NO REFILL*   • Multiple Vitamin (Daily-Ibrahima) TABS 1 TAB ORALLY EVERY MORNING DX: SUPPLEMENT   • Ventolin  (90 Base) MCG/ACT inhaler    • [DISCONTINUED] aspirin 81 mg chewable tablet Chew 81 mg (Patient not taking: Reported on 10/10/2023)   • [DISCONTINUED] Blood Glucose Monitoring Suppl (OneTouch Verio Reflect) w/Device KIT Check blood sugars four times daily. Please substitute with appropriate alternative as covered by patient's insurance.  Dx: E11.65 (Patient not taking: Reported on 10/10/2023)   • [DISCONTINUED] ferrous sulfate 324 (65 Fe) mg Take 1 tablet (324 mg total) by mouth 2 (two) times a day before meals   • [DISCONTINUED] fluticasone (FLONASE) 50 mcg/act nasal spray 1 spray into each nostril daily (Patient not taking: Reported on 10/10/2023)   • [DISCONTINUED] fluticasone-umeclidinium-vilanterol (Trelegy Ellipta) 100-62.5-25 MCG/INH inhaler Trelegy Ellipta 100 mcg-62.5 mcg-25 mcg powder for inhalation (Patient not taking: Reported on 10/10/2023)   • [DISCONTINUED] furosemide (LASIX) 40 mg tablet TAKE ONE TABLET BY MOUTH EVERY DAY (Patient not taking: Reported on 10/10/2023)   • [DISCONTINUED] glucose blood (OneTouch Verio) test strip Check blood sugars four times daily. Please substitute with appropriate alternative as covered by patient's insurance. Dx: E11.65 (Patient not taking: Reported on 10/10/2023)   • [DISCONTINUED] hydrOXYzine HCL (ATARAX) 25 mg tablet Take 1 tablet (25 mg total) by mouth every 6 (six) hours as needed for itching (Patient not taking: Reported on 10/10/2023)   • [DISCONTINUED] levalbuterol (XOPENEX) 0.63 mg/3 mL nebulizer solution Take 0.63 mg by nebulization every 4 (four) hours as needed for wheezing (Patient not taking: Reported on 10/10/2023)   • [DISCONTINUED] lisinopril (ZESTRIL) 2.5 mg tablet TAKE ONE TABLET BY MOUTH EVERY DAY AT BEDTIME (Patient not taking: Reported on 10/10/2023)   • [DISCONTINUED] OneTouch Delica Lancets 91J MISC Check blood sugars four times daily. Please substitute with appropriate alternative as covered by patient's insurance.  Dx: E11.65 (Patient not taking: Reported on 10/10/2023)   • [DISCONTINUED] Respiratory Therapy Supplies (NEBULIZER/TUBING/MOUTHPIECE) KIT by Does not apply route every 4 (four) hours as needed (shortness of breath) (Patient not taking: Reported on 10/10/2023)   • [DISCONTINUED] tiotropium (SPIRIVA) 18 mcg inhalation capsule Place 18 mcg into inhaler and inhale as needed   (Patient not taking: Reported on 10/10/2023)   • [DISCONTINUED] torsemide (DEMADEX) 20 mg tablet 2 TABS (40MG) ORALLY EVERY MORNING DX: EDEMA (Patient not taking: Reported on 10/10/2023)   • [DISCONTINUED] Trelegy Ellipta 100-62.5-25 MCG/ACT inhaler 1 PUFF ORALLY EVERY MORNING DX: COPD **DISCARD 6 WK AFTER OPENED** (Patient not taking: Reported on 10/10/2023)   • [DISCONTINUED] triamcinolone (KENALOG) 0.5 % cream Apply topically 3 (three) times a day (Patient not taking: Reported on 10/10/2023)     Allergies   Allergen Reactions   • No Known Allergies Other (See Comments)     Immunization History   Administered Date(s) Administered   • COVID-19 MODERNA VACC 0.5 ML IM 02/05/2021, 03/05/2021, 03/02/2022   • INFLUENZA 11/29/2017   • Influenza Quadrivalent Preservative Free 3 years and older IM 11/29/2017   • Influenza Split High Dose Preservative Free IM 01/13/2017, 09/25/2023   • Influenza, high dose seasonal 0.7 mL 10/17/2018, 12/04/2019   • Pneumococcal Conjugate 13-Valent 12/04/2019   • Pneumococcal Polysaccharide PPV23 12/04/2018   • Tdap 04/13/2017   • Tuberculin Skin Test 03/02/2020, 03/11/2020       Objective     /70 (BP Location: Left arm, Patient Position: Sitting, Cuff Size: Standard)   Pulse 71   Temp (!) 97 °F (36.1 °C)   Wt 62.1 kg (137 lb)   SpO2 98% Comment: 3 LPM  BMI 26.76 kg/m²     Physical Exam  Constitutional:       General: She is not in acute distress. Appearance: She is well-developed. She is not diaphoretic. HENT:      Head: Normocephalic and atraumatic. Nose: Nose normal.   Eyes:      Conjunctiva/sclera: Conjunctivae normal.      Pupils: Pupils are equal, round, and reactive to light. Cardiovascular:      Rate and Rhythm: Normal rate and regular rhythm. Heart sounds: Normal heart sounds. No murmur heard. Pulmonary:      Effort: Pulmonary effort is normal. No respiratory distress. Breath sounds: Normal breath sounds. No wheezing. Abdominal:      General: Bowel sounds are normal. There is no distension. Palpations: Abdomen is soft. Tenderness: There is no abdominal tenderness. Skin:     General: Skin is warm and dry. Findings: No erythema or rash. Neurological:      Mental Status: She is alert and oriented to person, place, and time.        Charlie Wu MD

## (undated) DEVICE — GLOVE SRG BIOGEL 8.5

## (undated) DEVICE — CULTURE TUBE ANAEROBIC

## (undated) DEVICE — INTENDED FOR TISSUE SEPARATION, AND OTHER PROCEDURES THAT REQUIRE A SHARP SURGICAL BLADE TO PUNCTURE OR CUT.: Brand: BARD-PARKER SAFETY BLADES SIZE 10, STERILE

## (undated) DEVICE — CULTURE TUBE AEROBIC

## (undated) DEVICE — DRAPE C-ARM X-RAY

## (undated) DEVICE — DRAPE SHEET X-LG

## (undated) DEVICE — SUT ETHILON 3-0 FS-1 18 IN 663G

## (undated) DEVICE — DISPOSABLE EQUIPMENT COVER: Brand: SMALL TOWEL DRAPE

## (undated) DEVICE — LIGHT HANDLE COVER SLEEVE DISP BLUE STELLAR

## (undated) DEVICE — SPONGE STICK WITH PVP-I: Brand: KENDALL

## (undated) DEVICE — DRAPE SHEET THREE QUARTER

## (undated) DEVICE — TOOL 14MH30 LEGEND 14CM 3MM: Brand: MIDAS REX ™

## (undated) DEVICE — DRAPE EQUIPMENT RF WAND

## (undated) DEVICE — JACKSON TABLE FOAM POSITIONING KIT: Brand: CARDINAL HEALTH

## (undated) DEVICE — DRAPE LAPAROTOMY W/POUCHES

## (undated) DEVICE — SPONGE PVP SCRUB WING STERILE

## (undated) DEVICE — CHLORAPREP HI-LITE 26ML ORANGE

## (undated) DEVICE — DRAPE C-ARMOUR

## (undated) DEVICE — SWABSTCK, BENZOIN TINCTURE, 1/PK, STRL: Brand: APLICARE

## (undated) DEVICE — FLOSEAL MATRIX IS INDICATED IN SURGICAL PROCEDURES (OTHER THAN IN OPHTHALMIC) AS AN ADJUNCT TO HEMOSTASIS WHEN CONTROL OF BLEEDING BY LIGATURE OR CONVENTIONALPROCEDURES IS INEFFECTIVE OR IMPRACTICAL.: Brand: FLOSEAL HEMOSTATIC MATRIX

## (undated) DEVICE — GLOVE INDICATOR PI UNDERGLOVE SZ 8.5 BLUE

## (undated) DEVICE — DRESSING MEPILEX AG BORDER 4 X 10 IN

## (undated) DEVICE — DRESSING MEPILEX BORDER 4 X 8 IN

## (undated) DEVICE — SUT PDS II 0 CT-1 27 IN Z340H

## (undated) DEVICE — LIGHT HANDLE COVER CAMERA DISP

## (undated) DEVICE — BIPOLAR SEALER 23-113-1 AQM 2.3: Brand: AQUAMANTYS™

## (undated) DEVICE — SPECIMEN CONTAINER STERILE PEEL PACK

## (undated) DEVICE — 3M™ TEGADERM™ TRANSPARENT FILM DRESSING FRAME STYLE, 1626W, 4 IN X 4-3/4 IN (10 CM X 12 CM), 50/CT 4CT/CASE: Brand: 3M™ TEGADERM™

## (undated) DEVICE — JP 3-SPRING RES W/10FR PVC DRAIN/TR: Brand: CARDINAL HEALTH

## (undated) DEVICE — UNIVERSAL SPINE,KIT: Brand: CARDINAL HEALTH

## (undated) DEVICE — SUT PDS II 0 CTX 60 IN Z990G

## (undated) DEVICE — POLYAXIAL SCREW
Type: IMPLANTABLE DEVICE | Site: SPINE THORACIC | Status: NON-FUNCTIONAL
Brand: XIA 4 5

## (undated) DEVICE — PROXIMATE PLUS MD MULTI-DIRECTIONAL RELEASE SKIN STAPLERS CONTAINS 35 STAINLESS STEEL STAPLES APPROXIMATE CLOSED DIMENSIONS: 6.9MM X 3.9MM WIDE: Brand: PROXIMATE

## (undated) DEVICE — SUT VICRYL 2-0 CT-1 27 IN J259H

## (undated) DEVICE — INTENDED FOR TISSUE SEPARATION, AND OTHER PROCEDURES THAT REQUIRE A SHARP SURGICAL BLADE TO PUNCTURE OR CUT.: Brand: BARD-PARKER ® CARBON RIB-BACK BLADES

## (undated) DEVICE — DRESSING MEPILEX AG BORDER 4 X 12 IN